# Patient Record
Sex: MALE | Race: WHITE | URBAN - NONMETROPOLITAN AREA
[De-identification: names, ages, dates, MRNs, and addresses within clinical notes are randomized per-mention and may not be internally consistent; named-entity substitution may affect disease eponyms.]

---

## 2017-07-09 ENCOUNTER — HOSPITAL ENCOUNTER (EMERGENCY)
Age: 22
Discharge: HOME | End: 2017-07-09
Payer: MEDICARE

## 2017-07-09 VITALS
TEMPERATURE: 98.5 F | HEART RATE: 97 BPM | SYSTOLIC BLOOD PRESSURE: 131 MMHG | HEIGHT: 62 IN | OXYGEN SATURATION: 96 % | DIASTOLIC BLOOD PRESSURE: 90 MMHG | RESPIRATION RATE: 18 BRPM | BODY MASS INDEX: 40.67 KG/M2 | WEIGHT: 221 LBS

## 2017-07-09 DIAGNOSIS — L30.9 DERMATITIS: Primary | ICD-10-CM

## 2017-07-09 PROCEDURE — 99282 EMERGENCY DEPT VISIT SF MDM: CPT

## 2017-07-09 PROCEDURE — 6370000000 HC RX 637 (ALT 250 FOR IP): Performed by: PHYSICIAN ASSISTANT

## 2017-07-09 RX ORDER — PREDNISONE 50 MG/1
50 TABLET ORAL DAILY
Qty: 3 TABLET | Refills: 0 | Status: SHIPPED | OUTPATIENT
Start: 2017-07-09

## 2017-07-09 RX ORDER — PREDNISONE 20 MG/1
60 TABLET ORAL ONCE
Status: COMPLETED | OUTPATIENT
Start: 2017-07-09 | End: 2017-07-09

## 2017-07-09 RX ORDER — DIPHENHYDRAMINE HCL 25 MG
25 TABLET ORAL ONCE
Status: COMPLETED | OUTPATIENT
Start: 2017-07-09 | End: 2017-07-09

## 2017-07-09 RX ADMIN — PREDNISONE 60 MG: 20 TABLET ORAL at 19:56

## 2017-07-09 RX ADMIN — DIPHENHYDRAMINE HYDROCHLORIDE 25 MG: 25 CAPSULE ORAL at 19:56

## 2017-07-09 ASSESSMENT — ENCOUNTER SYMPTOMS
DIARRHEA: 0
NAUSEA: 0
COUGH: 0
EYE DISCHARGE: 0
BACK PAIN: 0
SINUS PRESSURE: 0
EYE PAIN: 0
WHEEZING: 0
ABDOMINAL PAIN: 0
RHINORRHEA: 0
VOMITING: 0
TROUBLE SWALLOWING: 0

## 2017-07-09 ASSESSMENT — PAIN SCALES - GENERAL: PAINLEVEL_OUTOF10: 0

## 2019-04-25 ENCOUNTER — HOSPITAL ENCOUNTER (OUTPATIENT)
Age: 24
Discharge: HOME | End: 2019-04-25
Payer: MEDICARE

## 2019-04-25 LAB
ABSOLUTE EOS #: 0.17 K/UL (ref 0–0.44)
ABSOLUTE IMMATURE GRANULOCYTE: 0.12 K/UL (ref 0–0.3)
ABSOLUTE LYMPH #: 3.92 K/UL (ref 1.1–3.7)
ABSOLUTE MONO #: 0.92 K/UL (ref 0.1–1.2)
ALBUMIN SERPL-MCNC: 4 G/DL (ref 3.5–5.2)
ALBUMIN/GLOBULIN RATIO: 1 (ref 1–2.5)
ALP BLD-CCNC: 101 U/L (ref 40–129)
ALT SERPL-CCNC: 117 U/L (ref 5–41)
ANION GAP SERPL CALCULATED.3IONS-SCNC: 14 MMOL/L (ref 9–17)
AST SERPL-CCNC: 42 U/L
BASOPHILS # BLD: 2 % (ref 0–2)
BASOPHILS ABSOLUTE: 0.13 K/UL (ref 0–0.2)
BILIRUB SERPL-MCNC: 0.48 MG/DL (ref 0.3–1.2)
BUN BLDV-MCNC: 15 MG/DL (ref 6–20)
BUN/CREAT BLD: 14 (ref 9–20)
CALCIUM SERPL-MCNC: 9.5 MG/DL (ref 8.6–10.4)
CHLORIDE BLD-SCNC: 99 MMOL/L (ref 98–107)
CHOLESTEROL/HDL RATIO: 5.3
CHOLESTEROL: 239 MG/DL
CO2: 28 MMOL/L (ref 20–31)
CREAT SERPL-MCNC: 1.05 MG/DL (ref 0.7–1.2)
DIFFERENTIAL TYPE: ABNORMAL
EOSINOPHILS RELATIVE PERCENT: 2 % (ref 1–4)
GFR AFRICAN AMERICAN: >60 ML/MIN
GFR NON-AFRICAN AMERICAN: >60 ML/MIN
GFR SERPL CREATININE-BSD FRML MDRD: ABNORMAL ML/MIN/{1.73_M2}
GFR SERPL CREATININE-BSD FRML MDRD: ABNORMAL ML/MIN/{1.73_M2}
GLUCOSE BLD-MCNC: 105 MG/DL (ref 70–99)
HCT VFR BLD CALC: 51.9 % (ref 40.7–50.3)
HDLC SERPL-MCNC: 45 MG/DL
HEMOGLOBIN: 16.8 G/DL (ref 13–17)
IMMATURE GRANULOCYTES: 1 %
LDL CHOLESTEROL: 159 MG/DL (ref 0–130)
LYMPHOCYTES # BLD: 45 % (ref 24–43)
MCH RBC QN AUTO: 30.2 PG (ref 25.2–33.5)
MCHC RBC AUTO-ENTMCNC: 32.4 G/DL (ref 28.4–34.8)
MCV RBC AUTO: 93.3 FL (ref 82.6–102.9)
MONOCYTES # BLD: 10 % (ref 3–12)
NRBC AUTOMATED: 0 PER 100 WBC
PDW BLD-RTO: 15.3 % (ref 11.8–14.4)
PLATELET # BLD: 328 K/UL (ref 138–453)
PLATELET ESTIMATE: ABNORMAL
PMV BLD AUTO: 9 FL (ref 8.1–13.5)
POTASSIUM SERPL-SCNC: 3.7 MMOL/L (ref 3.7–5.3)
RBC # BLD: 5.56 M/UL (ref 4.21–5.77)
RBC # BLD: ABNORMAL 10*6/UL
SEG NEUTROPHILS: 40 % (ref 36–65)
SEGMENTED NEUTROPHILS ABSOLUTE COUNT: 3.56 K/UL (ref 1.5–8.1)
SODIUM BLD-SCNC: 141 MMOL/L (ref 135–144)
TOTAL PROTEIN: 8.2 G/DL (ref 6.4–8.3)
TRIGL SERPL-MCNC: 174 MG/DL
VLDLC SERPL CALC-MCNC: ABNORMAL MG/DL (ref 1–30)
WBC # BLD: 8.8 K/UL (ref 3.5–11.3)
WBC # BLD: ABNORMAL 10*3/UL

## 2019-04-25 PROCEDURE — 80061 LIPID PANEL: CPT

## 2019-04-25 PROCEDURE — 36415 COLL VENOUS BLD VENIPUNCTURE: CPT

## 2019-04-25 PROCEDURE — 80053 COMPREHEN METABOLIC PANEL: CPT

## 2019-04-25 PROCEDURE — 85025 COMPLETE CBC W/AUTO DIFF WBC: CPT

## 2021-11-15 ENCOUNTER — HOSPITAL ENCOUNTER (OUTPATIENT)
Age: 26
Setting detail: SPECIMEN
Discharge: HOME | End: 2021-11-15

## 2021-11-16 LAB
-: NORMAL
AMORPHOUS: NORMAL
BACTERIA: NORMAL
BILIRUBIN URINE: NEGATIVE
CASTS UA: NORMAL /LPF (ref 0–8)
COLOR: YELLOW
COMMENT UA: ABNORMAL
CRYSTALS, UA: NORMAL /HPF
EPITHELIAL CELLS UA: NORMAL /HPF (ref 0–5)
GLUCOSE URINE: NEGATIVE
KETONES, URINE: NEGATIVE
LEUKOCYTE ESTERASE, URINE: NEGATIVE
MUCUS: NORMAL
NITRITE, URINE: NEGATIVE
OTHER OBSERVATIONS UA: NORMAL
PH UA: 6.5 (ref 5–8)
PROTEIN UA: NEGATIVE
RBC UA: NORMAL /HPF (ref 0–4)
RENAL EPITHELIAL, UA: NORMAL /HPF
SPECIFIC GRAVITY UA: 1.02 (ref 1–1.03)
TRICHOMONAS: NORMAL
TURBIDITY: CLEAR
URINE HGB: ABNORMAL
UROBILINOGEN, URINE: NORMAL
WBC UA: NORMAL /HPF (ref 0–5)
YEAST: NORMAL

## 2022-03-22 ENCOUNTER — HOSPITAL ENCOUNTER (OUTPATIENT)
Age: 27
Setting detail: SPECIMEN
Discharge: HOME | End: 2022-03-22

## 2022-03-22 LAB
ABSOLUTE EOS #: 0.08 K/UL (ref 0–0.44)
ABSOLUTE IMMATURE GRANULOCYTE: 0.06 K/UL (ref 0–0.3)
ABSOLUTE LYMPH #: 3.16 K/UL (ref 1.1–3.7)
ABSOLUTE MONO #: 0.73 K/UL (ref 0.1–1.2)
BASOPHILS # BLD: 2 % (ref 0–2)
BASOPHILS ABSOLUTE: 0.15 K/UL (ref 0–0.2)
CHOLESTEROL, FASTING: 227 MG/DL
CHOLESTEROL/HDL RATIO: 5.3
EOSINOPHILS RELATIVE PERCENT: 1 % (ref 1–4)
FOLATE: >20 NG/ML
HCT VFR BLD CALC: 53.2 % (ref 40.7–50.3)
HDLC SERPL-MCNC: 43 MG/DL
HEMOGLOBIN: 17.3 G/DL (ref 13–17)
IMMATURE GRANULOCYTES: 1 %
LDL CHOLESTEROL: 159 MG/DL (ref 0–130)
LYMPHOCYTES # BLD: 47 % (ref 24–43)
MCH RBC QN AUTO: 30.8 PG (ref 25.2–33.5)
MCHC RBC AUTO-ENTMCNC: 32.5 G/DL (ref 28.4–34.8)
MCV RBC AUTO: 94.8 FL (ref 82.6–102.9)
MONOCYTES # BLD: 11 % (ref 3–12)
NRBC AUTOMATED: 0 PER 100 WBC
PDW BLD-RTO: 14.7 % (ref 11.8–14.4)
PLATELET # BLD: 409 K/UL (ref 138–453)
PMV BLD AUTO: 9.1 FL (ref 8.1–13.5)
RBC # BLD: 5.61 M/UL (ref 4.21–5.77)
RBC # BLD: ABNORMAL 10*6/UL
SEG NEUTROPHILS: 38 % (ref 36–65)
SEGMENTED NEUTROPHILS ABSOLUTE COUNT: 2.53 K/UL (ref 1.5–8.1)
TRIGLYCERIDE, FASTING: 126 MG/DL
TSH SERPL DL<=0.05 MIU/L-ACNC: 2.45 MIU/L (ref 0.3–5)
VITAMIN B-12: 677 PG/ML (ref 232–1245)
VITAMIN D 25-HYDROXY: 29.3 NG/ML
WBC # BLD: 6.7 K/UL (ref 3.5–11.3)

## 2022-03-29 LAB
ALBUMIN SERPL-MCNC: 4.2 G/DL (ref 3.5–5.2)
ALBUMIN/GLOBULIN RATIO: 1.3 (ref 1–2.5)
ALP BLD-CCNC: 95 U/L (ref 40–129)
ALT SERPL-CCNC: 37 U/L (ref 5–41)
ANION GAP SERPL CALCULATED.3IONS-SCNC: 19 MMOL/L (ref 9–17)
AST SERPL-CCNC: 25 U/L
BILIRUB SERPL-MCNC: 0.7 MG/DL (ref 0.3–1.2)
BUN BLDV-MCNC: 12 MG/DL (ref 6–20)
CALCIUM SERPL-MCNC: 9.4 MG/DL (ref 8.6–10.4)
CHLORIDE BLD-SCNC: 101 MMOL/L (ref 98–107)
CO2: 19 MMOL/L (ref 20–31)
CREAT SERPL-MCNC: 0.94 MG/DL (ref 0.7–1.2)
GFR AFRICAN AMERICAN: >60 ML/MIN
GFR NON-AFRICAN AMERICAN: >60 ML/MIN
GFR SERPL CREATININE-BSD FRML MDRD: ABNORMAL ML/MIN/{1.73_M2}
GLUCOSE BLD-MCNC: 86 MG/DL (ref 70–99)
POTASSIUM SERPL-SCNC: 4.3 MMOL/L (ref 3.7–5.3)
SODIUM BLD-SCNC: 139 MMOL/L (ref 135–144)
TOTAL PROTEIN: 7.5 G/DL (ref 6.4–8.3)

## 2023-01-01 ENCOUNTER — APPOINTMENT (OUTPATIENT)
Dept: CT IMAGING | Age: 28
DRG: 871 | End: 2023-01-01
Payer: MEDICARE

## 2023-01-01 ENCOUNTER — APPOINTMENT (OUTPATIENT)
Dept: GENERAL RADIOLOGY | Age: 28
DRG: 871 | End: 2023-01-01
Payer: MEDICARE

## 2023-01-01 ENCOUNTER — ANESTHESIA (OUTPATIENT)
Dept: ICU | Age: 28
End: 2023-01-01

## 2023-01-01 ENCOUNTER — ANESTHESIA EVENT (OUTPATIENT)
Dept: ICU | Age: 28
End: 2023-01-01

## 2023-01-01 ENCOUNTER — HOSPITAL ENCOUNTER (INPATIENT)
Age: 28
LOS: 5 days | DRG: 871 | End: 2023-02-16
Attending: EMERGENCY MEDICINE | Admitting: INTERNAL MEDICINE
Payer: MEDICARE

## 2023-01-01 ENCOUNTER — ANESTHESIA (OUTPATIENT)
Dept: OPERATING ROOM | Age: 28
DRG: 871 | End: 2023-01-01
Payer: MEDICARE

## 2023-01-01 ENCOUNTER — ANESTHESIA EVENT (OUTPATIENT)
Dept: OPERATING ROOM | Age: 28
DRG: 871 | End: 2023-01-01
Payer: MEDICARE

## 2023-01-01 VITALS
BODY MASS INDEX: 34.6 KG/M2 | HEIGHT: 67 IN | HEART RATE: 96 BPM | OXYGEN SATURATION: 99 % | TEMPERATURE: 97.5 F | DIASTOLIC BLOOD PRESSURE: 77 MMHG | RESPIRATION RATE: 14 BRPM | WEIGHT: 220.46 LBS | SYSTOLIC BLOOD PRESSURE: 119 MMHG

## 2023-01-01 DIAGNOSIS — I46.9 CARDIAC ARREST (HCC): ICD-10-CM

## 2023-01-01 DIAGNOSIS — T78.2XXA ANAPHYLACTIC SHOCK: Primary | ICD-10-CM

## 2023-01-01 LAB
ABSOLUTE EOS #: 0 K/UL (ref 0–0.4)
ABSOLUTE EOS #: 0.39 K/UL (ref 0–0.4)
ABSOLUTE IMMATURE GRANULOCYTE: 0.43 K/UL (ref 0–0.3)
ABSOLUTE IMMATURE GRANULOCYTE: 1.06 K/UL (ref 0–0.3)
ABSOLUTE IMMATURE GRANULOCYTE: 1.27 K/UL (ref 0–0.3)
ABSOLUTE IMMATURE GRANULOCYTE: 1.88 K/UL (ref 0–0.3)
ABSOLUTE IMMATURE GRANULOCYTE: 2.82 K/UL (ref 0–0.3)
ABSOLUTE IMMATURE GRANULOCYTE: 3.94 K/UL (ref 0–0.3)
ABSOLUTE LYMPH #: 0.56 K/UL (ref 1–4.8)
ABSOLUTE LYMPH #: 1.06 K/UL (ref 1–4.8)
ABSOLUTE LYMPH #: 1.5 K/UL (ref 1–4.8)
ABSOLUTE LYMPH #: 2.13 K/UL (ref 1–4.8)
ABSOLUTE LYMPH #: 2.23 K/UL (ref 1–4.8)
ABSOLUTE LYMPH #: 4.73 K/UL (ref 1–4.8)
ABSOLUTE MONO #: 0.38 K/UL (ref 0.1–0.8)
ABSOLUTE MONO #: 0.39 K/UL (ref 0.1–0.8)
ABSOLUTE MONO #: 0.56 K/UL (ref 0.1–0.8)
ABSOLUTE MONO #: 1.27 K/UL (ref 0.1–0.8)
ABSOLUTE MONO #: 1.28 K/UL (ref 0.1–0.8)
ABSOLUTE MONO #: 2.12 K/UL (ref 0.1–0.8)
ADENOVIRUS PCR: NOT DETECTED
ALBUMIN SERPL-MCNC: 2.1 G/DL (ref 3.5–5.2)
ALBUMIN SERPL-MCNC: 2.1 G/DL (ref 3.5–5.2)
ALBUMIN SERPL-MCNC: 2.2 G/DL (ref 3.5–5.2)
ALBUMIN SERPL-MCNC: 2.3 G/DL (ref 3.5–5.2)
ALBUMIN SERPL-MCNC: 2.4 G/DL (ref 3.5–5.2)
ALBUMIN SERPL-MCNC: 2.5 G/DL (ref 3.5–5.2)
ALBUMIN SERPL-MCNC: 2.6 G/DL (ref 3.5–5.2)
ALBUMIN SERPL-MCNC: 2.6 G/DL (ref 3.5–5.2)
ALBUMIN SERPL-MCNC: 2.7 G/DL (ref 3.5–5.2)
ALBUMIN SERPL-MCNC: 2.8 G/DL (ref 3.5–5.2)
ALBUMIN/GLOBULIN RATIO: 0.8 (ref 1–2.5)
ALBUMIN/GLOBULIN RATIO: 0.9 (ref 1–2.5)
ALBUMIN/GLOBULIN RATIO: 0.9 (ref 1–2.5)
ALBUMIN/GLOBULIN RATIO: 1 (ref 1–2.5)
ALBUMIN/GLOBULIN RATIO: 1.2 (ref 1–2.5)
ALBUMIN/GLOBULIN RATIO: 1.2 (ref 1–2.5)
ALBUMIN/GLOBULIN RATIO: 1.3 (ref 1–2.5)
ALLEN TEST: ABNORMAL
ALLEN TEST: NORMAL
ALLEN TEST: POSITIVE
ALP SERPL-CCNC: 187 U/L (ref 40–129)
ALP SERPL-CCNC: 203 U/L (ref 40–129)
ALP SERPL-CCNC: 218 U/L (ref 40–129)
ALP SERPL-CCNC: 224 U/L (ref 40–129)
ALP SERPL-CCNC: 243 U/L (ref 40–129)
ALP SERPL-CCNC: 260 U/L (ref 40–129)
ALP SERPL-CCNC: 261 U/L (ref 40–129)
ALP SERPL-CCNC: 287 U/L (ref 40–129)
ALP SERPL-CCNC: 293 U/L (ref 40–129)
ALP SERPL-CCNC: 327 U/L (ref 40–129)
ALP SERPL-CCNC: 348 U/L (ref 40–129)
ALP SERPL-CCNC: 428 U/L (ref 40–129)
ALP SERPL-CCNC: 431 U/L (ref 40–129)
ALP SERPL-CCNC: 441 U/L (ref 40–129)
ALT SERPL-CCNC: 1452 U/L (ref 5–41)
ALT SERPL-CCNC: 1667 U/L (ref 5–41)
ALT SERPL-CCNC: 1712 U/L (ref 5–41)
ALT SERPL-CCNC: 2364 U/L (ref 5–41)
ALT SERPL-CCNC: 2392 U/L (ref 5–41)
ALT SERPL-CCNC: 2796 U/L (ref 5–41)
ALT SERPL-CCNC: 3335 U/L (ref 5–41)
ALT SERPL-CCNC: 3965 U/L (ref 5–41)
ALT SERPL-CCNC: 4371 U/L (ref 5–41)
ALT SERPL-CCNC: 5043 U/L (ref 5–41)
ALT SERPL-CCNC: 5371 U/L (ref 5–41)
ALT SERPL-CCNC: 5585 U/L (ref 5–41)
ALT SERPL-CCNC: 6917 U/L (ref 5–41)
ALT SERPL-CCNC: >7000 U/L (ref 5–41)
AMMONIA PLAS-SCNC: 51 UMOL/L (ref 16–60)
AMYLASE SERPL-CCNC: 46 U/L (ref 28–100)
ANION GAP SERPL CALCULATED.3IONS-SCNC: 14 MMOL/L (ref 9–17)
ANION GAP SERPL CALCULATED.3IONS-SCNC: 15 MMOL/L (ref 9–17)
ANION GAP SERPL CALCULATED.3IONS-SCNC: 17 MMOL/L (ref 9–17)
ANION GAP SERPL CALCULATED.3IONS-SCNC: 18 MMOL/L (ref 9–17)
ANION GAP SERPL CALCULATED.3IONS-SCNC: 19 MMOL/L (ref 9–17)
ANION GAP SERPL CALCULATED.3IONS-SCNC: 19 MMOL/L (ref 9–17)
ANION GAP SERPL CALCULATED.3IONS-SCNC: 20 MMOL/L (ref 9–17)
ANION GAP SERPL CALCULATED.3IONS-SCNC: 21 MMOL/L (ref 9–17)
ANION GAP SERPL CALCULATED.3IONS-SCNC: 22 MMOL/L (ref 9–17)
ANION GAP SERPL CALCULATED.3IONS-SCNC: 22 MMOL/L (ref 9–17)
AST SERPL-CCNC: 1009 U/L
AST SERPL-CCNC: 1433 U/L
AST SERPL-CCNC: 164 U/L
AST SERPL-CCNC: 203 U/L
AST SERPL-CCNC: 2060 U/L
AST SERPL-CCNC: 2578 U/L
AST SERPL-CCNC: 296 U/L
AST SERPL-CCNC: 3114 U/L
AST SERPL-CCNC: 319 U/L
AST SERPL-CCNC: 443 U/L
AST SERPL-CCNC: 4896 U/L
AST SERPL-CCNC: 631 U/L
AST SERPL-CCNC: >7000 U/L
AST SERPL-CCNC: >7000 U/L
B PARAP IS1001 DNA NPH QL NAA+NON-PROBE: NOT DETECTED
B PERT DNA SPEC QL NAA+PROBE: NOT DETECTED
BASOPHILS # BLD: 0 % (ref 0–2)
BASOPHILS ABSOLUTE: 0 K/UL (ref 0–0.2)
BILIRUB DIRECT SERPL-MCNC: 2.3 MG/DL
BILIRUB DIRECT SERPL-MCNC: 2.4 MG/DL
BILIRUB DIRECT SERPL-MCNC: 2.6 MG/DL
BILIRUB DIRECT SERPL-MCNC: 2.7 MG/DL
BILIRUB DIRECT SERPL-MCNC: 2.7 MG/DL
BILIRUB DIRECT SERPL-MCNC: 2.8 MG/DL
BILIRUB DIRECT SERPL-MCNC: 2.9 MG/DL
BILIRUB DIRECT SERPL-MCNC: 3 MG/DL
BILIRUB INDIRECT SERPL-MCNC: 0.4 MG/DL (ref 0–1)
BILIRUB SERPL-MCNC: 2 MG/DL (ref 0.3–1.2)
BILIRUB SERPL-MCNC: 2.7 MG/DL (ref 0.3–1.2)
BILIRUB SERPL-MCNC: 2.9 MG/DL (ref 0.3–1.2)
BILIRUB SERPL-MCNC: 3 MG/DL (ref 0.3–1.2)
BILIRUB SERPL-MCNC: 3.1 MG/DL (ref 0.3–1.2)
BILIRUB SERPL-MCNC: 3.2 MG/DL (ref 0.3–1.2)
BILIRUB SERPL-MCNC: 3.4 MG/DL (ref 0.3–1.2)
BILIRUB SERPL-MCNC: 3.5 MG/DL (ref 0.3–1.2)
BILIRUB SERPL-MCNC: 3.6 MG/DL (ref 0.3–1.2)
BILIRUB SERPL-MCNC: 3.7 MG/DL (ref 0.3–1.2)
BILIRUBIN URINE: ABNORMAL
BILIRUBIN URINE: NEGATIVE
BNP SERPL-MCNC: 921 PG/ML
BUN SERPL-MCNC: 24 MG/DL (ref 6–20)
BUN SERPL-MCNC: 29 MG/DL (ref 6–20)
BUN SERPL-MCNC: 39 MG/DL (ref 6–20)
BUN SERPL-MCNC: 45 MG/DL (ref 6–20)
BUN SERPL-MCNC: 56 MG/DL (ref 6–20)
BUN SERPL-MCNC: 56 MG/DL (ref 6–20)
BUN SERPL-MCNC: 68 MG/DL (ref 6–20)
BUN SERPL-MCNC: 72 MG/DL (ref 6–20)
BUN SERPL-MCNC: 74 MG/DL (ref 6–20)
BUN SERPL-MCNC: 76 MG/DL (ref 6–20)
BUN SERPL-MCNC: 81 MG/DL (ref 6–20)
BUN SERPL-MCNC: 81 MG/DL (ref 6–20)
BUN SERPL-MCNC: 85 MG/DL (ref 6–20)
BUN SERPL-MCNC: 87 MG/DL (ref 6–20)
BUN SERPL-MCNC: 90 MG/DL (ref 6–20)
BUN SERPL-MCNC: 91 MG/DL (ref 6–20)
CA-I BLD-SCNC: 0.77 MMOL/L (ref 1.13–1.33)
CA-I BLD-SCNC: 0.84 MMOL/L (ref 1.13–1.33)
CA-I BLD-SCNC: 1 MMOL/L (ref 1.13–1.33)
CALCIUM SERPL-MCNC: 5.6 MG/DL (ref 8.6–10.4)
CALCIUM SERPL-MCNC: 5.6 MG/DL (ref 8.6–10.4)
CALCIUM SERPL-MCNC: 6 MG/DL (ref 8.6–10.4)
CALCIUM SERPL-MCNC: 6.1 MG/DL (ref 8.6–10.4)
CALCIUM SERPL-MCNC: 6.2 MG/DL (ref 8.6–10.4)
CALCIUM SERPL-MCNC: 6.4 MG/DL (ref 8.6–10.4)
CALCIUM SERPL-MCNC: 6.5 MG/DL (ref 8.6–10.4)
CALCIUM SERPL-MCNC: 6.9 MG/DL (ref 8.6–10.4)
CALCIUM SERPL-MCNC: 6.9 MG/DL (ref 8.6–10.4)
CALCIUM SERPL-MCNC: 7 MG/DL (ref 8.6–10.4)
CALCIUM SERPL-MCNC: 7 MG/DL (ref 8.6–10.4)
CALCIUM SERPL-MCNC: 7.3 MG/DL (ref 8.6–10.4)
CARBOXYHEMOGLOBIN: 1.8 % (ref 0–5)
CASTS UA: ABNORMAL /LPF (ref 0–8)
CHLAMYDIA PNEUMONIAE BY PCR: NOT DETECTED
CHLORIDE SERPL-SCNC: 100 MMOL/L (ref 98–107)
CHLORIDE SERPL-SCNC: 101 MMOL/L (ref 98–107)
CHLORIDE SERPL-SCNC: 101 MMOL/L (ref 98–107)
CHLORIDE SERPL-SCNC: 102 MMOL/L (ref 98–107)
CHLORIDE SERPL-SCNC: 102 MMOL/L (ref 98–107)
CHLORIDE SERPL-SCNC: 95 MMOL/L (ref 98–107)
CHLORIDE SERPL-SCNC: 96 MMOL/L (ref 98–107)
CHLORIDE SERPL-SCNC: 97 MMOL/L (ref 98–107)
CHLORIDE SERPL-SCNC: 98 MMOL/L (ref 98–107)
CHLORIDE SERPL-SCNC: 99 MMOL/L (ref 98–107)
CK SERPL-CCNC: ABNORMAL U/L (ref 39–308)
CO2 SERPL-SCNC: 16 MMOL/L (ref 20–31)
CO2 SERPL-SCNC: 17 MMOL/L (ref 20–31)
CO2 SERPL-SCNC: 18 MMOL/L (ref 20–31)
CO2 SERPL-SCNC: 19 MMOL/L (ref 20–31)
CO2 SERPL-SCNC: 23 MMOL/L (ref 20–31)
CO2 SERPL-SCNC: 23 MMOL/L (ref 20–31)
CO2 SERPL-SCNC: 26 MMOL/L (ref 20–31)
CO2 SERPL-SCNC: 27 MMOL/L (ref 20–31)
CO2 SERPL-SCNC: 28 MMOL/L (ref 20–31)
CO2 SERPL-SCNC: 28 MMOL/L (ref 20–31)
CO2 SERPL-SCNC: 29 MMOL/L (ref 20–31)
CO2 SERPL-SCNC: 29 MMOL/L (ref 20–31)
CO2 SERPL-SCNC: 30 MMOL/L (ref 20–31)
COLOR: ABNORMAL
COLOR: YELLOW
CORONAVIRUS 229E PCR: NOT DETECTED
CORONAVIRUS HKU1 PCR: NOT DETECTED
CORONAVIRUS NL63 PCR: NOT DETECTED
CORONAVIRUS OC43 PCR: NOT DETECTED
CREAT SERPL-MCNC: 1.92 MG/DL (ref 0.7–1.2)
CREAT SERPL-MCNC: 2.03 MG/DL (ref 0.7–1.2)
CREAT SERPL-MCNC: 2.64 MG/DL (ref 0.7–1.2)
CREAT SERPL-MCNC: 2.8 MG/DL (ref 0.7–1.2)
CREAT SERPL-MCNC: 3.42 MG/DL (ref 0.7–1.2)
CREAT SERPL-MCNC: 4.15 MG/DL (ref 0.7–1.2)
CREAT SERPL-MCNC: 4.36 MG/DL (ref 0.7–1.2)
CREAT SERPL-MCNC: 4.79 MG/DL (ref 0.7–1.2)
CREAT SERPL-MCNC: 4.89 MG/DL (ref 0.7–1.2)
CREAT SERPL-MCNC: 5.22 MG/DL (ref 0.7–1.2)
CREAT SERPL-MCNC: 5.26 MG/DL (ref 0.7–1.2)
CREAT SERPL-MCNC: 5.31 MG/DL (ref 0.7–1.2)
CREAT SERPL-MCNC: 5.4 MG/DL (ref 0.7–1.2)
CREAT SERPL-MCNC: 5.96 MG/DL (ref 0.7–1.2)
CREAT SERPL-MCNC: 6.01 MG/DL (ref 0.7–1.2)
CREAT SERPL-MCNC: 6.15 MG/DL (ref 0.7–1.2)
EGFR, POC: 42 ML/MIN/1.73M2
EKG ATRIAL RATE: 119 BPM
EKG ATRIAL RATE: 97 BPM
EKG P AXIS: 20 DEGREES
EKG P AXIS: 60 DEGREES
EKG P-R INTERVAL: 156 MS
EKG P-R INTERVAL: 158 MS
EKG Q-T INTERVAL: 320 MS
EKG Q-T INTERVAL: 368 MS
EKG QRS DURATION: 78 MS
EKG QRS DURATION: 82 MS
EKG QTC CALCULATION (BAZETT): 450 MS
EKG QTC CALCULATION (BAZETT): 467 MS
EKG R AXIS: 14 DEGREES
EKG R AXIS: 67 DEGREES
EKG T AXIS: 11 DEGREES
EKG T AXIS: 39 DEGREES
EKG VENTRICULAR RATE: 119 BPM
EKG VENTRICULAR RATE: 97 BPM
EOSINOPHILS RELATIVE PERCENT: 0 % (ref 1–4)
EOSINOPHILS RELATIVE PERCENT: 1 % (ref 1–4)
EPITHELIAL CELLS UA: ABNORMAL /HPF (ref 0–5)
EPITHELIAL CELLS UA: NORMAL /HPF (ref 0–5)
FIO2: 100
FIO2: 30
FIO2: 40
FIO2: ABNORMAL
GFR SERPL CREATININE-BSD FRML MDRD: 12 ML/MIN/1.73M2
GFR SERPL CREATININE-BSD FRML MDRD: 14 ML/MIN/1.73M2
GFR SERPL CREATININE-BSD FRML MDRD: 16 ML/MIN/1.73M2
GFR SERPL CREATININE-BSD FRML MDRD: 16 ML/MIN/1.73M2
GFR SERPL CREATININE-BSD FRML MDRD: 18 ML/MIN/1.73M2
GFR SERPL CREATININE-BSD FRML MDRD: 19 ML/MIN/1.73M2
GFR SERPL CREATININE-BSD FRML MDRD: 24 ML/MIN/1.73M2
GFR SERPL CREATININE-BSD FRML MDRD: 30 ML/MIN/1.73M2
GFR SERPL CREATININE-BSD FRML MDRD: 33 ML/MIN/1.73M2
GFR SERPL CREATININE-BSD FRML MDRD: 45 ML/MIN/1.73M2
GFR SERPL CREATININE-BSD FRML MDRD: 48 ML/MIN/1.73M2
GGT: 244 U/L (ref 8–61)
GLUCOSE BLD-MCNC: 102 MG/DL (ref 75–110)
GLUCOSE BLD-MCNC: 106 MG/DL (ref 75–110)
GLUCOSE BLD-MCNC: 110 MG/DL (ref 74–100)
GLUCOSE BLD-MCNC: 116 MG/DL (ref 74–100)
GLUCOSE BLD-MCNC: 117 MG/DL (ref 74–100)
GLUCOSE BLD-MCNC: 120 MG/DL (ref 74–100)
GLUCOSE BLD-MCNC: 121 MG/DL (ref 74–100)
GLUCOSE BLD-MCNC: 122 MG/DL (ref 74–100)
GLUCOSE BLD-MCNC: 122 MG/DL (ref 74–100)
GLUCOSE BLD-MCNC: 124 MG/DL (ref 74–100)
GLUCOSE BLD-MCNC: 125 MG/DL (ref 75–110)
GLUCOSE BLD-MCNC: 126 MG/DL (ref 74–100)
GLUCOSE BLD-MCNC: 129 MG/DL (ref 74–100)
GLUCOSE BLD-MCNC: 130 MG/DL (ref 75–110)
GLUCOSE BLD-MCNC: 132 MG/DL (ref 74–100)
GLUCOSE BLD-MCNC: 132 MG/DL (ref 75–110)
GLUCOSE BLD-MCNC: 141 MG/DL (ref 74–100)
GLUCOSE BLD-MCNC: 147 MG/DL (ref 75–110)
GLUCOSE BLD-MCNC: 159 MG/DL (ref 74–100)
GLUCOSE BLD-MCNC: 161 MG/DL (ref 74–100)
GLUCOSE BLD-MCNC: 162 MG/DL (ref 74–100)
GLUCOSE BLD-MCNC: 163 MG/DL (ref 74–100)
GLUCOSE BLD-MCNC: 165 MG/DL (ref 74–100)
GLUCOSE BLD-MCNC: 165 MG/DL (ref 75–110)
GLUCOSE BLD-MCNC: 189 MG/DL (ref 74–100)
GLUCOSE BLD-MCNC: 82 MG/DL (ref 75–110)
GLUCOSE BLD-MCNC: 98 MG/DL (ref 75–110)
GLUCOSE SERPL-MCNC: 106 MG/DL (ref 70–99)
GLUCOSE SERPL-MCNC: 107 MG/DL (ref 70–99)
GLUCOSE SERPL-MCNC: 125 MG/DL (ref 70–99)
GLUCOSE SERPL-MCNC: 132 MG/DL (ref 70–99)
GLUCOSE SERPL-MCNC: 132 MG/DL (ref 70–99)
GLUCOSE SERPL-MCNC: 140 MG/DL (ref 70–99)
GLUCOSE SERPL-MCNC: 149 MG/DL (ref 70–99)
GLUCOSE SERPL-MCNC: 150 MG/DL (ref 70–99)
GLUCOSE SERPL-MCNC: 155 MG/DL (ref 70–99)
GLUCOSE SERPL-MCNC: 179 MG/DL (ref 70–99)
GLUCOSE SERPL-MCNC: 181 MG/DL (ref 70–99)
GLUCOSE SERPL-MCNC: 190 MG/DL (ref 70–99)
GLUCOSE SERPL-MCNC: 196 MG/DL (ref 70–99)
GLUCOSE SERPL-MCNC: 202 MG/DL (ref 70–99)
GLUCOSE SERPL-MCNC: 202 MG/DL (ref 70–99)
GLUCOSE SERPL-MCNC: 208 MG/DL (ref 70–99)
GLUCOSE UR STRIP.AUTO-MCNC: ABNORMAL MG/DL
HBV CORE AB SER QL: NONREACTIVE
HBV SURFACE AB SERPL IA-ACNC: <3.5 MIU/ML
HBV SURFACE AG SER QL: NONREACTIVE
HCO3 VENOUS: 17.6 MMOL/L (ref 24–30)
HCT VFR BLD AUTO: 27.6 % (ref 40.7–50.3)
HCT VFR BLD AUTO: 33.9 % (ref 40.7–50.3)
HCT VFR BLD AUTO: 35.9 % (ref 40.7–50.3)
HCT VFR BLD AUTO: 35.9 % (ref 40.7–50.3)
HCT VFR BLD AUTO: 36 % (ref 40.7–50.3)
HCT VFR BLD AUTO: 39.6 % (ref 40.7–50.3)
HCT VFR BLD AUTO: 40 % (ref 40.7–50.3)
HCT VFR BLD AUTO: 40.9 % (ref 40.7–50.3)
HCT VFR BLD AUTO: 41.6 % (ref 40.7–50.3)
HCT VFR BLD AUTO: 42.4 % (ref 40.7–50.3)
HCV AB SER QL: NONREACTIVE
HGB BLD-MCNC: 11.9 G/DL (ref 13–17)
HGB BLD-MCNC: 12.4 G/DL (ref 13–17)
HGB BLD-MCNC: 12.5 G/DL (ref 13–17)
HGB BLD-MCNC: 12.5 G/DL (ref 13–17)
HGB BLD-MCNC: 13.9 G/DL (ref 13–17)
HGB BLD-MCNC: 14.2 G/DL (ref 13–17)
HGB BLD-MCNC: 14.3 G/DL (ref 13–17)
HGB BLD-MCNC: 9.7 G/DL (ref 13–17)
HUMAN METAPNEUMOVIRUS PCR: NOT DETECTED
IMMATURE GRANULOCYTES: 1 %
IMMATURE GRANULOCYTES: 10 %
IMMATURE GRANULOCYTES: 10 %
IMMATURE GRANULOCYTES: 3 %
IMMATURE GRANULOCYTES: 4 %
IMMATURE GRANULOCYTES: 5 %
INFLUENZA A BY PCR: NOT DETECTED
INFLUENZA B BY PCR: NOT DETECTED
INR PPP: 1.5
INR PPP: 1.6
INR PPP: 1.9
INR PPP: 2
INR PPP: 2
INR PPP: 2.1
INR PPP: 2.2
KETONES UR STRIP.AUTO-MCNC: NEGATIVE MG/DL
L PNEUMO1 AG UR QL IA.RAPID: NEGATIVE
LACTIC ACID, SEPSIS WHOLE BLOOD: 4 MMOL/L (ref 0.5–1.9)
LACTIC ACID, SEPSIS WHOLE BLOOD: 4.4 MMOL/L (ref 0.5–1.9)
LACTIC ACID, WHOLE BLOOD: 2.6 MMOL/L (ref 0.7–2.1)
LACTIC ACID, WHOLE BLOOD: 2.6 MMOL/L (ref 0.7–2.1)
LACTIC ACID, WHOLE BLOOD: 2.8 MMOL/L (ref 0.7–2.1)
LACTIC ACID, WHOLE BLOOD: 3.2 MMOL/L (ref 0.7–2.1)
LACTIC ACID, WHOLE BLOOD: 3.5 MMOL/L (ref 0.7–2.1)
LACTIC ACID, WHOLE BLOOD: 3.7 MMOL/L (ref 0.7–2.1)
LACTIC ACID, WHOLE BLOOD: 4.5 MMOL/L (ref 0.7–2.1)
LACTIC ACID, WHOLE BLOOD: 5.5 MMOL/L (ref 0.7–2.1)
LACTIC ACID, WHOLE BLOOD: 9 MMOL/L (ref 0.7–2.1)
LDLC SERPL-MCNC: 5790 U/L (ref 135–225)
LEUKOCYTE ESTERASE UR QL STRIP.AUTO: ABNORMAL
LEUKOCYTE ESTERASE UR QL STRIP.AUTO: ABNORMAL
LEUKOCYTE ESTERASE UR QL STRIP.AUTO: NEGATIVE
LEUKOCYTE ESTERASE UR QL STRIP.AUTO: NEGATIVE
LIPASE SERPL-CCNC: 77 U/L (ref 13–60)
LV EF: 60 %
LVEF MODALITY: NORMAL
LYMPHOCYTES # BLD: 12 % (ref 24–44)
LYMPHOCYTES # BLD: 2 % (ref 24–44)
LYMPHOCYTES # BLD: 3 % (ref 24–44)
LYMPHOCYTES # BLD: 4 % (ref 24–44)
LYMPHOCYTES # BLD: 5 % (ref 24–44)
LYMPHOCYTES # BLD: 7 % (ref 24–44)
M PNEUMO IGM SER QL IA: 0.22
MAGNESIUM SERPL-MCNC: 1.7 MG/DL (ref 1.6–2.6)
MAGNESIUM SERPL-MCNC: 1.8 MG/DL (ref 1.6–2.6)
MAGNESIUM SERPL-MCNC: 1.8 MG/DL (ref 1.6–2.6)
MAGNESIUM SERPL-MCNC: 2 MG/DL (ref 1.6–2.6)
MAGNESIUM SERPL-MCNC: 2.1 MG/DL (ref 1.6–2.6)
MAGNESIUM SERPL-MCNC: 2.2 MG/DL (ref 1.6–2.6)
MAGNESIUM SERPL-MCNC: 2.3 MG/DL (ref 1.6–2.6)
MAGNESIUM SERPL-MCNC: 2.3 MG/DL (ref 1.6–2.6)
MCH RBC QN AUTO: 30.4 PG (ref 25.2–33.5)
MCH RBC QN AUTO: 30.5 PG (ref 25.2–33.5)
MCH RBC QN AUTO: 30.6 PG (ref 25.2–33.5)
MCH RBC QN AUTO: 30.6 PG (ref 25.2–33.5)
MCH RBC QN AUTO: 30.7 PG (ref 25.2–33.5)
MCH RBC QN AUTO: 30.7 PG (ref 25.2–33.5)
MCH RBC QN AUTO: 30.8 PG (ref 25.2–33.5)
MCH RBC QN AUTO: 30.9 PG (ref 25.2–33.5)
MCH RBC QN AUTO: 31 PG (ref 25.2–33.5)
MCH RBC QN AUTO: 31 PG (ref 25.2–33.5)
MCHC RBC AUTO-ENTMCNC: 32.8 G/DL (ref 28.4–34.8)
MCHC RBC AUTO-ENTMCNC: 34.4 G/DL (ref 28.4–34.8)
MCHC RBC AUTO-ENTMCNC: 34.5 G/DL (ref 28.4–34.8)
MCHC RBC AUTO-ENTMCNC: 34.7 G/DL (ref 28.4–34.8)
MCHC RBC AUTO-ENTMCNC: 34.7 G/DL (ref 28.4–34.8)
MCHC RBC AUTO-ENTMCNC: 34.8 G/DL (ref 28.4–34.8)
MCHC RBC AUTO-ENTMCNC: 34.8 G/DL (ref 28.4–34.8)
MCHC RBC AUTO-ENTMCNC: 35.1 G/DL (ref 28.4–34.8)
MCV RBC AUTO: 87.6 FL (ref 82.6–102.9)
MCV RBC AUTO: 87.9 FL (ref 82.6–102.9)
MCV RBC AUTO: 87.9 FL (ref 82.6–102.9)
MCV RBC AUTO: 88 FL (ref 82.6–102.9)
MCV RBC AUTO: 88.4 FL (ref 82.6–102.9)
MCV RBC AUTO: 88.5 FL (ref 82.6–102.9)
MCV RBC AUTO: 89.7 FL (ref 82.6–102.9)
MCV RBC AUTO: 94.6 FL (ref 82.6–102.9)
MICROORGANISM SPEC CULT: ABNORMAL
MICROORGANISM SPEC CULT: NO GROWTH
MICROORGANISM SPEC CULT: NO GROWTH
MICROORGANISM/AGENT SPEC: ABNORMAL
MODE: ABNORMAL
MODE: NORMAL
MONOCYTES # BLD: 1 % (ref 1–7)
MONOCYTES # BLD: 1 % (ref 1–7)
MONOCYTES # BLD: 2 % (ref 1–7)
MONOCYTES # BLD: 3 % (ref 1–7)
MONOCYTES # BLD: 4 % (ref 1–7)
MONOCYTES # BLD: 6 % (ref 1–7)
MORPHOLOGY: ABNORMAL
MRSA, DNA, NASAL: NEGATIVE
MYCOPLASMA PNEUMONIAE PCR: NOT DETECTED
NEGATIVE BASE EXCESS, ART: 11 (ref 0–2)
NEGATIVE BASE EXCESS, ART: 2 (ref 0–2)
NEGATIVE BASE EXCESS, ART: 4 (ref 0–2)
NEGATIVE BASE EXCESS, ART: 8 (ref 0–2)
NEGATIVE BASE EXCESS, VEN: 12.5 MMOL/L (ref 0–2)
NITRITE UR QL STRIP.AUTO: NEGATIVE
NRBC AUTOMATED: 0 PER 100 WBC
NRBC AUTOMATED: 0.1 PER 100 WBC
NRBC AUTOMATED: 0.4 PER 100 WBC
NRBC AUTOMATED: 0.8 PER 100 WBC
NRBC AUTOMATED: 0.9 PER 100 WBC
NRBC AUTOMATED: 0.9 PER 100 WBC
NUCLEATED RED BLOOD CELLS: 1 PER 100 WBC
NUCLEATED RED BLOOD CELLS: 1 PER 100 WBC
NUCLEATED RED BLOOD CELLS: 2 PER 100 WBC
O2 DEVICE/FLOW/%: ABNORMAL
O2 DEVICE/FLOW/%: NORMAL
O2 SAT, VEN: 59.9 % (ref 60–85)
PARAINFLUENZA 1 PCR: NOT DETECTED
PARAINFLUENZA 2 PCR: NOT DETECTED
PARAINFLUENZA 3 PCR: NOT DETECTED
PARAINFLUENZA 4 PCR: NOT DETECTED
PARTIAL THROMBOPLASTIN TIME: 24.9 SEC (ref 20.5–30.5)
PARTIAL THROMBOPLASTIN TIME: 25.4 SEC (ref 20.5–30.5)
PARTIAL THROMBOPLASTIN TIME: 25.5 SEC (ref 20.5–30.5)
PARTIAL THROMBOPLASTIN TIME: 25.5 SEC (ref 20.5–30.5)
PARTIAL THROMBOPLASTIN TIME: 25.8 SEC (ref 20.5–30.5)
PARTIAL THROMBOPLASTIN TIME: 25.8 SEC (ref 20.5–30.5)
PARTIAL THROMBOPLASTIN TIME: 26.7 SEC (ref 20.5–30.5)
PARTIAL THROMBOPLASTIN TIME: 27.1 SEC (ref 20.5–30.5)
PARTIAL THROMBOPLASTIN TIME: 27.3 SEC (ref 20.5–30.5)
PARTIAL THROMBOPLASTIN TIME: 28.7 SEC (ref 20.5–30.5)
PARTIAL THROMBOPLASTIN TIME: 28.7 SEC (ref 20.5–30.5)
PATIENT TEMP: 36.8
PATIENT TEMP: 36.9
PATIENT TEMP: 37
PATIENT TEMP: 37.1
PATIENT TEMP: 37.4
PATIENT TEMP: 37.8
PATIENT TEMP: 38.3
PATIENT TEMP: 39.9
PCO2, VEN: 56.7 MM HG (ref 39–55)
PDW BLD-RTO: 14.6 % (ref 11.8–14.4)
PDW BLD-RTO: 14.7 % (ref 11.8–14.4)
PDW BLD-RTO: 14.9 % (ref 11.8–14.4)
PDW BLD-RTO: 14.9 % (ref 11.8–14.4)
PDW BLD-RTO: 15 % (ref 11.8–14.4)
PDW BLD-RTO: 15.1 % (ref 11.8–14.4)
PH VENOUS: 7.12 (ref 7.32–7.42)
PHOSPHATE SERPL-MCNC: 4.7 MG/DL (ref 2.5–4.5)
PHOSPHATE SERPL-MCNC: 5.7 MG/DL (ref 2.5–4.5)
PHOSPHATE SERPL-MCNC: 6 MG/DL (ref 2.5–4.5)
PHOSPHATE SERPL-MCNC: 6.2 MG/DL (ref 2.5–4.5)
PHOSPHATE SERPL-MCNC: 6.5 MG/DL (ref 2.5–4.5)
PHOSPHATE SERPL-MCNC: 6.7 MG/DL (ref 2.5–4.5)
PHOSPHATE SERPL-MCNC: 7.3 MG/DL (ref 2.5–4.5)
PHOSPHATE SERPL-MCNC: 7.5 MG/DL (ref 2.5–4.5)
PHOSPHATE SERPL-MCNC: 7.6 MG/DL (ref 2.5–4.5)
PHOSPHATE SERPL-MCNC: 8.1 MG/DL (ref 2.5–4.5)
PHOSPHATE SERPL-MCNC: 8.1 MG/DL (ref 2.5–4.5)
PLATELET # BLD AUTO: 120 K/UL (ref 138–453)
PLATELET # BLD AUTO: 127 K/UL (ref 138–453)
PLATELET # BLD AUTO: 131 K/UL (ref 138–453)
PLATELET # BLD AUTO: 143 K/UL (ref 138–453)
PLATELET # BLD AUTO: 149 K/UL (ref 138–453)
PLATELET # BLD AUTO: 168 K/UL (ref 138–453)
PLATELET # BLD AUTO: 187 K/UL (ref 138–453)
PLATELET # BLD AUTO: 341 K/UL (ref 138–453)
PLATELET # BLD AUTO: 376 K/UL (ref 138–453)
PLATELET # BLD AUTO: ABNORMAL K/UL (ref 138–453)
PMV BLD AUTO: 10 FL (ref 8.1–13.5)
PMV BLD AUTO: 10.2 FL (ref 8.1–13.5)
PMV BLD AUTO: 10.4 FL (ref 8.1–13.5)
PMV BLD AUTO: 11.2 FL (ref 8.1–13.5)
PMV BLD AUTO: 11.5 FL (ref 8.1–13.5)
PMV BLD AUTO: 11.6 FL (ref 8.1–13.5)
PMV BLD AUTO: 11.6 FL (ref 8.1–13.5)
PMV BLD AUTO: 12.2 FL (ref 8.1–13.5)
PMV BLD AUTO: 12.4 FL (ref 8.1–13.5)
PO2, VEN: 43.8 MM HG (ref 30–50)
POC CREATININE: 2.13 MG/DL (ref 0.51–1.19)
POC HCO3: 16.9 MMOL/L (ref 21–28)
POC HCO3: 17.1 MMOL/L (ref 21–28)
POC HCO3: 19.1 MMOL/L (ref 21–28)
POC HCO3: 21.5 MMOL/L (ref 21–28)
POC HCO3: 25 MMOL/L (ref 21–28)
POC HCO3: 27.2 MMOL/L (ref 21–28)
POC HCO3: 27.4 MMOL/L (ref 21–28)
POC HCO3: 27.6 MMOL/L (ref 21–28)
POC HCO3: 28.1 MMOL/L (ref 21–28)
POC HCO3: 29.5 MMOL/L (ref 21–28)
POC HCO3: 31 MMOL/L (ref 21–28)
POC HCO3: 31.2 MMOL/L (ref 21–28)
POC HCO3: 32 MMOL/L (ref 21–28)
POC HCO3: 32.3 MMOL/L (ref 21–28)
POC HCO3: 32.3 MMOL/L (ref 21–28)
POC HCO3: 32.4 MMOL/L (ref 21–28)
POC HCO3: 32.7 MMOL/L (ref 21–28)
POC HCO3: 32.7 MMOL/L (ref 21–28)
POC HCO3: 33 MMOL/L (ref 21–28)
POC LACTIC ACID: 1.1 MMOL/L (ref 0.56–1.39)
POC LACTIC ACID: 1.85 MMOL/L (ref 0.56–1.39)
POC LACTIC ACID: 2.09 MMOL/L (ref 0.56–1.39)
POC LACTIC ACID: 2.31 MMOL/L (ref 0.56–1.39)
POC LACTIC ACID: 2.75 MMOL/L (ref 0.56–1.39)
POC LACTIC ACID: 4.36 MMOL/L (ref 0.56–1.39)
POC LACTIC ACID: 7.43 MMOL/L (ref 0.56–1.39)
POC O2 SATURATION: 100 % (ref 94–98)
POC O2 SATURATION: 97 % (ref 94–98)
POC O2 SATURATION: 98 % (ref 94–98)
POC PCO2 TEMP: 33 MM HG
POC PCO2 TEMP: 42 MM HG
POC PCO2 TEMP: 42 MM HG
POC PCO2 TEMP: 52 MM HG
POC PCO2: 29 MM HG (ref 35–48)
POC PCO2: 32 MM HG (ref 35–48)
POC PCO2: 33.8 MM HG (ref 35–48)
POC PCO2: 40 MM HG (ref 35–48)
POC PCO2: 40.9 MM HG (ref 35–48)
POC PCO2: 41.1 MM HG (ref 35–48)
POC PCO2: 41.4 MM HG (ref 35–48)
POC PCO2: 43.2 MM HG (ref 35–48)
POC PCO2: 44 MM HG (ref 35–48)
POC PCO2: 44.3 MM HG (ref 35–48)
POC PCO2: 44.9 MM HG (ref 35–48)
POC PCO2: 45.3 MM HG (ref 35–48)
POC PCO2: 46 MM HG (ref 35–48)
POC PCO2: 46 MM HG (ref 35–48)
POC PCO2: 47 MM HG (ref 35–48)
POC PCO2: 47.2 MM HG (ref 35–48)
POC PCO2: 48 MM HG (ref 35–48)
POC PCO2: 49.7 MM HG (ref 35–48)
POC PCO2: 50.5 MM HG (ref 35–48)
POC PCO2: 50.8 MM HG (ref 35–48)
POC PCO2: 51.9 MM HG (ref 35–48)
POC PH TEMP: 7.38
POC PH TEMP: 7.41
POC PH TEMP: 7.42
POC PH TEMP: 7.43
POC PH: 7.17 (ref 7.35–7.45)
POC PH: 7.31 (ref 7.35–7.45)
POC PH: 7.38 (ref 7.35–7.45)
POC PH: 7.38 (ref 7.35–7.45)
POC PH: 7.39 (ref 7.35–7.45)
POC PH: 7.42 (ref 7.35–7.45)
POC PH: 7.43 (ref 7.35–7.45)
POC PH: 7.44 (ref 7.35–7.45)
POC PH: 7.45 (ref 7.35–7.45)
POC PH: 7.45 (ref 7.35–7.45)
POC PH: 7.46 (ref 7.35–7.45)
POC PH: 7.46 (ref 7.35–7.45)
POC PH: 7.47 (ref 7.35–7.45)
POC PH: 7.48 (ref 7.35–7.45)
POC PO2 TEMP: 102 MM HG
POC PO2 TEMP: 420 MM HG
POC PO2 TEMP: 487 MM HG
POC PO2 TEMP: 516 MM HG
POC PO2: 121.5 MM HG (ref 83–108)
POC PO2: 267.1 MM HG (ref 83–108)
POC PO2: 342.1 MM HG (ref 83–108)
POC PO2: 380.2 MM HG (ref 83–108)
POC PO2: 412.1 MM HG (ref 83–108)
POC PO2: 437.3 MM HG (ref 83–108)
POC PO2: 447.9 MM HG (ref 83–108)
POC PO2: 464.8 MM HG (ref 83–108)
POC PO2: 466.7 MM HG (ref 83–108)
POC PO2: 472.9 MM HG (ref 83–108)
POC PO2: 478.1 MM HG (ref 83–108)
POC PO2: 484.5 MM HG (ref 83–108)
POC PO2: 508.4 MM HG (ref 83–108)
POC PO2: 510.9 MM HG (ref 83–108)
POC PO2: 513.4 MM HG (ref 83–108)
POC PO2: 519.2 MM HG (ref 83–108)
POC PO2: 522.7 MM HG (ref 83–108)
POC PO2: 541.6 MM HG (ref 83–108)
POC PO2: 84.1 MM HG (ref 83–108)
POC PO2: 87.1 MM HG (ref 83–108)
POC PO2: 89.4 MM HG (ref 83–108)
POSITIVE BASE EXCESS, ART: 0 (ref 0–3)
POSITIVE BASE EXCESS, ART: 2 (ref 0–3)
POSITIVE BASE EXCESS, ART: 3 (ref 0–3)
POSITIVE BASE EXCESS, ART: 3 (ref 0–3)
POSITIVE BASE EXCESS, ART: 4 (ref 0–3)
POSITIVE BASE EXCESS, ART: 4 (ref 0–3)
POSITIVE BASE EXCESS, ART: 5 (ref 0–3)
POSITIVE BASE EXCESS, ART: 6 (ref 0–3)
POSITIVE BASE EXCESS, ART: 7 (ref 0–3)
POSITIVE BASE EXCESS, ART: 8 (ref 0–3)
POSITIVE BASE EXCESS, ART: 8 (ref 0–3)
POTASSIUM SERPL-SCNC: 3.5 MMOL/L (ref 3.7–5.3)
POTASSIUM SERPL-SCNC: 3.6 MMOL/L (ref 3.7–5.3)
POTASSIUM SERPL-SCNC: 3.7 MMOL/L (ref 3.7–5.3)
POTASSIUM SERPL-SCNC: 3.9 MMOL/L (ref 3.7–5.3)
POTASSIUM SERPL-SCNC: 4 MMOL/L (ref 3.7–5.3)
POTASSIUM SERPL-SCNC: 4.2 MMOL/L (ref 3.7–5.3)
POTASSIUM SERPL-SCNC: 4.5 MMOL/L (ref 3.7–5.3)
PROT SERPL-MCNC: 4.5 G/DL (ref 6.4–8.3)
PROT SERPL-MCNC: 4.6 G/DL (ref 6.4–8.3)
PROT SERPL-MCNC: 4.7 G/DL (ref 6.4–8.3)
PROT SERPL-MCNC: 4.8 G/DL (ref 6.4–8.3)
PROT SERPL-MCNC: 4.9 G/DL (ref 6.4–8.3)
PROT SERPL-MCNC: 5.5 G/DL (ref 6.4–8.3)
PROT SERPL-MCNC: 5.9 G/DL (ref 6.4–8.3)
PROT UR STRIP.AUTO-MCNC: 5.5 MG/DL (ref 5–8)
PROT UR STRIP.AUTO-MCNC: 6.5 MG/DL (ref 5–8)
PROT UR STRIP.AUTO-MCNC: 6.5 MG/DL (ref 5–8)
PROT UR STRIP.AUTO-MCNC: 7 MG/DL (ref 5–8)
PROT UR STRIP.AUTO-MCNC: ABNORMAL MG/DL
PROTHROMBIN TIME: 15.3 SEC (ref 9.1–12.3)
PROTHROMBIN TIME: 15.7 SEC (ref 9.1–12.3)
PROTHROMBIN TIME: 15.8 SEC (ref 9.1–12.3)
PROTHROMBIN TIME: 16 SEC (ref 9.1–12.3)
PROTHROMBIN TIME: 16.3 SEC (ref 9.1–12.3)
PROTHROMBIN TIME: 16.5 SEC (ref 9.1–12.3)
PROTHROMBIN TIME: 19.5 SEC (ref 9.1–12.3)
PROTHROMBIN TIME: 19.6 SEC (ref 9.1–12.3)
PROTHROMBIN TIME: 19.7 SEC (ref 9.1–12.3)
PROTHROMBIN TIME: 19.8 SEC (ref 9.1–12.3)
PROTHROMBIN TIME: 20.3 SEC (ref 9.1–12.3)
PROTHROMBIN TIME: 21.2 SEC (ref 9.1–12.3)
PROTHROMBIN TIME: 22.5 SEC (ref 9.1–12.3)
RBC # BLD: 3.14 M/UL (ref 4.21–5.77)
RBC # BLD: 3.87 M/UL (ref 4.21–5.77)
RBC # BLD: 4.08 M/UL (ref 4.21–5.77)
RBC # BLD: 4.08 M/UL (ref 4.21–5.77)
RBC # BLD: 4.09 M/UL (ref 4.21–5.77)
RBC # BLD: 4.48 M/UL (ref 4.21–5.77)
RBC # BLD: 4.48 M/UL (ref 4.21–5.77)
RBC # BLD: 4.55 M/UL (ref 4.21–5.77)
RBC # BLD: 4.62 M/UL (ref 4.21–5.77)
RBC # BLD: 4.64 M/UL (ref 4.21–5.77)
RBC CLUMPS #/AREA URNS AUTO: ABNORMAL /HPF (ref 0–2)
RBC CLUMPS #/AREA URNS AUTO: ABNORMAL /HPF (ref 0–4)
RBC CLUMPS #/AREA URNS AUTO: ABNORMAL /HPF (ref 0–4)
RBC CLUMPS #/AREA URNS AUTO: NORMAL /HPF (ref 0–4)
REASON FOR REJECTION: NORMAL
REASON FOR REJECTION: NORMAL
RESP SYNCYTIAL VIRUS PCR: NOT DETECTED
RHINO/ENTEROVIRUS PCR: NOT DETECTED
SAMPLE SITE: ABNORMAL
SAMPLE SITE: NORMAL
SARS-COV-2 RDRP RESP QL NAA+PROBE: NOT DETECTED
SARS-COV-2 RNA NPH QL NAA+NON-PROBE: NOT DETECTED
SEG NEUTROPHILS: 76 % (ref 36–66)
SEG NEUTROPHILS: 85 % (ref 36–66)
SEG NEUTROPHILS: 86 % (ref 36–66)
SEG NEUTROPHILS: 88 % (ref 36–66)
SEG NEUTROPHILS: 90 % (ref 36–66)
SEG NEUTROPHILS: 91 % (ref 36–66)
SEGMENTED NEUTROPHILS ABSOLUTE COUNT: 24.26 K/UL (ref 1.8–7.7)
SEGMENTED NEUTROPHILS ABSOLUTE COUNT: 27.03 K/UL (ref 1.8–7.7)
SEGMENTED NEUTROPHILS ABSOLUTE COUNT: 29.95 K/UL (ref 1.8–7.7)
SEGMENTED NEUTROPHILS ABSOLUTE COUNT: 31.16 K/UL (ref 1.8–7.7)
SEGMENTED NEUTROPHILS ABSOLUTE COUNT: 33.74 K/UL (ref 1.8–7.7)
SEGMENTED NEUTROPHILS ABSOLUTE COUNT: 38.66 K/UL (ref 1.8–7.7)
SODIUM SERPL-SCNC: 133 MMOL/L (ref 135–144)
SODIUM SERPL-SCNC: 137 MMOL/L (ref 135–144)
SODIUM SERPL-SCNC: 138 MMOL/L (ref 135–144)
SODIUM SERPL-SCNC: 138 MMOL/L (ref 135–144)
SODIUM SERPL-SCNC: 140 MMOL/L (ref 135–144)
SODIUM SERPL-SCNC: 141 MMOL/L (ref 135–144)
SODIUM SERPL-SCNC: 142 MMOL/L (ref 135–144)
SODIUM SERPL-SCNC: 143 MMOL/L (ref 135–144)
SODIUM SERPL-SCNC: 144 MMOL/L (ref 135–144)
SODIUM SERPL-SCNC: 144 MMOL/L (ref 135–144)
SODIUM SERPL-SCNC: 146 MMOL/L (ref 135–144)
SODIUM SERPL-SCNC: 147 MMOL/L (ref 135–144)
SPECIFIC GRAVITY UA: 1.01 (ref 1–1.03)
SPECIFIC GRAVITY UA: 1.02 (ref 1–1.03)
SPECIMEN DESCRIPTION: ABNORMAL
SPECIMEN DESCRIPTION: NORMAL
TROPONIN I SERPL DL<=0.01 NG/ML-MCNC: 115 NG/L (ref 0–22)
TROPONIN I SERPL DL<=0.01 NG/ML-MCNC: 137 NG/L (ref 0–22)
TROPONIN I SERPL DL<=0.01 NG/ML-MCNC: 142 NG/L (ref 0–22)
TROPONIN I SERPL DL<=0.01 NG/ML-MCNC: 164 NG/L (ref 0–22)
TROPONIN I SERPL DL<=0.01 NG/ML-MCNC: 230 NG/L (ref 0–22)
TROPONIN I SERPL DL<=0.01 NG/ML-MCNC: 89 NG/L (ref 0–22)
TSH SERPL-ACNC: 2.59 UIU/ML (ref 0.3–5)
TURBIDITY: ABNORMAL
TURBIDITY: CLEAR
URINE HGB: ABNORMAL
UROBILINOGEN, URINE: NORMAL
WBC # BLD AUTO: 18.7 K/UL (ref 3.5–11.3)
WBC # BLD AUTO: 23.4 K/UL (ref 3.5–11.3)
WBC # BLD AUTO: 25.2 K/UL (ref 3.5–11.3)
WBC # BLD AUTO: 28.2 K/UL (ref 3.5–11.3)
WBC # BLD AUTO: 31.1 K/UL (ref 3.5–11.3)
WBC # BLD AUTO: 31.8 K/UL (ref 3.5–11.3)
WBC # BLD AUTO: 35.4 K/UL (ref 3.5–11.3)
WBC # BLD AUTO: 37.5 K/UL (ref 3.5–11.3)
WBC # BLD AUTO: 39.4 K/UL (ref 3.5–11.3)
WBC # BLD AUTO: 42.5 K/UL (ref 3.5–11.3)
WBC UA: ABNORMAL /HPF (ref 0–5)
WBC UA: NORMAL /HPF (ref 0–5)
ZZ NTE CLEAN UP: ORDERED TEST: NORMAL
ZZ NTE CLEAN UP: ORDERED TEST: NORMAL
ZZ NTE WITH NAME CLEAN UP: SPECIMEN SOURCE: NORMAL
ZZ NTE WITH NAME CLEAN UP: SPECIMEN SOURCE: NORMAL

## 2023-01-01 PROCEDURE — 86317 IMMUNOASSAY INFECTIOUS AGENT: CPT

## 2023-01-01 PROCEDURE — 6360000002 HC RX W HCPCS: Performed by: STUDENT IN AN ORGANIZED HEALTH CARE EDUCATION/TRAINING PROGRAM

## 2023-01-01 PROCEDURE — 83605 ASSAY OF LACTIC ACID: CPT

## 2023-01-01 PROCEDURE — 2500000003 HC RX 250 WO HCPCS: Performed by: STUDENT IN AN ORGANIZED HEALTH CARE EDUCATION/TRAINING PROGRAM

## 2023-01-01 PROCEDURE — 2700000000 HC OXYGEN THERAPY PER DAY

## 2023-01-01 PROCEDURE — 87040 BLOOD CULTURE FOR BACTERIA: CPT

## 2023-01-01 PROCEDURE — 2000000000 HC ICU R&B

## 2023-01-01 PROCEDURE — 36556 INSERT NON-TUNNEL CV CATH: CPT | Performed by: INTERNAL MEDICINE

## 2023-01-01 PROCEDURE — 83735 ASSAY OF MAGNESIUM: CPT

## 2023-01-01 PROCEDURE — 80048 BASIC METABOLIC PNL TOTAL CA: CPT

## 2023-01-01 PROCEDURE — C9113 INJ PANTOPRAZOLE SODIUM, VIA: HCPCS

## 2023-01-01 PROCEDURE — 5A1D70Z PERFORMANCE OF URINARY FILTRATION, INTERMITTENT, LESS THAN 6 HOURS PER DAY: ICD-10-PCS | Performed by: INTERNAL MEDICINE

## 2023-01-01 PROCEDURE — 93010 ELECTROCARDIOGRAM REPORT: CPT | Performed by: INTERNAL MEDICINE

## 2023-01-01 PROCEDURE — 6360000002 HC RX W HCPCS

## 2023-01-01 PROCEDURE — 2580000003 HC RX 258: Performed by: INTERNAL MEDICINE

## 2023-01-01 PROCEDURE — 95720 EEG PHY/QHP EA INCR W/VEEG: CPT | Performed by: PSYCHIATRY & NEUROLOGY

## 2023-01-01 PROCEDURE — 85610 PROTHROMBIN TIME: CPT

## 2023-01-01 PROCEDURE — 84100 ASSAY OF PHOSPHORUS: CPT

## 2023-01-01 PROCEDURE — 2500000003 HC RX 250 WO HCPCS: Performed by: INTERNAL MEDICINE

## 2023-01-01 PROCEDURE — 94002 VENT MGMT INPAT INIT DAY: CPT

## 2023-01-01 PROCEDURE — 94003 VENT MGMT INPAT SUBQ DAY: CPT

## 2023-01-01 PROCEDURE — 71045 X-RAY EXAM CHEST 1 VIEW: CPT

## 2023-01-01 PROCEDURE — 6360000002 HC RX W HCPCS: Performed by: EMERGENCY MEDICINE

## 2023-01-01 PROCEDURE — 82803 BLOOD GASES ANY COMBINATION: CPT

## 2023-01-01 PROCEDURE — 6370000000 HC RX 637 (ALT 250 FOR IP)

## 2023-01-01 PROCEDURE — 83880 ASSAY OF NATRIURETIC PEPTIDE: CPT

## 2023-01-01 PROCEDURE — 94761 N-INVAS EAR/PLS OXIMETRY MLT: CPT

## 2023-01-01 PROCEDURE — 3700000001 HC ADD 15 MINUTES (ANESTHESIA)

## 2023-01-01 PROCEDURE — 82977 ASSAY OF GGT: CPT

## 2023-01-01 PROCEDURE — 99221 1ST HOSP IP/OBS SF/LOW 40: CPT | Performed by: INTERNAL MEDICINE

## 2023-01-01 PROCEDURE — 80053 COMPREHEN METABOLIC PANEL: CPT

## 2023-01-01 PROCEDURE — 2580000003 HC RX 258

## 2023-01-01 PROCEDURE — 82150 ASSAY OF AMYLASE: CPT

## 2023-01-01 PROCEDURE — 99291 CRITICAL CARE FIRST HOUR: CPT | Performed by: INTERNAL MEDICINE

## 2023-01-01 PROCEDURE — 36620 INSERTION CATHETER ARTERY: CPT

## 2023-01-01 PROCEDURE — 36415 COLL VENOUS BLD VENIPUNCTURE: CPT

## 2023-01-01 PROCEDURE — 86316 IMMUNOASSAY TUMOR OTHER: CPT

## 2023-01-01 PROCEDURE — 82550 ASSAY OF CK (CPK): CPT

## 2023-01-01 PROCEDURE — 82805 BLOOD GASES W/O2 SATURATION: CPT

## 2023-01-01 PROCEDURE — 93306 TTE W/DOPPLER COMPLETE: CPT

## 2023-01-01 PROCEDURE — 3700000000 HC ANESTHESIA ATTENDED CARE

## 2023-01-01 PROCEDURE — 94640 AIRWAY INHALATION TREATMENT: CPT

## 2023-01-01 PROCEDURE — 84443 ASSAY THYROID STIM HORMONE: CPT

## 2023-01-01 PROCEDURE — 37799 UNLISTED PX VASCULAR SURGERY: CPT

## 2023-01-01 PROCEDURE — 2580000003 HC RX 258: Performed by: STUDENT IN AN ORGANIZED HEALTH CARE EDUCATION/TRAINING PROGRAM

## 2023-01-01 PROCEDURE — 81001 URINALYSIS AUTO W/SCOPE: CPT

## 2023-01-01 PROCEDURE — 2709999900 HC NON-CHARGEABLE SUPPLY

## 2023-01-01 PROCEDURE — 82248 BILIRUBIN DIRECT: CPT

## 2023-01-01 PROCEDURE — 6360000002 HC RX W HCPCS: Performed by: INTERNAL MEDICINE

## 2023-01-01 PROCEDURE — 99233 SBSQ HOSP IP/OBS HIGH 50: CPT | Performed by: INTERNAL MEDICINE

## 2023-01-01 PROCEDURE — 74018 RADEX ABDOMEN 1 VIEW: CPT

## 2023-01-01 PROCEDURE — 87086 URINE CULTURE/COLONY COUNT: CPT

## 2023-01-01 PROCEDURE — 5A1945Z RESPIRATORY VENTILATION, 24-96 CONSECUTIVE HOURS: ICD-10-PCS | Performed by: INTERNAL MEDICINE

## 2023-01-01 PROCEDURE — 36600 WITHDRAWAL OF ARTERIAL BLOOD: CPT

## 2023-01-01 PROCEDURE — 99232 SBSQ HOSP IP/OBS MODERATE 35: CPT | Performed by: INTERNAL MEDICINE

## 2023-01-01 PROCEDURE — 99232 SBSQ HOSP IP/OBS MODERATE 35: CPT | Performed by: PSYCHIATRY & NEUROLOGY

## 2023-01-01 PROCEDURE — 93320 DOPPLER ECHO COMPLETE: CPT

## 2023-01-01 PROCEDURE — 84484 ASSAY OF TROPONIN QUANT: CPT

## 2023-01-01 PROCEDURE — 87205 SMEAR GRAM STAIN: CPT

## 2023-01-01 PROCEDURE — 95714 VEEG EA 12-26 HR UNMNTR: CPT

## 2023-01-01 PROCEDURE — 85730 THROMBOPLASTIN TIME PARTIAL: CPT

## 2023-01-01 PROCEDURE — 82947 ASSAY GLUCOSE BLOOD QUANT: CPT

## 2023-01-01 PROCEDURE — 89220 SPUTUM SPECIMEN COLLECTION: CPT

## 2023-01-01 PROCEDURE — 86704 HEP B CORE ANTIBODY TOTAL: CPT

## 2023-01-01 PROCEDURE — 93312 ECHO TRANSESOPHAGEAL: CPT

## 2023-01-01 PROCEDURE — 70450 CT HEAD/BRAIN W/O DYE: CPT

## 2023-01-01 PROCEDURE — 82565 ASSAY OF CREATININE: CPT

## 2023-01-01 PROCEDURE — 85025 COMPLETE CBC W/AUTO DIFF WBC: CPT

## 2023-01-01 PROCEDURE — 96374 THER/PROPH/DIAG INJ IV PUSH: CPT

## 2023-01-01 PROCEDURE — 93005 ELECTROCARDIOGRAM TRACING: CPT | Performed by: STUDENT IN AN ORGANIZED HEALTH CARE EDUCATION/TRAINING PROGRAM

## 2023-01-01 PROCEDURE — 0BH17EZ INSERTION OF ENDOTRACHEAL AIRWAY INTO TRACHEA, VIA NATURAL OR ARTIFICIAL OPENING: ICD-10-PCS | Performed by: INTERNAL MEDICINE

## 2023-01-01 PROCEDURE — 99222 1ST HOSP IP/OBS MODERATE 55: CPT | Performed by: INTERNAL MEDICINE

## 2023-01-01 PROCEDURE — 3600000014 HC SURGERY LEVEL 4 ADDTL 15MIN

## 2023-01-01 PROCEDURE — 3600000004 HC SURGERY LEVEL 4 BASE

## 2023-01-01 PROCEDURE — 6370000000 HC RX 637 (ALT 250 FOR IP): Performed by: STUDENT IN AN ORGANIZED HEALTH CARE EDUCATION/TRAINING PROGRAM

## 2023-01-01 PROCEDURE — 36556 INSERT NON-TUNNEL CV CATH: CPT

## 2023-01-01 PROCEDURE — 85027 COMPLETE CBC AUTOMATED: CPT

## 2023-01-01 PROCEDURE — 2500000003 HC RX 250 WO HCPCS

## 2023-01-01 PROCEDURE — 99223 1ST HOSP IP/OBS HIGH 75: CPT | Performed by: INTERNAL MEDICINE

## 2023-01-01 PROCEDURE — 6370000000 HC RX 637 (ALT 250 FOR IP): Performed by: INTERNAL MEDICINE

## 2023-01-01 PROCEDURE — 51702 INSERT TEMP BLADDER CATH: CPT

## 2023-01-01 PROCEDURE — 90935 HEMODIALYSIS ONE EVALUATION: CPT

## 2023-01-01 PROCEDURE — 95711 VEEG 2-12 HR UNMONITORED: CPT

## 2023-01-01 PROCEDURE — P9047 ALBUMIN (HUMAN), 25%, 50ML: HCPCS | Performed by: STUDENT IN AN ORGANIZED HEALTH CARE EDUCATION/TRAINING PROGRAM

## 2023-01-01 PROCEDURE — 93325 DOPPLER ECHO COLOR FLOW MAPG: CPT

## 2023-01-01 PROCEDURE — 86803 HEPATITIS C AB TEST: CPT

## 2023-01-01 PROCEDURE — 31624 DX BRONCHOSCOPE/LAVAGE: CPT | Performed by: INTERNAL MEDICINE

## 2023-01-01 PROCEDURE — 02HV33Z INSERTION OF INFUSION DEVICE INTO SUPERIOR VENA CAVA, PERCUTANEOUS APPROACH: ICD-10-PCS

## 2023-01-01 PROCEDURE — 83615 LACTATE (LD) (LDH) ENZYME: CPT

## 2023-01-01 PROCEDURE — 87635 SARS-COV-2 COVID-19 AMP PRB: CPT

## 2023-01-01 PROCEDURE — 87070 CULTURE OTHR SPECIMN AEROBIC: CPT

## 2023-01-01 PROCEDURE — 6360000004 HC RX CONTRAST MEDICATION: Performed by: STUDENT IN AN ORGANIZED HEALTH CARE EDUCATION/TRAINING PROGRAM

## 2023-01-01 PROCEDURE — 93005 ELECTROCARDIOGRAM TRACING: CPT | Performed by: PEDIATRICS

## 2023-01-01 PROCEDURE — 99231 SBSQ HOSP IP/OBS SF/LOW 25: CPT | Performed by: INTERNAL MEDICINE

## 2023-01-01 PROCEDURE — 74176 CT ABD & PELVIS W/O CONTRAST: CPT

## 2023-01-01 PROCEDURE — 87449 NOS EACH ORGANISM AG IA: CPT

## 2023-01-01 PROCEDURE — 87641 MR-STAPH DNA AMP PROBE: CPT

## 2023-01-01 PROCEDURE — 80076 HEPATIC FUNCTION PANEL: CPT

## 2023-01-01 PROCEDURE — 82330 ASSAY OF CALCIUM: CPT

## 2023-01-01 PROCEDURE — 99291 CRITICAL CARE FIRST HOUR: CPT

## 2023-01-01 PROCEDURE — 87340 HEPATITIS B SURFACE AG IA: CPT

## 2023-01-01 PROCEDURE — 95700 EEG CONT REC W/VID EEG TECH: CPT

## 2023-01-01 PROCEDURE — 71260 CT THORAX DX C+: CPT | Performed by: STUDENT IN AN ORGANIZED HEALTH CARE EDUCATION/TRAINING PROGRAM

## 2023-01-01 PROCEDURE — 6360000002 HC RX W HCPCS: Performed by: PEDIATRICS

## 2023-01-01 PROCEDURE — 83690 ASSAY OF LIPASE: CPT

## 2023-01-01 PROCEDURE — 82140 ASSAY OF AMMONIA: CPT

## 2023-01-01 PROCEDURE — 0202U NFCT DS 22 TRGT SARS-COV-2: CPT

## 2023-01-01 PROCEDURE — 86738 MYCOPLASMA ANTIBODY: CPT

## 2023-01-01 RX ORDER — CALCIUM GLUCONATE 20 MG/ML
2000 INJECTION, SOLUTION INTRAVENOUS ONCE
Status: COMPLETED | OUTPATIENT
Start: 2023-01-01 | End: 2023-01-01

## 2023-01-01 RX ORDER — LEVOFLOXACIN 5 MG/ML
750 INJECTION, SOLUTION INTRAVENOUS EVERY 24 HOURS
Status: DISCONTINUED | OUTPATIENT
Start: 2023-01-01 | End: 2023-01-01

## 2023-01-01 RX ORDER — INSULIN LISPRO 100 [IU]/ML
0-8 INJECTION, SOLUTION INTRAVENOUS; SUBCUTANEOUS EVERY 4 HOURS
Status: DISCONTINUED | OUTPATIENT
Start: 2023-01-01 | End: 2023-01-01

## 2023-01-01 RX ORDER — MINERAL OIL AND WHITE PETROLATUM 150; 830 MG/G; MG/G
OINTMENT OPHTHALMIC PRN
Status: DISCONTINUED | OUTPATIENT
Start: 2023-01-01 | End: 2023-01-01 | Stop reason: HOSPADM

## 2023-01-01 RX ORDER — DEXTROSE MONOHYDRATE 100 MG/ML
INJECTION, SOLUTION INTRAVENOUS CONTINUOUS PRN
Status: DISCONTINUED | OUTPATIENT
Start: 2023-01-01 | End: 2023-01-01 | Stop reason: HOSPADM

## 2023-01-01 RX ORDER — MAGNESIUM HYDROXIDE 1200 MG/15ML
LIQUID ORAL CONTINUOUS PRN
Status: COMPLETED | OUTPATIENT
Start: 2023-01-01 | End: 2023-01-01

## 2023-01-01 RX ORDER — INSULIN LISPRO 100 [IU]/ML
0-8 INJECTION, SOLUTION INTRAVENOUS; SUBCUTANEOUS EVERY 4 HOURS
Status: DISCONTINUED | OUTPATIENT
Start: 2023-01-01 | End: 2023-01-01 | Stop reason: HOSPADM

## 2023-01-01 RX ORDER — ACETAMINOPHEN 325 MG/1
650 TABLET ORAL EVERY 6 HOURS PRN
Status: DISCONTINUED | OUTPATIENT
Start: 2023-01-01 | End: 2023-01-01

## 2023-01-01 RX ORDER — MORPHINE SULFATE 4 MG/ML
4 INJECTION, SOLUTION INTRAMUSCULAR; INTRAVENOUS
Status: DISCONTINUED | OUTPATIENT
Start: 2023-01-01 | End: 2023-01-01

## 2023-01-01 RX ORDER — ONDANSETRON 4 MG/1
4 TABLET, ORALLY DISINTEGRATING ORAL EVERY 8 HOURS PRN
Status: DISCONTINUED | OUTPATIENT
Start: 2023-01-01 | End: 2023-01-01 | Stop reason: HOSPADM

## 2023-01-01 RX ORDER — MAGNESIUM SULFATE IN WATER 40 MG/ML
2000 INJECTION, SOLUTION INTRAVENOUS PRN
Status: DISCONTINUED | OUTPATIENT
Start: 2023-01-01 | End: 2023-01-01 | Stop reason: HOSPADM

## 2023-01-01 RX ORDER — PROPOFOL 10 MG/ML
INJECTION, EMULSION INTRAVENOUS
Status: COMPLETED
Start: 2023-01-01 | End: 2023-01-01

## 2023-01-01 RX ORDER — LABETALOL HYDROCHLORIDE 5 MG/ML
10 INJECTION, SOLUTION INTRAVENOUS ONCE
Status: COMPLETED | OUTPATIENT
Start: 2023-01-01 | End: 2023-01-01

## 2023-01-01 RX ORDER — METRONIDAZOLE 500 MG/100ML
500 INJECTION, SOLUTION INTRAVENOUS EVERY 12 HOURS
Status: DISCONTINUED | OUTPATIENT
Start: 2023-01-01 | End: 2023-01-01

## 2023-01-01 RX ORDER — HEPARIN SODIUM 5000 [USP'U]/ML
30000 INJECTION, SOLUTION INTRAVENOUS; SUBCUTANEOUS ONCE
Status: COMPLETED | OUTPATIENT
Start: 2023-01-01 | End: 2023-01-01

## 2023-01-01 RX ORDER — HEPARIN SODIUM 1000 [USP'U]/ML
1600 INJECTION, SOLUTION INTRAVENOUS; SUBCUTANEOUS PRN
Status: DISCONTINUED | OUTPATIENT
Start: 2023-01-01 | End: 2023-01-01

## 2023-01-01 RX ORDER — METHYLPREDNISOLONE SODIUM SUCCINATE 40 MG/ML
40 INJECTION, POWDER, LYOPHILIZED, FOR SOLUTION INTRAMUSCULAR; INTRAVENOUS EVERY 8 HOURS
Status: DISCONTINUED | OUTPATIENT
Start: 2023-01-01 | End: 2023-01-01

## 2023-01-01 RX ORDER — LORAZEPAM 2 MG/ML
1 CONCENTRATE ORAL
Status: DISCONTINUED | OUTPATIENT
Start: 2023-01-01 | End: 2023-01-01

## 2023-01-01 RX ORDER — ACETAMINOPHEN 650 MG/1
650 SUPPOSITORY RECTAL EVERY 6 HOURS PRN
Status: DISCONTINUED | OUTPATIENT
Start: 2023-01-01 | End: 2023-01-01

## 2023-01-01 RX ORDER — HEPARIN SODIUM 1000 [USP'U]/ML
1500 INJECTION, SOLUTION INTRAVENOUS; SUBCUTANEOUS PRN
Status: DISCONTINUED | OUTPATIENT
Start: 2023-01-01 | End: 2023-01-01

## 2023-01-01 RX ORDER — PROPOFOL 10 MG/ML
5-50 INJECTION, EMULSION INTRAVENOUS CONTINUOUS
Status: DISCONTINUED | OUTPATIENT
Start: 2023-01-01 | End: 2023-01-01 | Stop reason: HOSPADM

## 2023-01-01 RX ORDER — DIPHENHYDRAMINE HYDROCHLORIDE 50 MG/ML
25 INJECTION INTRAMUSCULAR; INTRAVENOUS EVERY 8 HOURS
Status: DISCONTINUED | OUTPATIENT
Start: 2023-01-01 | End: 2023-01-01 | Stop reason: HOSPADM

## 2023-01-01 RX ORDER — FENTANYL CITRATE 50 UG/ML
50 INJECTION, SOLUTION INTRAMUSCULAR; INTRAVENOUS
Status: DISCONTINUED | OUTPATIENT
Start: 2023-01-01 | End: 2023-01-01 | Stop reason: HOSPADM

## 2023-01-01 RX ORDER — ALBUMIN (HUMAN) 12.5 G/50ML
25 SOLUTION INTRAVENOUS ONCE
Status: COMPLETED | OUTPATIENT
Start: 2023-01-01 | End: 2023-01-01

## 2023-01-01 RX ORDER — HEPARIN SODIUM 1000 [USP'U]/ML
1900 INJECTION, SOLUTION INTRAVENOUS; SUBCUTANEOUS PRN
Status: DISCONTINUED | OUTPATIENT
Start: 2023-01-01 | End: 2023-01-01 | Stop reason: HOSPADM

## 2023-01-01 RX ORDER — IPRATROPIUM BROMIDE AND ALBUTEROL SULFATE 2.5; .5 MG/3ML; MG/3ML
1 SOLUTION RESPIRATORY (INHALATION) EVERY 4 HOURS
Status: DISCONTINUED | OUTPATIENT
Start: 2023-01-01 | End: 2023-01-01 | Stop reason: HOSPADM

## 2023-01-01 RX ORDER — ONDANSETRON 2 MG/ML
4 INJECTION INTRAMUSCULAR; INTRAVENOUS EVERY 6 HOURS PRN
Status: DISCONTINUED | OUTPATIENT
Start: 2023-01-01 | End: 2023-01-01 | Stop reason: HOSPADM

## 2023-01-01 RX ORDER — MORPHINE SULFATE 4 MG/ML
4 INJECTION, SOLUTION INTRAMUSCULAR; INTRAVENOUS
Status: DISCONTINUED | OUTPATIENT
Start: 2023-01-01 | End: 2023-01-01 | Stop reason: HOSPADM

## 2023-01-01 RX ORDER — SODIUM CHLORIDE, SODIUM LACTATE, POTASSIUM CHLORIDE, AND CALCIUM CHLORIDE .6; .31; .03; .02 G/100ML; G/100ML; G/100ML; G/100ML
30 INJECTION, SOLUTION INTRAVENOUS ONCE
Status: COMPLETED | OUTPATIENT
Start: 2023-01-01 | End: 2023-01-01

## 2023-01-01 RX ORDER — MORPHINE SULFATE 2 MG/ML
2 INJECTION, SOLUTION INTRAMUSCULAR; INTRAVENOUS
Status: DISCONTINUED | OUTPATIENT
Start: 2023-01-01 | End: 2023-01-01

## 2023-01-01 RX ORDER — METHYLPREDNISOLONE SODIUM SUCCINATE 40 MG/ML
40 INJECTION, POWDER, LYOPHILIZED, FOR SOLUTION INTRAMUSCULAR; INTRAVENOUS DAILY
Status: DISCONTINUED | OUTPATIENT
Start: 2023-01-01 | End: 2023-01-01

## 2023-01-01 RX ORDER — MOXIFLOXACIN HYDROCHLORIDE 400 MG/250ML
400 INJECTION, SOLUTION INTRAVENOUS EVERY 24 HOURS
Status: DISCONTINUED | OUTPATIENT
Start: 2023-01-01 | End: 2023-01-01 | Stop reason: HOSPADM

## 2023-01-01 RX ORDER — POTASSIUM CHLORIDE 29.8 MG/ML
20 INJECTION INTRAVENOUS PRN
Status: DISCONTINUED | OUTPATIENT
Start: 2023-01-01 | End: 2023-01-01 | Stop reason: HOSPADM

## 2023-01-01 RX ORDER — LORAZEPAM 2 MG/ML
1 INJECTION INTRAMUSCULAR
Status: DISCONTINUED | OUTPATIENT
Start: 2023-01-01 | End: 2023-01-01 | Stop reason: HOSPADM

## 2023-01-01 RX ORDER — SODIUM CHLORIDE FOR INHALATION 3 %
4 VIAL, NEBULIZER (ML) INHALATION EVERY 8 HOURS
Status: DISCONTINUED | OUTPATIENT
Start: 2023-01-01 | End: 2023-01-01 | Stop reason: HOSPADM

## 2023-01-01 RX ORDER — SODIUM CHLORIDE 9 MG/ML
INJECTION, SOLUTION INTRAVENOUS PRN
Status: DISCONTINUED | OUTPATIENT
Start: 2023-01-01 | End: 2023-01-01 | Stop reason: HOSPADM

## 2023-01-01 RX ORDER — POTASSIUM CHLORIDE 7.45 MG/ML
10 INJECTION INTRAVENOUS PRN
Status: DISCONTINUED | OUTPATIENT
Start: 2023-01-01 | End: 2023-01-01 | Stop reason: HOSPADM

## 2023-01-01 RX ORDER — NOREPINEPHRINE BIT/0.9 % NACL 16MG/250ML
2-100 INFUSION BOTTLE (ML) INTRAVENOUS CONTINUOUS
Status: DISCONTINUED | OUTPATIENT
Start: 2023-01-01 | End: 2023-01-01 | Stop reason: HOSPADM

## 2023-01-01 RX ORDER — SODIUM CHLORIDE 0.9 % (FLUSH) 0.9 %
5-40 SYRINGE (ML) INJECTION EVERY 12 HOURS SCHEDULED
Status: DISCONTINUED | OUTPATIENT
Start: 2023-01-01 | End: 2023-01-01 | Stop reason: HOSPADM

## 2023-01-01 RX ORDER — 0.9 % SODIUM CHLORIDE 0.9 %
1000 INTRAVENOUS SOLUTION INTRAVENOUS ONCE
Status: COMPLETED | OUTPATIENT
Start: 2023-01-01 | End: 2023-01-01

## 2023-01-01 RX ORDER — HEPARIN SODIUM 1000 [USP'U]/ML
1600 INJECTION, SOLUTION INTRAVENOUS; SUBCUTANEOUS PRN
Status: DISCONTINUED | OUTPATIENT
Start: 2023-01-01 | End: 2023-01-01 | Stop reason: HOSPADM

## 2023-01-01 RX ORDER — INSULIN LISPRO 100 [IU]/ML
0-4 INJECTION, SOLUTION INTRAVENOUS; SUBCUTANEOUS NIGHTLY
Status: DISCONTINUED | OUTPATIENT
Start: 2023-01-01 | End: 2023-01-01

## 2023-01-01 RX ORDER — 0.9 % SODIUM CHLORIDE 0.9 %
250 INTRAVENOUS SOLUTION INTRAVENOUS ONCE
Status: DISCONTINUED | OUTPATIENT
Start: 2023-01-01 | End: 2023-01-01 | Stop reason: HOSPADM

## 2023-01-01 RX ORDER — MORPHINE SULFATE 2 MG/ML
2 INJECTION, SOLUTION INTRAMUSCULAR; INTRAVENOUS
Status: DISCONTINUED | OUTPATIENT
Start: 2023-01-01 | End: 2023-01-01 | Stop reason: HOSPADM

## 2023-01-01 RX ORDER — ENOXAPARIN SODIUM 100 MG/ML
40 INJECTION SUBCUTANEOUS DAILY
Status: DISCONTINUED | OUTPATIENT
Start: 2023-01-01 | End: 2023-01-01 | Stop reason: HOSPADM

## 2023-01-01 RX ORDER — SODIUM CHLORIDE 0.9 % (FLUSH) 0.9 %
5-40 SYRINGE (ML) INJECTION PRN
Status: DISCONTINUED | OUTPATIENT
Start: 2023-01-01 | End: 2023-01-01 | Stop reason: HOSPADM

## 2023-01-01 RX ORDER — GLYCOPYRROLATE 0.2 MG/ML
0.2 INJECTION INTRAMUSCULAR; INTRAVENOUS EVERY 4 HOURS PRN
Status: DISCONTINUED | OUTPATIENT
Start: 2023-01-01 | End: 2023-01-01 | Stop reason: HOSPADM

## 2023-01-01 RX ORDER — FUROSEMIDE 10 MG/ML
80 INJECTION INTRAMUSCULAR; INTRAVENOUS ONCE
Status: COMPLETED | OUTPATIENT
Start: 2023-01-01 | End: 2023-01-01

## 2023-01-01 RX ORDER — INSULIN LISPRO 100 [IU]/ML
0-16 INJECTION, SOLUTION INTRAVENOUS; SUBCUTANEOUS
Status: DISCONTINUED | OUTPATIENT
Start: 2023-01-01 | End: 2023-01-01

## 2023-01-01 RX ORDER — POLYETHYLENE GLYCOL 3350 17 G/17G
17 POWDER, FOR SOLUTION ORAL DAILY PRN
Status: DISCONTINUED | OUTPATIENT
Start: 2023-01-01 | End: 2023-01-01 | Stop reason: HOSPADM

## 2023-01-01 RX ADMIN — DIPHENHYDRAMINE HYDROCHLORIDE 25 MG: 50 INJECTION, SOLUTION INTRAMUSCULAR; INTRAVENOUS at 15:00

## 2023-01-01 RX ADMIN — ENOXAPARIN SODIUM 40 MG: 100 INJECTION SUBCUTANEOUS at 08:20

## 2023-01-01 RX ADMIN — PROPOFOL 15 MCG/KG/MIN: 10 INJECTION, EMULSION INTRAVENOUS at 20:27

## 2023-01-01 RX ADMIN — DIPHENHYDRAMINE HYDROCHLORIDE 25 MG: 50 INJECTION, SOLUTION INTRAMUSCULAR; INTRAVENOUS at 20:56

## 2023-01-01 RX ADMIN — IPRATROPIUM BROMIDE AND ALBUTEROL SULFATE 1 AMPULE: 2.5; .5 SOLUTION RESPIRATORY (INHALATION) at 11:33

## 2023-01-01 RX ADMIN — Medication 2 MCG/MIN: at 18:24

## 2023-01-01 RX ADMIN — SODIUM BICARBONATE: 84 INJECTION, SOLUTION INTRAVENOUS at 16:01

## 2023-01-01 RX ADMIN — SODIUM CHLORIDE, PRESERVATIVE FREE 40 MG: 5 INJECTION INTRAVENOUS at 10:46

## 2023-01-01 RX ADMIN — Medication 250 ML: at 18:14

## 2023-01-01 RX ADMIN — PROPOFOL 35 MCG/KG/MIN: 10 INJECTION, EMULSION INTRAVENOUS at 03:23

## 2023-01-01 RX ADMIN — MOXIFLOXACIN HYDROCHLORIDE 400 MG: 400 INJECTION, SOLUTION INTRAVENOUS at 06:10

## 2023-01-01 RX ADMIN — SODIUM BICARBONATE: 84 INJECTION, SOLUTION INTRAVENOUS at 23:27

## 2023-01-01 RX ADMIN — SODIUM CHLORIDE, PRESERVATIVE FREE 40 MG: 5 INJECTION INTRAVENOUS at 09:59

## 2023-01-01 RX ADMIN — IPRATROPIUM BROMIDE AND ALBUTEROL SULFATE 1 AMPULE: 2.5; .5 SOLUTION RESPIRATORY (INHALATION) at 23:58

## 2023-01-01 RX ADMIN — DIPHENHYDRAMINE HYDROCHLORIDE 25 MG: 50 INJECTION, SOLUTION INTRAMUSCULAR; INTRAVENOUS at 22:38

## 2023-01-01 RX ADMIN — AZTREONAM 1000 MG: 1 INJECTION, POWDER, LYOPHILIZED, FOR SOLUTION INTRAMUSCULAR; INTRAVENOUS at 22:31

## 2023-01-01 RX ADMIN — CALCIUM GLUCONATE 2000 MG: 20 INJECTION, SOLUTION INTRAVENOUS at 21:00

## 2023-01-01 RX ADMIN — DIPHENHYDRAMINE HYDROCHLORIDE 25 MG: 50 INJECTION, SOLUTION INTRAMUSCULAR; INTRAVENOUS at 22:12

## 2023-01-01 RX ADMIN — METHYLPREDNISOLONE SODIUM SUCCINATE 40 MG: 40 INJECTION, POWDER, FOR SOLUTION INTRAMUSCULAR; INTRAVENOUS at 07:58

## 2023-01-01 RX ADMIN — SODIUM CHLORIDE, PRESERVATIVE FREE 10 ML: 5 INJECTION INTRAVENOUS at 20:28

## 2023-01-01 RX ADMIN — SODIUM CHLORIDE 30 MG/ML INHALATION SOLUTION 4 ML: 30 SOLUTION INHALANT at 00:13

## 2023-01-01 RX ADMIN — SODIUM CHLORIDE: 9 INJECTION, SOLUTION INTRAVENOUS at 08:56

## 2023-01-01 RX ADMIN — HEPARIN SODIUM 1600 UNITS: 1000 INJECTION INTRAVENOUS; SUBCUTANEOUS at 15:00

## 2023-01-01 RX ADMIN — SODIUM CHLORIDE, PRESERVATIVE FREE 10 ML: 5 INJECTION INTRAVENOUS at 20:55

## 2023-01-01 RX ADMIN — IPRATROPIUM BROMIDE AND ALBUTEROL SULFATE 1 AMPULE: 2.5; .5 SOLUTION RESPIRATORY (INHALATION) at 23:52

## 2023-01-01 RX ADMIN — DIPHENHYDRAMINE HYDROCHLORIDE 25 MG: 50 INJECTION, SOLUTION INTRAMUSCULAR; INTRAVENOUS at 15:32

## 2023-01-01 RX ADMIN — SODIUM CHLORIDE, PRESERVATIVE FREE 10 ML: 5 INJECTION INTRAVENOUS at 08:45

## 2023-01-01 RX ADMIN — IPRATROPIUM BROMIDE AND ALBUTEROL SULFATE 1 AMPULE: 2.5; .5 SOLUTION RESPIRATORY (INHALATION) at 20:17

## 2023-01-01 RX ADMIN — SODIUM BICARBONATE: 84 INJECTION, SOLUTION INTRAVENOUS at 07:31

## 2023-01-01 RX ADMIN — MOXIFLOXACIN HYDROCHLORIDE 400 MG: 400 INJECTION, SOLUTION INTRAVENOUS at 06:25

## 2023-01-01 RX ADMIN — HEPARIN SODIUM 1900 UNITS: 1000 INJECTION INTRAVENOUS; SUBCUTANEOUS at 15:00

## 2023-01-01 RX ADMIN — SODIUM CHLORIDE, PRESERVATIVE FREE 10 ML: 5 INJECTION INTRAVENOUS at 08:14

## 2023-01-01 RX ADMIN — DIPHENHYDRAMINE HYDROCHLORIDE 25 MG: 50 INJECTION, SOLUTION INTRAMUSCULAR; INTRAVENOUS at 06:24

## 2023-01-01 RX ADMIN — SODIUM BICARBONATE: 84 INJECTION, SOLUTION INTRAVENOUS at 00:49

## 2023-01-01 RX ADMIN — FENTANYL CITRATE 50 MCG: 50 INJECTION, SOLUTION INTRAMUSCULAR; INTRAVENOUS at 06:26

## 2023-01-01 RX ADMIN — SODIUM CHLORIDE 1000 ML: 9 INJECTION, SOLUTION INTRAVENOUS at 20:29

## 2023-01-01 RX ADMIN — AZTREONAM 1000 MG: 1 INJECTION, POWDER, LYOPHILIZED, FOR SOLUTION INTRAMUSCULAR; INTRAVENOUS at 08:46

## 2023-01-01 RX ADMIN — SODIUM CHLORIDE, PRESERVATIVE FREE 40 MG: 5 INJECTION INTRAVENOUS at 22:51

## 2023-01-01 RX ADMIN — SODIUM BICARBONATE: 84 INJECTION, SOLUTION INTRAVENOUS at 06:25

## 2023-01-01 RX ADMIN — AZTREONAM 1000 MG: 1 INJECTION, POWDER, LYOPHILIZED, FOR SOLUTION INTRAMUSCULAR; INTRAVENOUS at 20:28

## 2023-01-01 RX ADMIN — DIPHENHYDRAMINE HYDROCHLORIDE 25 MG: 50 INJECTION, SOLUTION INTRAMUSCULAR; INTRAVENOUS at 22:47

## 2023-01-01 RX ADMIN — SODIUM CHLORIDE: 9 INJECTION, SOLUTION INTRAVENOUS at 05:36

## 2023-01-01 RX ADMIN — IPRATROPIUM BROMIDE AND ALBUTEROL SULFATE 1 AMPULE: 2.5; .5 SOLUTION RESPIRATORY (INHALATION) at 08:49

## 2023-01-01 RX ADMIN — ENOXAPARIN SODIUM 40 MG: 100 INJECTION SUBCUTANEOUS at 08:14

## 2023-01-01 RX ADMIN — IPRATROPIUM BROMIDE AND ALBUTEROL SULFATE 1 AMPULE: 2.5; .5 SOLUTION RESPIRATORY (INHALATION) at 03:30

## 2023-01-01 RX ADMIN — Medication 4 MG: at 13:17

## 2023-01-01 RX ADMIN — SODIUM CHLORIDE, PRESERVATIVE FREE 40 MG: 5 INJECTION INTRAVENOUS at 08:46

## 2023-01-01 RX ADMIN — SODIUM CHLORIDE 30 MG/ML INHALATION SOLUTION 4 ML: 30 SOLUTION INHALANT at 23:27

## 2023-01-01 RX ADMIN — IPRATROPIUM BROMIDE AND ALBUTEROL SULFATE 1 AMPULE: 2.5; .5 SOLUTION RESPIRATORY (INHALATION) at 19:37

## 2023-01-01 RX ADMIN — IPRATROPIUM BROMIDE AND ALBUTEROL SULFATE 1 AMPULE: 2.5; .5 SOLUTION RESPIRATORY (INHALATION) at 07:56

## 2023-01-01 RX ADMIN — SODIUM CHLORIDE, PRESERVATIVE FREE 40 MG: 5 INJECTION INTRAVENOUS at 22:47

## 2023-01-01 RX ADMIN — METRONIDAZOLE 500 MG: 500 INJECTION, SOLUTION INTRAVENOUS at 07:33

## 2023-01-01 RX ADMIN — METHYLPREDNISOLONE SODIUM SUCCINATE 40 MG: 40 INJECTION, POWDER, FOR SOLUTION INTRAMUSCULAR; INTRAVENOUS at 22:59

## 2023-01-01 RX ADMIN — SODIUM CHLORIDE, PRESERVATIVE FREE 40 MG: 5 INJECTION INTRAVENOUS at 22:13

## 2023-01-01 RX ADMIN — ACETAMINOPHEN 650 MG: 650 SUPPOSITORY RECTAL at 23:15

## 2023-01-01 RX ADMIN — MOXIFLOXACIN HYDROCHLORIDE 400 MG: 400 INJECTION, SOLUTION INTRAVENOUS at 06:14

## 2023-01-01 RX ADMIN — ACETAMINOPHEN 650 MG: 650 SUPPOSITORY RECTAL at 08:04

## 2023-01-01 RX ADMIN — MINERAL OIL, PETROLATUM: 425; 573 OINTMENT OPHTHALMIC at 13:20

## 2023-01-01 RX ADMIN — HEPARIN SODIUM 1600 UNITS: 1000 INJECTION INTRAVENOUS; SUBCUTANEOUS at 18:37

## 2023-01-01 RX ADMIN — MOXIFLOXACIN HYDROCHLORIDE 400 MG: 400 INJECTION, SOLUTION INTRAVENOUS at 06:24

## 2023-01-01 RX ADMIN — MINERAL OIL, PETROLATUM: 425; 573 OINTMENT OPHTHALMIC at 08:46

## 2023-01-01 RX ADMIN — SODIUM BICARBONATE: 84 INJECTION, SOLUTION INTRAVENOUS at 15:39

## 2023-01-01 RX ADMIN — SODIUM CHLORIDE 30 MG/ML INHALATION SOLUTION 4 ML: 30 SOLUTION INHALANT at 08:49

## 2023-01-01 RX ADMIN — DIPHENHYDRAMINE HYDROCHLORIDE 25 MG: 50 INJECTION, SOLUTION INTRAMUSCULAR; INTRAVENOUS at 22:59

## 2023-01-01 RX ADMIN — DIPHENHYDRAMINE HYDROCHLORIDE 25 MG: 50 INJECTION, SOLUTION INTRAMUSCULAR; INTRAVENOUS at 06:15

## 2023-01-01 RX ADMIN — ENOXAPARIN SODIUM 40 MG: 100 INJECTION SUBCUTANEOUS at 08:45

## 2023-01-01 RX ADMIN — SODIUM CHLORIDE: 9 INJECTION, SOLUTION INTRAVENOUS at 04:38

## 2023-01-01 RX ADMIN — SODIUM CHLORIDE, PRESERVATIVE FREE 10 ML: 5 INJECTION INTRAVENOUS at 19:58

## 2023-01-01 RX ADMIN — IPRATROPIUM BROMIDE AND ALBUTEROL SULFATE 1 AMPULE: 2.5; .5 SOLUTION RESPIRATORY (INHALATION) at 15:41

## 2023-01-01 RX ADMIN — SODIUM BICARBONATE: 84 INJECTION, SOLUTION INTRAVENOUS at 08:53

## 2023-01-01 RX ADMIN — MINERAL OIL, PETROLATUM: 425; 573 OINTMENT OPHTHALMIC at 19:41

## 2023-01-01 RX ADMIN — HEPARIN SODIUM 30000 UNITS: 5000 INJECTION, SOLUTION INTRAVENOUS; SUBCUTANEOUS at 13:16

## 2023-01-01 RX ADMIN — ENOXAPARIN SODIUM 40 MG: 100 INJECTION SUBCUTANEOUS at 10:38

## 2023-01-01 RX ADMIN — SODIUM CHLORIDE: 9 INJECTION, SOLUTION INTRAVENOUS at 19:57

## 2023-01-01 RX ADMIN — HEPARIN SODIUM 1900 UNITS: 1000 INJECTION INTRAVENOUS; SUBCUTANEOUS at 18:26

## 2023-01-01 RX ADMIN — SODIUM CHLORIDE 30 MG/ML INHALATION SOLUTION 4 ML: 30 SOLUTION INHALANT at 07:56

## 2023-01-01 RX ADMIN — DIPHENHYDRAMINE HYDROCHLORIDE 25 MG: 50 INJECTION, SOLUTION INTRAMUSCULAR; INTRAVENOUS at 15:19

## 2023-01-01 RX ADMIN — MINERAL OIL, PETROLATUM: 425; 573 OINTMENT OPHTHALMIC at 15:01

## 2023-01-01 RX ADMIN — IPRATROPIUM BROMIDE AND ALBUTEROL SULFATE 1 AMPULE: 2.5; .5 SOLUTION RESPIRATORY (INHALATION) at 03:27

## 2023-01-01 RX ADMIN — SODIUM BICARBONATE: 84 INJECTION, SOLUTION INTRAVENOUS at 10:38

## 2023-01-01 RX ADMIN — LEVOFLOXACIN 750 MG: 5 INJECTION, SOLUTION INTRAVENOUS at 05:37

## 2023-01-01 RX ADMIN — SODIUM CHLORIDE, POTASSIUM CHLORIDE, SODIUM LACTATE AND CALCIUM CHLORIDE 2817 ML: 600; 310; 30; 20 INJECTION, SOLUTION INTRAVENOUS at 01:42

## 2023-01-01 RX ADMIN — SODIUM CHLORIDE, PRESERVATIVE FREE 10 ML: 5 INJECTION INTRAVENOUS at 19:41

## 2023-01-01 RX ADMIN — SODIUM CHLORIDE, PRESERVATIVE FREE 10 ML: 5 INJECTION INTRAVENOUS at 07:59

## 2023-01-01 RX ADMIN — SODIUM CHLORIDE, PRESERVATIVE FREE 40 MG: 5 INJECTION INTRAVENOUS at 22:37

## 2023-01-01 RX ADMIN — IPRATROPIUM BROMIDE AND ALBUTEROL SULFATE 1 AMPULE: 2.5; .5 SOLUTION RESPIRATORY (INHALATION) at 11:27

## 2023-01-01 RX ADMIN — LABETALOL HYDROCHLORIDE 10 MG: 5 INJECTION, SOLUTION INTRAVENOUS at 06:03

## 2023-01-01 RX ADMIN — IOPAMIDOL 75 ML: 755 INJECTION, SOLUTION INTRAVENOUS at 21:32

## 2023-01-01 RX ADMIN — CALCIUM GLUCONATE 2000 MG: 20 INJECTION, SOLUTION INTRAVENOUS at 13:12

## 2023-01-01 RX ADMIN — MORPHINE SULFATE 4 MG: 2 INJECTION, SOLUTION INTRAMUSCULAR; INTRAVENOUS at 13:16

## 2023-01-01 RX ADMIN — AZTREONAM 1000 MG: 1 INJECTION, POWDER, LYOPHILIZED, FOR SOLUTION INTRAMUSCULAR; INTRAVENOUS at 07:54

## 2023-01-01 RX ADMIN — SODIUM BICARBONATE: 84 INJECTION, SOLUTION INTRAVENOUS at 22:47

## 2023-01-01 RX ADMIN — DIPHENHYDRAMINE HYDROCHLORIDE 25 MG: 50 INJECTION, SOLUTION INTRAMUSCULAR; INTRAVENOUS at 05:53

## 2023-01-01 RX ADMIN — DIPHENHYDRAMINE HYDROCHLORIDE 25 MG: 50 INJECTION, SOLUTION INTRAMUSCULAR; INTRAVENOUS at 14:54

## 2023-01-01 RX ADMIN — IPRATROPIUM BROMIDE AND ALBUTEROL SULFATE 1 AMPULE: 2.5; .5 SOLUTION RESPIRATORY (INHALATION) at 23:29

## 2023-01-01 RX ADMIN — FUROSEMIDE 80 MG: 10 INJECTION, SOLUTION INTRAMUSCULAR; INTRAVENOUS at 21:54

## 2023-01-01 RX ADMIN — SODIUM CHLORIDE 30 MG/ML INHALATION SOLUTION 4 ML: 30 SOLUTION INHALANT at 15:51

## 2023-01-01 RX ADMIN — ALBUMIN (HUMAN) 25 G: 0.25 INJECTION, SOLUTION INTRAVENOUS at 20:52

## 2023-01-01 RX ADMIN — SODIUM BICARBONATE: 84 INJECTION, SOLUTION INTRAVENOUS at 18:29

## 2023-01-01 RX ADMIN — MINERAL OIL, PETROLATUM: 425; 573 OINTMENT OPHTHALMIC at 22:19

## 2023-01-01 RX ADMIN — SODIUM BICARBONATE: 84 INJECTION, SOLUTION INTRAVENOUS at 02:13

## 2023-01-01 RX ADMIN — IPRATROPIUM BROMIDE AND ALBUTEROL SULFATE 1 AMPULE: 2.5; .5 SOLUTION RESPIRATORY (INHALATION) at 08:01

## 2023-01-01 RX ADMIN — AZTREONAM 1000 MG: 1 INJECTION, POWDER, LYOPHILIZED, FOR SOLUTION INTRAMUSCULAR; INTRAVENOUS at 20:02

## 2023-01-01 ASSESSMENT — PULMONARY FUNCTION TESTS
PIF_VALUE: 20
PIF_VALUE: 19
PIF_VALUE: 24
PIF_VALUE: 21
PIF_VALUE: 19
PIF_VALUE: 16
PIF_VALUE: 20
PIF_VALUE: 19
PIF_VALUE: 21
PIF_VALUE: 25
PIF_VALUE: 20
PIF_VALUE: 16
PIF_VALUE: 11
PIF_VALUE: 28
PIF_VALUE: 13
PIF_VALUE: 19
PIF_VALUE: 16
PIF_VALUE: 21
PIF_VALUE: 27
PIF_VALUE: 15
PIF_VALUE: 20
PIF_VALUE: 19
PIF_VALUE: 16
PIF_VALUE: 17
PIF_VALUE: 16
PIF_VALUE: 12
PIF_VALUE: 20
PIF_VALUE: 16
PIF_VALUE: 21
PIF_VALUE: 20
PIF_VALUE: 19
PIF_VALUE: 20
PIF_VALUE: 20
PIF_VALUE: 19
PIF_VALUE: 21
PIF_VALUE: 20
PIF_VALUE: 16
PIF_VALUE: 18
PIF_VALUE: 16
PIF_VALUE: 20
PIF_VALUE: 21
PIF_VALUE: 15
PIF_VALUE: 20
PIF_VALUE: 16
PIF_VALUE: 19
PIF_VALUE: 18
PIF_VALUE: 23
PIF_VALUE: 19
PIF_VALUE: 20
PIF_VALUE: 24
PIF_VALUE: 14
PIF_VALUE: 19
PIF_VALUE: 19
PIF_VALUE: 16
PIF_VALUE: 18
PIF_VALUE: 19
PIF_VALUE: 16
PIF_VALUE: 20
PIF_VALUE: 14
PIF_VALUE: 14
PIF_VALUE: 20
PIF_VALUE: 21
PIF_VALUE: 16
PIF_VALUE: 20
PIF_VALUE: 20
PIF_VALUE: 15
PIF_VALUE: 16
PIF_VALUE: 13
PIF_VALUE: 19
PIF_VALUE: 17
PIF_VALUE: 20
PIF_VALUE: 24
PIF_VALUE: 19
PIF_VALUE: 19
PIF_VALUE: 12
PIF_VALUE: 24
PIF_VALUE: 12
PIF_VALUE: 17
PIF_VALUE: 20
PIF_VALUE: 13
PIF_VALUE: 15
PIF_VALUE: 19
PIF_VALUE: 19
PIF_VALUE: 26
PIF_VALUE: 20
PIF_VALUE: 16
PIF_VALUE: 18
PIF_VALUE: 16
PIF_VALUE: 21
PIF_VALUE: 18
PIF_VALUE: 16
PIF_VALUE: 20
PIF_VALUE: 13
PIF_VALUE: 24
PIF_VALUE: 21
PIF_VALUE: 19
PIF_VALUE: 20
PIF_VALUE: 16
PIF_VALUE: 20
PIF_VALUE: 15
PIF_VALUE: 21
PIF_VALUE: 17
PIF_VALUE: 20
PIF_VALUE: 13
PIF_VALUE: 19
PIF_VALUE: 17
PIF_VALUE: 19

## 2023-01-01 ASSESSMENT — PAIN - FUNCTIONAL ASSESSMENT: PAIN_FUNCTIONAL_ASSESSMENT: ADULT NONVERBAL PAIN SCALE (NPVS)

## 2023-02-09 ENCOUNTER — HOSPITAL ENCOUNTER (EMERGENCY)
Age: 28
Discharge: HOME | End: 2023-02-09
Attending: EMERGENCY MEDICINE
Payer: MEDICARE

## 2023-02-09 VITALS
OXYGEN SATURATION: 96 % | TEMPERATURE: 99 F | HEART RATE: 118 BPM | SYSTOLIC BLOOD PRESSURE: 121 MMHG | RESPIRATION RATE: 18 BRPM | DIASTOLIC BLOOD PRESSURE: 64 MMHG

## 2023-02-09 DIAGNOSIS — B34.9 VIRAL ILLNESS: Primary | ICD-10-CM

## 2023-02-09 PROCEDURE — 99282 EMERGENCY DEPT VISIT SF MDM: CPT

## 2023-02-09 ASSESSMENT — ENCOUNTER SYMPTOMS
SHORTNESS OF BREATH: 0
COUGH: 1
SORE THROAT: 0
ABDOMINAL DISTENTION: 0
BACK PAIN: 0

## 2023-02-09 NOTE — DISCHARGE INSTRUCTIONS
Recommend humidifier in the room to help keep the nose moist.  Continue with saline rinses or you can also apply some Vaseline to help keep the nose moist.  This will help with the bleeding episodes as well. You can certainly try Claritin or Sudafed to help with the congestion. Follow-up next week with his primary care doctor if symptoms do not improve. For the blisters in his mouth, recommend oral mouthwash rinses 2-3 times every day to cleanse the area well. Tylenol or ibuprofen as needed for pain and fever.

## 2023-02-09 NOTE — ED PROVIDER NOTES
[facility] ED  EMERGENCY DEPARTMENT ENCOUNTER      Pt Name: Michelle Falcon  MRN: [id number]  [place] 1995  Date of evaluation: 2/9/2023  Provider: Francheska Zaldivar, [address] [place] [address]       Chief Complaint   Patient presents with    Hiccups    Cough     Blood in sputum           HISTORY OF PRESENT ILLNESS   (Location/Symptom, Timing/Onset, Context/Setting, Quality, Duration, Modifying Factors, Severity)  Note limiting factors. Michelle Falcon is a 32 y.o. male who presents to the emergency department     REVIEW OF SYSTEMS    (2-9 systems for level 4, 10 or more for level 5)     Review of Systems    Except as noted above the remainder of the review of systems was reviewed and negative. PAST MEDICAL HISTORY     Past Medical History:   Diagnosis Date    Allergic rhinitis     Anxiety     MR (mental retardation)     Sleep difficulties          SURGICAL HISTORY     No past surgical history on file. CURRENT MEDICATIONS       Previous Medications    CLONIDINE (CATAPRES) 0.2 MG TABLET    Take 1 tablet by mouth 2 times daily    DOCUSATE SODIUM (COLACE) 100 MG CAPSULE    Take 1 capsule by mouth daily as needed for Constipation. ERYTHROMYCIN WITH ETHANOL (EMGEL) 2 % GEL    Apply topically daily.     FLUTICASONE (FLONASE) 50 MCG/ACT NASAL SPRAY    1 spray by Nasal route daily    LORATADINE (CLARITIN) 10 MG TABLET    Take 1 tablet by mouth daily    POLYETHYLENE GLYCOL 3350 (MIRALAX PO)    Take by mouth as needed    PREDNISONE (DELTASONE) 50 MG TABLET    Take 1 tablet by mouth daily       ALLERGIES     Bactrim [sulfamethoxazole-trimethoprim]    FAMILY HISTORY       Family History   Problem Relation Age of Onset    Coronary Art Dis Father     High Blood Pressure Father     High Cholesterol Father     Cancer Paternal Grandfather         colon          SOCIAL HISTORY       Social History     Socioeconomic History    Marital status: Single   Tobacco Use    Smoking status: Never   Substance and Sexual Activity    Alcohol use: No    Drug use: No    Sexual activity: Never       SCREENINGS        [place] Coma Scale  Eye Opening: Spontaneous               PHYSICAL EXAM    (up to 7 for level 4, 8 or more for level 5)     ED Triage Vitals [02/09/23 0332]   BP Temp Temp Source Heart Rate Resp SpO2 Height Weight   121/64 99 °F (37.2 °C) Oral (!) 118 18 96 % -- --       Physical Exam    DIAGNOSTIC RESULTS     EKG: All EKG's are interpreted by the Emergency Department Physician who either signs or Co-signs this chart in the absence of a cardiologist.    ***    RADIOLOGY:   Non-plain film images such as CT, Ultrasound and MRI are read by the radiologist. Plain radiographic images are visualized and preliminarily interpreted by the emergency physician with the below findings:    ***    Interpretation per the Radiologist below, if available at the time of this note:    No orders to display         ED BEDSIDE ULTRASOUND:   Performed by ED Physician - none    LABS:  Labs Reviewed - No data to display    All other labs were within normal range or not returned as of this dictation. EMERGENCY DEPARTMENT COURSE and DIFFERENTIAL DIAGNOSIS/MDM:   Vitals:    Vitals:    02/09/23 0332   BP: 121/64   Pulse: (!) 118   Resp: 18   Temp: 99 °F (37.2 °C)   TempSrc: Oral   SpO2: 96%       ***    MDM        REASSESSMENT          CRITICAL CARE TIME   Total Critical Care time was *** minutes, excluding separately reportable procedures. There was a high probability of clinically significant/life threatening deterioration in the patient's condition which required my urgent intervention. ***    CONSULTS:  None    PROCEDURES:  Unless otherwise noted below, none     Procedures    No LOS Charge filed ***    FINAL IMPRESSION    No diagnosis found. DISPOSITION/PLAN   DISPOSITION        PATIENT REFERRED TO:  No follow-up provider specified.     DISCHARGE MEDICATIONS:  New Prescriptions    No medications on file     Controlled Substances Monitoring:     No flowsheet data found.     (Please note that portions of this note were completed with a voice recognition program.  Efforts were made to edit the dictations but occasionally words are mis-transcribed.)    Arias Carver DO (electronically signed)  Attending Emergency Physician

## 2023-02-09 NOTE — ED PROVIDER NOTES
[facility] ED  EMERGENCY DEPARTMENT ENCOUNTER      Pt Name: Xenia Essex  MRN: [id number]  [place] 1995  Date of evaluation: 2/9/2023  Provider: Lashon Schultz [facility]       Chief Complaint   Patient presents with    Hiccups    Cough     Blood in sputum           HISTORY OF PRESENT ILLNESS   (Location/Symptom, Timing/Onset, Context/Setting, Quality, Duration, Modifying Factors, Severity)  Note limiting factors. Xenia Essex is a 32 y.o. male who presents to the emergency department with parents who are the primary historians as the patient has Down syndrome. Parent states that 3 days ago they took the patient to urgent care because of cough and cold and congestion and fever and they tested him negative for influenza as well as COVID-19. They recommended supportive care at the time. The following day he started having some diarrhea. Today the parent states that he has started coughing up some darker colored blood. They state that couple days ago he did have some blood in his snot which was green-colored. They tell me that they have been trying to wipe his nose which has been triggering some bleeding as well. Today they also noticed a couple small blisters in the patient's throat so they wanted him to be checked out. They have been giving him Tylenol with his last dose being around 10 PM last night. He has otherwise been eating and drinking normally. They deny any previous history of bronchitis or pneumonia. No other known sick exposures. REVIEW OF SYSTEMS    (2-9 systems for level 4, 10 or more for level 5)     Review of Systems   Constitutional:  Positive for fever. Negative for fatigue. HENT:  Negative for congestion and sore throat. Eyes:  Negative for visual disturbance. Respiratory:  Positive for cough. Negative for shortness of breath. Cardiovascular:  Negative for chest pain and palpitations. Gastrointestinal:  Negative for abdominal distention. Genitourinary:  Negative for dysuria. Musculoskeletal:  Negative for back pain. Skin:  Negative for rash. Psychiatric/Behavioral:  Negative for suicidal ideas. Except as noted above the remainder of the review of systems was reviewed and negative. PAST MEDICAL HISTORY     Past Medical History:   Diagnosis Date    Allergic rhinitis     Anxiety     MR (mental retardation)     Sleep difficulties          SURGICAL HISTORY     No past surgical history on file. CURRENT MEDICATIONS       Previous Medications    CLONIDINE (CATAPRES) 0.2 MG TABLET    Take 1 tablet by mouth 2 times daily    DOCUSATE SODIUM (COLACE) 100 MG CAPSULE    Take 1 capsule by mouth daily as needed for Constipation. ERYTHROMYCIN WITH ETHANOL (EMGEL) 2 % GEL    Apply topically daily.     FLUTICASONE (FLONASE) 50 MCG/ACT NASAL SPRAY    1 spray by Nasal route daily    LORATADINE (CLARITIN) 10 MG TABLET    Take 1 tablet by mouth daily    POLYETHYLENE GLYCOL 3350 (MIRALAX PO)    Take by mouth as needed    PREDNISONE (DELTASONE) 50 MG TABLET    Take 1 tablet by mouth daily       ALLERGIES     Bactrim [sulfamethoxazole-trimethoprim]    FAMILY HISTORY       Family History   Problem Relation Age of Onset    Coronary Art Dis Father     High Blood Pressure Father     High Cholesterol Father     Cancer Paternal Grandfather         colon          SOCIAL HISTORY       Social History     Socioeconomic History    Marital status: Single   Tobacco Use    Smoking status: Never   Substance and Sexual Activity    Alcohol use: No    Drug use: No    Sexual activity: Never       SCREENINGS        [place] Coma Scale  Eye Opening: Spontaneous  Best Verbal Response: Oriented  Best Motor Response: Obeys commands  Rosie Coma Scale Score: 15               PHYSICAL EXAM    (up to 7 for level 4, 8 or more for level 5)     ED Triage Vitals [02/09/23 0332]   BP Temp Temp Source Heart Rate Resp SpO2 Height Weight   121/64 99 °F (37.2 °C) Oral (!) 118 18 96 % -- --       Physical Exam  Constitutional:       General: He is not in acute distress. Appearance: Normal appearance. He is not toxic-appearing. Comments: Patient is mostly nonverbal on exam due to underlying Down syndrome. He appears to be no distress on my exam.   HENT:      Head: Normocephalic and atraumatic. Mouth/Throat:      Mouth: Mucous membranes are moist.      Comments: Patient does have a couple small erythematous round blisters at the roof of his mouth. Otherwise her throat is clear with no exudates noticed in the tonsillar region. Oral mucosa is moist.  External nose exam shows some dark red spots indicative of raw skin versus older nosebleed. Eyes:      Extraocular Movements: Extraocular movements intact. Pupils: Pupils are equal, round, and reactive to light. Cardiovascular:      Rate and Rhythm: Normal rate and regular rhythm. Pulses: Normal pulses. Heart sounds: Normal heart sounds. Pulmonary:      Effort: Pulmonary effort is normal.      Breath sounds: Normal breath sounds. Abdominal:      General: Abdomen is flat. Bowel sounds are normal.      Palpations: Abdomen is soft. Musculoskeletal:         General: Normal range of motion. Skin:     General: Skin is warm and dry. Capillary Refill: Capillary refill takes less than 2 seconds. Neurological:      General: No focal deficit present. Mental Status: He is alert.    Psychiatric:         Mood and Affect: Mood normal.       DIAGNOSTIC RESULTS     EKG: All EKG's are interpreted by the Emergency Department Physician who either signs or Co-signs this chart in the absence of a cardiologist.        RADIOLOGY:   Non-plain film images such as CT, Ultrasound and MRI are read by the radiologist. Plain radiographic images are visualized and preliminarily interpreted by the emergency physician with the below findings:        Interpretation per the Radiologist below, if available at the time of this note:    No orders to display         ED BEDSIDE ULTRASOUND:   Performed by ED Physician - none    LABS:  Labs Reviewed - No data to display    All other labs were within normal range or not returned as of this dictation. EMERGENCY DEPARTMENT COURSE and DIFFERENTIAL DIAGNOSIS/MDM:   Vitals:    Vitals:    02/09/23 0332   BP: 121/64   Pulse: (!) 118   Resp: 18   Temp: 99 °F (37.2 °C)   TempSrc: Oral   SpO2: 96%         REASSESSMENT        Patient or parents that he likely has some kind of viral illness. The blood in his cough is likely coming from his nasal drainage. Recommend that they use a humidifier in his room and also continue using saline rinses to keep his nose moist.  They can also apply Vaseline. If symptoms do not improve by next week recommend following up with his family doctor. Return if symptoms get worse. They could also try Claritin or Sudafed to help with the congestion. They verbalized understanding and have no other questions or concerns. FINAL IMPRESSION      1. Viral illness          DISPOSITION/PLAN   DISPOSITION Decision To Discharge 02/09/2023 04:05:35 AM      PATIENT REFERRED TO:  DANNIE Rodriguez NP  [address]  [phone]    In 3 days      DISCHARGE MEDICATIONS:  New Prescriptions    No medications on file     Controlled Substances Monitoring:     No flowsheet data found.     (Please note that portions of this note were completed with a voice recognition program.  Efforts were made to edit the dictations but occasionally words are mis-transcribed.)    Bret Burleson DO (electronically signed)  Attending Emergency Physician           Bret Burleson DO  02/09/23 4676

## 2023-02-09 NOTE — ED TRIAGE NOTES
Pt ambulated to 10 with co cough with blood in sputum, hiccups.    Pt has noticeable nasal congestion.  Pt respirations are even and unlabored, pt is alert, bed is in the lowest position, call light is within reach.    Will continue to monitor.

## 2023-02-11 ENCOUNTER — APPOINTMENT (OUTPATIENT)
Dept: GENERAL RADIOLOGY | Age: 28
End: 2023-02-11
Payer: MEDICARE

## 2023-02-11 ENCOUNTER — HOSPITAL ENCOUNTER (EMERGENCY)
Age: 28
Discharge: HOME | End: 2023-02-11
Attending: EMERGENCY MEDICINE
Payer: MEDICARE

## 2023-02-11 ENCOUNTER — HOSPITAL ENCOUNTER (EMERGENCY)
Age: 28
Discharge: ANOTHER HOSPITAL | End: 2023-02-11
Attending: EMERGENCY MEDICINE
Payer: MEDICARE

## 2023-02-11 VITALS
TEMPERATURE: 101.5 F | DIASTOLIC BLOOD PRESSURE: 68 MMHG | OXYGEN SATURATION: 96 % | RESPIRATION RATE: 26 BRPM | SYSTOLIC BLOOD PRESSURE: 126 MMHG | HEART RATE: 103 BPM

## 2023-02-11 VITALS
DIASTOLIC BLOOD PRESSURE: 69 MMHG | OXYGEN SATURATION: 95 % | WEIGHT: 207 LBS | BODY MASS INDEX: 37.86 KG/M2 | RESPIRATION RATE: 18 BRPM | HEART RATE: 115 BPM | TEMPERATURE: 99.6 F | SYSTOLIC BLOOD PRESSURE: 110 MMHG

## 2023-02-11 DIAGNOSIS — J96.00 ACUTE RESPIRATORY FAILURE, UNSPECIFIED WHETHER WITH HYPOXIA OR HYPERCAPNIA (HCC): ICD-10-CM

## 2023-02-11 DIAGNOSIS — J05.0 CROUPY COUGH: ICD-10-CM

## 2023-02-11 DIAGNOSIS — I46.9 CARDIAC ARREST (HCC): Primary | ICD-10-CM

## 2023-02-11 DIAGNOSIS — J32.9 SINUSITIS, UNSPECIFIED CHRONICITY, UNSPECIFIED LOCATION: Primary | ICD-10-CM

## 2023-02-11 PROBLEM — R09.2 RESPIRATORY ARREST (HCC): Status: ACTIVE | Noted: 2023-01-01

## 2023-02-11 PROBLEM — T50.905A: Status: ACTIVE | Noted: 2023-01-01

## 2023-02-11 PROBLEM — J98.9 CARDIAC ARREST DUE TO RESPIRATORY DISORDER (HCC): Status: ACTIVE | Noted: 2023-01-01

## 2023-02-11 PROBLEM — I46.8 CARDIAC ARREST DUE TO RESPIRATORY DISORDER (HCC): Status: ACTIVE | Noted: 2023-02-11

## 2023-02-11 PROBLEM — T78.2XXA: Status: ACTIVE | Noted: 2023-01-01

## 2023-02-11 LAB
ABSOLUTE EOS #: 0 K/UL (ref 0–0.44)
ABSOLUTE IMMATURE GRANULOCYTE: 1.92 K/UL (ref 0–0.3)
ABSOLUTE LYMPH #: 2.16 K/UL (ref 1.1–3.7)
ABSOLUTE MONO #: 2.16 K/UL (ref 0.1–1.2)
ALBUMIN SERPL-MCNC: 3 G/DL (ref 3.5–5.2)
ALBUMIN/GLOBULIN RATIO: 0.8 (ref 1–2.5)
ALP SERPL-CCNC: 130 U/L (ref 40–129)
ALT SERPL-CCNC: 29 U/L (ref 5–41)
ANION GAP SERPL CALCULATED.3IONS-SCNC: 31 MMOL/L (ref 9–17)
AST SERPL-CCNC: 29 U/L
BACTERIA: ABNORMAL
BASOPHILS # BLD: 0 % (ref 0–2)
BASOPHILS ABSOLUTE: 0 K/UL (ref 0–0.2)
BILIRUB SERPL-MCNC: 0.6 MG/DL (ref 0.3–1.2)
BILIRUBIN URINE: ABNORMAL
BUN SERPL-MCNC: 13 MG/DL (ref 6–20)
BUN/CREAT BLD: 9 (ref 9–20)
CALCIUM SERPL-MCNC: 10 MG/DL (ref 8.6–10.4)
CASTS UA: ABNORMAL /LPF
CASTS UA: ABNORMAL /LPF
CHLORIDE SERPL-SCNC: 92 MMOL/L (ref 98–107)
CHP ED QC CHECK: NORMAL
CO2 SERPL-SCNC: 17 MMOL/L (ref 20–31)
COLOR: YELLOW
CREAT SERPL-MCNC: 1.42 MG/DL (ref 0.7–1.2)
EOSINOPHILS RELATIVE PERCENT: 0 % (ref 1–4)
EPITHELIAL CELLS UA: ABNORMAL /HPF (ref 0–5)
GFR SERPL CREATININE-BSD FRML MDRD: >60 ML/MIN/1.73M2
GLUCOSE BLD-MCNC: 158 MG/DL
GLUCOSE BLD-MCNC: 158 MG/DL (ref 74–100)
GLUCOSE SERPL-MCNC: 164 MG/DL (ref 70–99)
GLUCOSE UR STRIP.AUTO-MCNC: NEGATIVE MG/DL
HCT VFR BLD AUTO: 43.2 % (ref 40.7–50.3)
HGB BLD-MCNC: 13.4 G/DL (ref 13–17)
IMMATURE GRANULOCYTES: 8 %
KETONES UR STRIP.AUTO-MCNC: ABNORMAL MG/DL
LACTATE PLASV-SCNC: 23.9 MMOL/L (ref 0.5–2.2)
LEUKOCYTE ESTERASE UR QL STRIP.AUTO: NEGATIVE
LYMPHOCYTES # BLD: 9 % (ref 24–43)
MCH RBC QN AUTO: 32 PG (ref 25.2–33.5)
MCHC RBC AUTO-ENTMCNC: 31 G/DL (ref 28.4–34.8)
MCV RBC AUTO: 103.1 FL (ref 82.6–102.9)
MONOCYTES # BLD: 9 % (ref 3–12)
MORPHOLOGY: NORMAL
NITRITE UR QL STRIP.AUTO: NEGATIVE
NRBC AUTOMATED: 0.3 PER 100 WBC
NUCLEATED RED BLOOD CELLS: 2 PER 100 WBC (ref 0–5)
PDW BLD-RTO: 14.5 % (ref 11.8–14.4)
PLATELET # BLD AUTO: 392 K/UL (ref 138–453)
PMV BLD AUTO: 10 FL (ref 8.1–13.5)
POTASSIUM SERPL-SCNC: 4.9 MMOL/L (ref 3.7–5.3)
PROT SERPL-MCNC: 6.7 G/DL (ref 6.4–8.3)
PROT UR STRIP.AUTO-MCNC: 6 MG/DL (ref 5–9)
PROT UR STRIP.AUTO-MCNC: ABNORMAL MG/DL
RBC # BLD: 4.19 M/UL (ref 4.21–5.77)
RBC CLUMPS #/AREA URNS AUTO: ABNORMAL /HPF (ref 0–2)
SEG NEUTROPHILS: 74 % (ref 36–65)
SEGMENTED NEUTROPHILS ABSOLUTE COUNT: 17.76 K/UL (ref 1.5–8.1)
SODIUM SERPL-SCNC: 140 MMOL/L (ref 135–144)
SPECIFIC GRAVITY UA: >1.03 (ref 1.01–1.02)
TROPONIN I SERPL DL<=0.01 NG/ML-MCNC: 16 NG/L (ref 0–22)
TURBIDITY: CLEAR
URINE HGB: ABNORMAL
UROBILINOGEN, URINE: ABNORMAL
WBC # BLD AUTO: 24 K/UL (ref 3.5–11.3)
WBC UA: ABNORMAL /HPF (ref 0–5)

## 2023-02-11 PROCEDURE — 31500 INSERT EMERGENCY AIRWAY: CPT

## 2023-02-11 PROCEDURE — 80053 COMPREHEN METABOLIC PANEL: CPT

## 2023-02-11 PROCEDURE — 36415 COLL VENOUS BLD VENIPUNCTURE: CPT

## 2023-02-11 PROCEDURE — 92950 HEART/LUNG RESUSCITATION CPR: CPT

## 2023-02-11 PROCEDURE — 2580000003 HC RX 258: Performed by: EMERGENCY MEDICINE

## 2023-02-11 PROCEDURE — 81001 URINALYSIS AUTO W/SCOPE: CPT

## 2023-02-11 PROCEDURE — 99283 EMERGENCY DEPT VISIT LOW MDM: CPT

## 2023-02-11 PROCEDURE — 6370000000 HC RX 637 (ALT 250 FOR IP): Performed by: EMERGENCY MEDICINE

## 2023-02-11 PROCEDURE — 82947 ASSAY GLUCOSE BLOOD QUANT: CPT

## 2023-02-11 PROCEDURE — 85025 COMPLETE CBC W/AUTO DIFF WBC: CPT

## 2023-02-11 PROCEDURE — 71045 X-RAY EXAM CHEST 1 VIEW: CPT

## 2023-02-11 PROCEDURE — 6360000002 HC RX W HCPCS: Performed by: EMERGENCY MEDICINE

## 2023-02-11 PROCEDURE — 84484 ASSAY OF TROPONIN QUANT: CPT

## 2023-02-11 PROCEDURE — 96375 TX/PRO/DX INJ NEW DRUG ADDON: CPT

## 2023-02-11 PROCEDURE — 99285 EMERGENCY DEPT VISIT HI MDM: CPT

## 2023-02-11 PROCEDURE — 96365 THER/PROPH/DIAG IV INF INIT: CPT

## 2023-02-11 PROCEDURE — 96374 THER/PROPH/DIAG INJ IV PUSH: CPT

## 2023-02-11 PROCEDURE — 83605 ASSAY OF LACTIC ACID: CPT

## 2023-02-11 PROCEDURE — 87040 BLOOD CULTURE FOR BACTERIA: CPT

## 2023-02-11 RX ORDER — AMOXICILLIN 250 MG/5ML
500 POWDER, FOR SUSPENSION ORAL 3 TIMES DAILY
Qty: 300 ML | Refills: 0 | Status: ON HOLD
Start: 2023-02-11 | End: 2023-02-12 | Stop reason: SINTOL

## 2023-02-11 RX ORDER — ACETAMINOPHEN 650 MG/1
650 SUPPOSITORY RECTAL ONCE
Status: COMPLETED | OUTPATIENT
Start: 2023-02-11 | End: 2023-02-11

## 2023-02-11 RX ORDER — EPINEPHRINE 0.1 MG/ML
SYRINGE (ML) INJECTION DAILY PRN
Status: COMPLETED | OUTPATIENT
Start: 2023-02-11 | End: 2023-02-11

## 2023-02-11 RX ORDER — 0.9 % SODIUM CHLORIDE 0.9 %
INTRAVENOUS SOLUTION INTRAVENOUS CONTINUOUS PRN
Status: COMPLETED | OUTPATIENT
Start: 2023-02-11 | End: 2023-02-11

## 2023-02-11 RX ORDER — ATORVASTATIN CALCIUM 20 MG/1
20 TABLET, FILM COATED ORAL DAILY
COMMUNITY

## 2023-02-11 RX ORDER — DEXAMETHASONE SODIUM PHOSPHATE 10 MG/ML
6 INJECTION INTRAMUSCULAR; INTRAVENOUS ONCE
Status: COMPLETED | OUTPATIENT
Start: 2023-02-11 | End: 2023-02-11

## 2023-02-11 RX ORDER — AMIODARONE HYDROCHLORIDE 50 MG/ML
INJECTION, SOLUTION INTRAVENOUS DAILY PRN
Status: COMPLETED | OUTPATIENT
Start: 2023-02-11 | End: 2023-02-11

## 2023-02-11 RX ORDER — MIDAZOLAM HYDROCHLORIDE 2 MG/2ML
2 INJECTION, SOLUTION INTRAMUSCULAR; INTRAVENOUS ONCE
Status: COMPLETED | OUTPATIENT
Start: 2023-02-11 | End: 2023-02-11

## 2023-02-11 RX ORDER — FENTANYL CITRATE 50 UG/ML
25 INJECTION, SOLUTION INTRAMUSCULAR; INTRAVENOUS ONCE
Status: COMPLETED | OUTPATIENT
Start: 2023-02-11 | End: 2023-02-11

## 2023-02-11 RX ADMIN — SODIUM CHLORIDE 1000 ML: 9 INJECTION, SOLUTION INTRAVENOUS at 18:06

## 2023-02-11 RX ADMIN — AMIODARONE HYDROCHLORIDE 150 MG: 50 INJECTION, SOLUTION INTRAVENOUS at 18:10

## 2023-02-11 RX ADMIN — FENTANYL CITRATE 25 MCG: 50 INJECTION, SOLUTION INTRAMUSCULAR; INTRAVENOUS at 19:31

## 2023-02-11 RX ADMIN — EPINEPHRINE 1 MG: 0.1 INJECTION INTRACARDIAC; INTRAVENOUS at 18:15

## 2023-02-11 RX ADMIN — SODIUM CHLORIDE 1000 ML: 9 INJECTION, SOLUTION INTRAVENOUS at 18:05

## 2023-02-11 RX ADMIN — DEXAMETHASONE SODIUM PHOSPHATE 6 MG: 10 INJECTION INTRAMUSCULAR; INTRAVENOUS at 13:15

## 2023-02-11 RX ADMIN — ACETAMINOPHEN 650 MG: 650 SUPPOSITORY RECTAL at 18:41

## 2023-02-11 RX ADMIN — MIDAZOLAM HYDROCHLORIDE 2 MG: 1 INJECTION, SOLUTION INTRAMUSCULAR; INTRAVENOUS at 19:30

## 2023-02-11 RX ADMIN — EPINEPHRINE 0.01 MCG/KG/MIN: 1 INJECTION PARENTERAL at 18:50

## 2023-02-11 RX ADMIN — EPINEPHRINE 1 MG: 0.1 INJECTION INTRACARDIAC; INTRAVENOUS at 18:02

## 2023-02-11 ASSESSMENT — PULMONARY FUNCTION TESTS: PIF_VALUE: 24

## 2023-02-11 NOTE — ED PROVIDER NOTES
[facility] ED  EMERGENCY DEPARTMENT ENCOUNTER      Pt Name: Sofía El  MRN: [id number]  [place] 1995  Date of evaluation: 2/11/2023  Provider: Laureen Landau, MD    [address]       Chief Complaint   Patient presents with    Cardiac Arrest         HISTORY OF PRESENT ILLNESS   (Location/Symptom, Timing/Onset, Context/Setting, Quality, Duration, Modifying Factors, Severity)  Note limiting factors. Sofía El is a 32 y.o. male who presents to the emergency department      25-year-old male with a history of Down syndrome brought to the emergency department by EMS. Bagged ventilations and compressions performed on arrival.  She is obtained initially from EMS. They state they were called to the residence for difficulty breathing and altered mental status. The patient did appear cyanotic to have a pulse. During transport valve ventilations were initiated. Patient did lose pulse and compressions were started. On arrival the patient was pale mottled unresponsive. Patient was pulseless with no spontaneous respirations. Chest compressions were continued. Patient had been discharged shortly before from the emergency department with sinusitis and a mild croupy cough. Family members had not yet arrived. Patient received Decadron in the emergency department and discharged home with a prescription for amoxicillin. Family members did arrive. Spoke with family members after the patient was intubated. States that the patient did seem ill when he was at home. He was acting very fussy. He did need assistance using the bathroom does happen on occasion. He did seem to be very fussy and irritable and family members did give him the amoxicillin medication. Shortly following administration of amoxicillin the patient began turning blue and having difficulty breathing per family members who are present. 9 1 was then notified. Nursing Notes were reviewed.     REVIEW OF SYSTEMS    (2-9 systems for level 4, 10 or more for level 5)     Review of Systems   Reason unable to perform ROS: Patient unresponsive. Except as noted above the remainder of the review of systems was reviewed and negative. PAST MEDICAL HISTORY     Past Medical History:   Diagnosis Date    Allergic rhinitis     Anxiety     MR (mental retardation)     Sleep difficulties          SURGICAL HISTORY     No past surgical history on file. CURRENT MEDICATIONS       Discharge Medication List as of 2/11/2023  8:00 PM        CONTINUE these medications which have NOT CHANGED    Details   atorvastatin (LIPITOR) 20 MG tablet Take 20 mg by mouth dailyHistorical Med      amoxicillin (AMOXIL) 250 MG/5ML suspension Take 10 mLs by mouth 3 times daily for 10 days, Disp-300 mL, R-0Normal      Polyethylene Glycol 3350 (MIRALAX PO) Take by mouth as needed      cloNIDine (CATAPRES) 0.2 MG tablet Take 1 tablet by mouth 2 times daily, Disp-180 tablet, R-3      loratadine (CLARITIN) 10 MG tablet Take 1 tablet by mouth daily, Disp-90 tablet, R-3      fluticasone (FLONASE) 50 MCG/ACT nasal spray 1 spray by Nasal route daily, Disp-1 Bottle, R-3      erythromycin with ethanol (EMGEL) 2 % gel Apply topically daily. , Disp-1 Tube, R-3, Normal      docusate sodium (COLACE) 100 MG capsule Take 1 capsule by mouth daily as needed for Constipation. , Disp-60 capsule, R-1             ALLERGIES     Amoxicillin, Bactrim [sulfamethoxazole-trimethoprim], and Clindamycin/lincomycin    FAMILY HISTORY       Family History   Problem Relation Age of Onset    Coronary Art Dis Father     High Blood Pressure Father     High Cholesterol Father     Cancer Paternal Grandfather         colon          SOCIAL HISTORY       Social History     Socioeconomic History    Marital status: Single   Tobacco Use    Smoking status: Never   Substance and Sexual Activity    Alcohol use: No    Drug use: No    Sexual activity: Never       SCREENINGS                        PHYSICAL EXAM    (up to 7 for level 4, 8 or more for level 5)     ED Triage Vitals   BP Temp Temp Source Heart Rate Resp SpO2 Height Weight   02/11/23 1837 02/11/23 1837 02/11/23 1837 02/11/23 1830 02/11/23 1830 02/11/23 1830 -- --   (!) 56/23 (!) 101.4 °F (38.6 °C) Tympanic (!) 112 26 94 %         Physical Exam  Constitutional:       Comments: On arrival CPR was in progress. Patient was pale and mottled appearing. HENT:      Head: Normocephalic and atraumatic. Nose: Congestion present. Neck:      Comments: n  Cardiovascular:      Comments: No pulse. CPR in progress. Pulmonary:      Comments: Bilateral breath sounds noted with bagging. Good air exchange with bagging. Abdominal:      General: There is no distension. Skin:     Comments: Cool and mottled   Neurological:      Comments: Unresponsive         DIAGNOSTIC RESULTS     EKG: All EKG's are interpreted by the Emergency Department Physician who either signs or Co-signs this chart in the absence of a cardiologist.        RADIOLOGY:   Non-plain film images such as CT, Ultrasound and MRI are read by the radiologist. Plain radiographic images are visualized and preliminarily interpreted by the emergency physician with the below findings:        Interpretation per the Radiologist below, if available at the time of this note:    XR CHEST PORTABLE   Final Result   Endotracheal tube below just above the mika. Nasogastric tube in the   stomach. Perihilar congestion and mild interstitial edema.                ED BEDSIDE ULTRASOUND:   Performed by ED Physician - none    LABS:  Labs Reviewed   COMPREHENSIVE METABOLIC PANEL W/ REFLEX TO MG FOR LOW K - Abnormal; Notable for the following components:       Result Value    Glucose 164 (*)     Creatinine 1.42 (*)     Chloride 92 (*)     CO2 17 (*)     Anion Gap 31 (*)     Alkaline Phosphatase 130 (*)     Albumin 3.0 (*)     Albumin/Globulin Ratio 0.8 (*)     All other components within normal limits   CBC WITH AUTO DIFFERENTIAL - Abnormal; Notable for the following components:    WBC 24.0 (*)     RBC 4.19 (*)     .1 (*)     RDW 14.5 (*)     NRBC Automated 0.3 (*)     Seg Neutrophils 74 (*)     Lymphocytes 9 (*)     Eosinophils % 0 (*)     Immature Granulocytes 8 (*)     Segs Absolute 17.76 (*)     Absolute Mono # 2.16 (*)     Absolute Immature Granulocyte 1.92 (*)     All other components within normal limits   URINALYSIS WITH MICROSCOPIC - Abnormal; Notable for the following components:    Bilirubin Urine MODERATE (*)     Ketones, Urine 4+ (*)     Specific Gravity, UA >1.030 (*)     Urine Hgb 1+ (*)     Protein, UA 2+ (*)     Urobilinogen, Urine ELEVATED (*)     Bacteria, UA 3+ (*)     All other components within normal limits   LACTIC ACID - Abnormal; Notable for the following components:    Lactic Acid 23.9 (*)     All other components within normal limits   GLUCOSE, WHOLE BLOOD - Abnormal; Notable for the following components:    POC Glucose 158 (*)     All other components within normal limits   POCT GLUCOSE - Normal   CULTURE, BLOOD 1   TROPONIN       All other labs were within normal range or not returned as of this dictation. EMERGENCY DEPARTMENT COURSE and DIFFERENTIAL DIAGNOSIS/MDM:   Vitals:    Vitals:    02/11/23 1915 02/11/23 1921 02/11/23 1927 02/11/23 1930   BP: (!) 122/54 (!) 121/54 (!) 124/56 126/68   Pulse: (!) 110 (!) 107 (!) 105 (!) 103   Resp: 26 26 23 26   Temp:       TempSrc:       SpO2: 97% 97% 96% 96%           MDM  Number of Diagnoses or Management Options  Acute respiratory failure, unspecified whether with hypoxia or hypercapnia (HCC)  Cardiac arrest ([facility] [address]  Diagnosis management comments: 75-year-old male acute respiratory and cardiac arrest.  Epinephrine was given x 2. Patient did have spontaneous return of circulation. Initial heart rate was 210 narrow complex. Patient very thready pulse was hypotensive. Amiodarone 150 mg was given. Patient's heart rate decreased to 100 bpm sinus rhythm.   Blood pressure was measured and patient was hypotensive systolic blood pressure in the 50s. Patient was still requiring bag ventilations. Patient was intubated by PA-C 7.5 with a 7.5 tube at 25 cm. Intial color change and bilateral breath sounds. O2 saturation began to drop to low 80's. Patient bagging without resistance. Abdomen becoming mildly distended. Tube removed and patient intubated by me with 7.5 tube. Bagging easily, SaO2 increased to 90's. Patient remained hypotensive. 2 litersof IF fluids being given. Epi gtt initiated. Continued IV fluids. BP improving. Os sats remain in the high 90's. Color improved. Patient still unresponsive. Family updated on condition. Patient accepted for transfer to [address] [place] [address] ED Dr Tremaine Shahid. MIPS       REASSESSMENT          CRITICAL CARE TIME   Total Critical Care time was 40 minutes, excluding separately reportable procedures. There was a high probability of clinically significant/life threatening deterioration in the patient's condition which required my urgent intervention. CONSULTS:  None    PROCEDURES:  Unless otherwise noted below, none     Procedures        FINAL IMPRESSION      1. Cardiac arrest ([address]    2. Acute respiratory failure, unspecified whether with hypoxia or hypercapnia [facility])          DISPOSITION/PLAN   DISPOSITION Decision To Transfer 02/11/2023 06:34:38 PM      PATIENT REFERRED TO:  No follow-up provider specified. DISCHARGE MEDICATIONS:  Discharge Medication List as of 2/11/2023  8:00 PM        Controlled Substances Monitoring:     No flowsheet data found.     (Please note that portions of this note were completed with a voice recognition program.  Efforts were made to edit the dictations but occasionally words are mis-transcribed.)    David Dash MD (electronically signed)  Attending Emergency Physician             David Dash MD  02/11/23 2001

## 2023-02-11 NOTE — ED TRIAGE NOTES
BIB EMS, CPR in progress on arrival. Seen earlier today for cold-like symptoms, d/c'd on Amoxicillin. Hx Down's syndrome.
no

## 2023-02-11 NOTE — DISCHARGE INSTRUCTIONS
Amoxicillin as directed until complete. Tylenol as needed for pain. Cool mist treatments at home, may try vics if it seems to help. Use antibiotic ointment twice daily to nose and dry skin. Follow up with primary care provider for recheck.   Seek medical attention immediately for any worsening symptoms or any other acute concerns

## 2023-02-11 NOTE — ED PROVIDER NOTES
[facility]  EMERGENCY DEPARTMENT ENCOUNTER      Pt Name: Az Mills  MRN: [id number]  [place] 1995  Date of evaluation: 2/11/2023  Provider: Tasha Pete MD    CHIEF COMPLAINT       Chief Complaint   Patient presents with    Cough     Cough, fever, congestion for 7 days. Per mom states was at urgent care and diagnosed with viral infection. HISTORY OF PRESENT ILLNESS   (Location/Symptom, Timing/Onset, Context/Setting, Quality, Duration, Modifying Factors, Severity)  Note limiting factors. Az Mills is a 32 y.o. male who presents to the emergency department      29-year-old male brought to the emergency department by family member for evaluation of cough and nasal congestion. Symptoms have been going on for 1 week. Patient was seen in urgent care earlier this week and diagnosed with a viral syndrome. They have been using Mucinex at home. Patient does not seem to be getting any better. Taking fluids well but not been eating as much as usual.          Nursing Notes were reviewed. REVIEW OF SYSTEMS    (2-9 systems for level 4, 10 or more for level 5)     Review of Systems   Reason unable to perform ROS: mrdd. Except as noted above the remainder of the review of systems was reviewed and negative. PAST MEDICAL HISTORY     Past Medical History:   Diagnosis Date    Allergic rhinitis     Anxiety     MR (mental retardation)     Sleep difficulties          SURGICAL HISTORY     No past surgical history on file.       CURRENT MEDICATIONS       Discharge Medication List as of 2/11/2023  1:59 PM        CONTINUE these medications which have NOT CHANGED    Details   atorvastatin (LIPITOR) 20 MG tablet Take 20 mg by mouth dailyHistorical Med      Polyethylene Glycol 3350 (MIRALAX PO) Take by mouth as needed      cloNIDine (CATAPRES) 0.2 MG tablet Take 1 tablet by mouth 2 times daily, Disp-180 tablet, R-3      loratadine (CLARITIN) 10 MG tablet Take 1 tablet by mouth daily, Disp-90 tablet, R-3      fluticasone (FLONASE) 50 MCG/ACT nasal spray 1 spray by Nasal route daily, Disp-1 Bottle, R-3      erythromycin with ethanol (EMGEL) 2 % gel Apply topically daily. , Disp-1 Tube, R-3, Normal      docusate sodium (COLACE) 100 MG capsule Take 1 capsule by mouth daily as needed for Constipation. , Disp-60 capsule, R-1             ALLERGIES     Amoxicillin, Bactrim [sulfamethoxazole-trimethoprim], and Clindamycin/lincomycin    FAMILY HISTORY       Family History   Problem Relation Age of Onset    Coronary Art Dis Father     High Blood Pressure Father     High Cholesterol Father     Cancer Paternal Grandfather         colon          SOCIAL HISTORY       Social History     Socioeconomic History    Marital status: Single   Tobacco Use    Smoking status: Never   Substance and Sexual Activity    Alcohol use: No    Drug use: No    Sexual activity: Never       SCREENINGS        Gerry Coma Scale  Eye Opening: Spontaneous (pt MRDD)               PHYSICAL EXAM    (up to 7 for level 4, 8 or more for level 5)     ED Triage Vitals   BP Temp Temp Source Heart Rate Resp SpO2 Height Weight   02/11/23 1224 02/11/23 1218 02/11/23 1218 02/11/23 1224 02/11/23 1224 02/11/23 1224 -- 02/11/23 1224   110/69 99.6 °F (37.6 °C) Tympanic 99 18 95 %  207 lb (93.9 kg)       Physical Exam  Vitals and nursing note reviewed. Constitutional:       Comments: 23-year-old patient with Down syndrome. Mental functioning per family. Afebrile and nontoxic-appearing. Nasal congestion, croup cough without stridor or respiratory distress   HENT:      Ears:      Comments: Bilateral tympanic membranes are partially obscured with cerumen. What was visible of the tympanic membranes was clear without effusion or erythema     Nose: Congestion and rhinorrhea present. Comments: Yellow drainage     Mouth/Throat:      Comments: No drooling. Pain with swallowing  Cardiovascular:      Comments: Heart rate 115 bpm.  Sinus tachycardia.   Pulmonary: Comments: No stridor. No wheezing. No rales. No rhonchi. No increased work of breathing. Respirations are nonlabored. SaO2 is 95% on room air  Abdominal:      Palpations: Abdomen is soft. Tenderness: There is no abdominal tenderness. Skin:     General: Skin is warm and dry. Neurological:      General: No focal deficit present. Mental Status: He is oriented to person, place, and time. DIAGNOSTIC RESULTS     EKG: All EKG's are interpreted by the Emergency Department Physician who either signs or Co-signs this chart in the absence of a cardiologist.        RADIOLOGY:   Non-plain film images such as CT, Ultrasound and MRI are read by the radiologist. Plain radiographic images are visualized and preliminarily interpreted by the emergency physician with the below findings:        Interpretation per the Radiologist below, if available at the time of this note:    XR CHEST PORTABLE   Final Result   Central vascularity is top normal.  Otherwise unremarkable chest.               ED BEDSIDE ULTRASOUND:   Performed by ED Physician - none    LABS:  Labs Reviewed - No data to display    All other labs were within normal range or not returned as of this dictation. EMERGENCY DEPARTMENT COURSE and DIFFERENTIAL DIAGNOSIS/MDM:   Vitals:    Vitals:    02/11/23 1218 02/11/23 1224 02/11/23 1408   BP:  110/69    Pulse:  99 (!) 115   Resp:  18 18   Temp: 99.6 °F (37.6 °C)     TempSrc: Tympanic     SpO2:  95% 95%   Weight:  207 lb (93.9 kg)            MDM  Number of Diagnoses or Management Options  Croupy cough  Sinusitis, unspecified chronicity, unspecified location  Diagnosis management comments: 47-year-old male MRDD history of Down syndrome. Significant nasal congestion. Patient has had a cough. No distress, no stridor, chest x-ray without significant abnormalities per radiology read. Patient did have significant amount of nasal congestion and drainage. Decadron was given for croupy sounding cough. Patient again no drooling no stridor. Patient not been started on antibiotics was given a prescription for amoxicillin. Family members did feel liquid be been more suited for him. Also discussed using cool mist at home for his symptoms as well. Stable for discharge home. Patient was discharged with family. ED return and follow-up discussed prior to discharge. MIPS     REASSESSMENT          CRITICAL CARE TIME   Total Critical Care time was  minutes, excluding separately reportable procedures. There was a high probability of clinically significant/life threatening deterioration in the patient's condition which required my urgent intervention. CONSULTS:  None    PROCEDURES:  Unless otherwise noted below, none     Procedures        FINAL IMPRESSION      1. Sinusitis, unspecified chronicity, unspecified location    2. Croupy cough          DISPOSITION/PLAN   DISPOSITION Decision To Discharge 02/11/2023 01:51:55 PM      PATIENT REFERRED TO:  DANNIE Kahn NP  [address]  [phone]      2-3 days for recheck    DISCHARGE MEDICATIONS:  Discharge Medication List as of 2/11/2023  1:59 PM        START taking these medications    Details   amoxicillin (AMOXIL) 250 MG/5ML suspension Take 10 mLs by mouth 3 times daily for 10 days, Disp-300 mL, R-0Normal           Controlled Substances Monitoring:     No flowsheet data found.     (Please note that portions of this note were completed with a voice recognition program.  Efforts were made to edit the dictations but occasionally words are mis-transcribed.)    Marilee Buitrago MD (electronically signed)  Attending Emergency Physician            Marilee Buitrago MD  02/11/23 [id number]

## 2023-02-12 PROBLEM — D72.825 BANDEMIA: Status: ACTIVE | Noted: 2023-02-12

## 2023-02-12 PROBLEM — J18.9 MULTIFOCAL PNEUMONIA: Status: ACTIVE | Noted: 2023-01-01

## 2023-02-12 PROBLEM — J96.02 ACUTE RESPIRATORY FAILURE WITH HYPOXIA AND HYPERCAPNIA (HCC): Status: ACTIVE | Noted: 2023-01-01

## 2023-02-12 PROBLEM — G93.40 ENCEPHALOPATHY ACUTE: Status: ACTIVE | Noted: 2023-01-01

## 2023-02-12 PROBLEM — J96.01 ACUTE RESPIRATORY FAILURE WITH HYPOXIA AND HYPERCAPNIA (HCC): Status: ACTIVE | Noted: 2023-01-01

## 2023-02-12 PROBLEM — E87.20 LACTIC ACIDOSIS: Status: ACTIVE | Noted: 2023-01-01

## 2023-02-12 PROBLEM — J69.0 ASPIRATION PNEUMONIA OF BOTH LUNGS (HCC): Status: ACTIVE | Noted: 2023-01-01

## 2023-02-12 PROBLEM — N17.9 AKI (ACUTE KIDNEY INJURY) (HCC): Status: ACTIVE | Noted: 2023-01-01

## 2023-02-12 LAB
MICROORGANISM SPEC CULT: NORMAL
SERVICE CMNT-IMP: NORMAL
SPECIMEN DESCRIPTION: NORMAL

## 2023-02-12 NOTE — ED NOTES
32year old with down syndrome    Respiratory arrest from anaphylactic reaction from antibiotics     intubated    Epi ggt       Angelique Regalado RN  02/11/23 2023

## 2023-02-12 NOTE — PLAN OF CARE
Problem: Discharge Planning  Goal: Discharge to home or other facility with appropriate resources  Outcome: Progressing  Flowsheets (Taken 2/11/2023 2150)  Discharge to home or other facility with appropriate resources:   Identify barriers to discharge with patient and caregiver   Arrange for needed discharge resources and transportation as appropriate   Identify discharge learning needs (meds, wound care, etc)     Problem: Pain  Goal: Verbalizes/displays adequate comfort level or baseline comfort level  Outcome: Progressing     Problem: Safety - Medical Restraint  Goal: Remains free of injury from restraints (Restraint for Interference with Medical Device)  Description: INTERVENTIONS:  1. Determine that other, less restrictive measures have been tried or would not be effective before applying the restraint  2. Evaluate the patient's condition at the time of restraint application  3. Inform patient/family regarding the reason for restraint  4.  Q2H: Monitor safety, psychosocial status, comfort, nutrition and hydration  Outcome: Progressing  Flowsheets  Taken 2/12/2023 0200  Remains free of injury from restraints (restraint for interference with medical device):   Determine that other, less restrictive measures have been tried or would not be effective before applying the restraint   Evaluate the patient's condition at the time of restraint application   Inform patient/family regarding the reason for restraint   Every 2 hours: Monitor safety, psychosocial status, comfort, nutrition and hydration  Taken 2/12/2023 0000  Remains free of injury from restraints (restraint for interference with medical device):   Determine that other, less restrictive measures have been tried or would not be effective before applying the restraint   Evaluate the patient's condition at the time of restraint application   Inform patient/family regarding the reason for restraint   Every 2 hours: Monitor safety, psychosocial status, comfort, nutrition and hydration  Taken 2/11/2023 2251  Remains free of injury from restraints (restraint for interference with medical device):   Determine that other, less restrictive measures have been tried or would not be effective before applying the restraint   Evaluate the patient's condition at the time of restraint application   Inform patient/family regarding the reason for restraint   Every 2 hours: Monitor safety, psychosocial status, comfort, nutrition and hydration     Problem: Confusion  Goal: Confusion, delirium, dementia, or psychosis is improved or at baseline  Description: INTERVENTIONS:  1. Assess for possible contributors to thought disturbance, including medications, impaired vision or hearing, underlying metabolic abnormalities, dehydration, psychiatric diagnoses, and notify attending LIP  2. [place] high risk fall precautions, as indicated  3. Provide frequent short contacts to provide reality reorientation, refocusing and direction  4. Decrease environmental stimuli, including noise as appropriate  5. Monitor and intervene to maintain adequate nutrition, hydration, elimination, sleep and activity  6. If unable to ensure safety without constant attention obtain sitter and review sitter guidelines with assigned personnel  7.  Initiate Psychosocial CNS and Spiritual Care consult, as indicated  Outcome: Progressing

## 2023-02-12 NOTE — PROCEDURES
PROCEDURE NOTE - CENTRAL VENOUS LINE PLACEMENT    PATIENT NAME: Elena Cisneros RECORD NO. [id number]  DATE: 2/12/2023  ATTENDING PHYSICIAN: Mandeep Palacios MD    PREOPERATIVE DIAGNOSIS:  Need for IV access  POSTOPERATIVE DIAGNOSIS:  Same  PROCEDURE PERFORMED:  Right Internal Jugular Vein Central Line Insertion  PERFORMING PHYSICIAN: Juli Mitchell MD  ANESTHESIA:  Local utilizing 1% lidocaine  ESTIMATED BLOOD LOSS:  Less than 25 ml  COMPLICATIONS:  None immediately appreciated. DISCUSSION:  Priti Araujo is a 32y.o.-year-old male who requires central IV access. The history and physical examination were reviewed and confirmed. Informed consent could not be taken because patient was intubated and family was not reachable. PROCEDURE:  A timeout was initiated by the bedside nurse and was confirmed by those present. The patient was placed in a supine position. The skin overlying the Right Internal Jugular Vein was prepped with chlorhexadine and draped in sterile fashion. The skin was infiltrated with local anesthetic. The vessel and surrounding anatomy was visualized using ultrasound. Through the anesthetized region, the introducer needle was inserted into the internal jugular vein returning dark red non pulsatile blood. A guidewire was placed through the center of the needle with no resistance. Ultrasound confirmed presence of wire in the vein. A small incision made in the skin with a #11 scalpel blade. The dilator was inserted into the skin and vein over guidewire using Seldinger technique. The dilator was then removed and the 7 F 20 cm catheter was placed in the vein over the guidewire using Seldinger technique. The guidewire was then removed and all ports aspirated and flushed appropriately. The catheter then secured using silk suture and a temporary sterile dressing was applied. No immediate complication was evident.   All sponge, instrument and needle counts were correct at the completion of the procedure. Postprocedural chest x-ray showed good position of the catheter with no evidence of hemothorax or pneumothorax. The patient tolerated the procedure well with no immediate complication evident.        Zayra Valenzuela MD  Internal Medicine Resident, PGY-2  [address]  6/64/7504,5:38 AM

## 2023-02-12 NOTE — PROGRESS NOTES
Comprehensive Nutrition Assessment    Type and Reason for Visit:  Initial    Nutrition Recommendations/Plan:   Monitor for start of nutrition. If TF, suggest Vital High Protein (Peptide based high protein ) at goal rate of 50ml/hr to provide 1200 kcal/d, 104gm protein     Malnutrition Assessment:  Malnutrition Status:  Insufficient data (02/12/23 1402)    Context:  Acute Illness     Findings of the 6 clinical characteristics of malnutrition:  Energy Intake:  Unable to assess  Weight Loss:  Unable to assess     Body Fat Loss:  Unable to assess     Muscle Mass Loss:  Unable to assess    Fluid Accumulation:  Unable to assess     Strength:  Not Performed    Nutrition Assessment:    Admitted post cardiac arrest secondary to resp failure. Currently intubated. Noted plan for LTME, absent cough/gag. Nutrition Related Findings:    labs/meds reviewed Wound Type: None       Current Nutrition Intake & Therapies:    Average Meal Intake: NPO     Diet NPO    Anthropometric Measures:  Height: 5' 7.01\" (170.2 cm)  Ideal Body Weight (IBW): 148 lbs (67 kg)       Current Body Weight: 200 lb 13.4 oz (91.1 kg), 135.7 % IBW. Weight Source: Bed Scale  Current BMI (kg/m2): 31.4                          BMI Categories: Obese Class 1 (BMI 30.0-34. 9)    Estimated Daily Nutrient Needs:  Energy Requirements Based On: Kcal/kg  Weight Used for Energy Requirements: Current  Energy (kcal/day): 5984-0576 kcal/d  Weight Used for Protein Requirements: Ideal  Protein (g/day): 100-120gm pro/d  Method Used for Fluid Requirements: 1 ml/kcal  Fluid (ml/day): 1300ml/d    Nutrition Diagnosis:   Inadequate oral intake related to impaired respiratory function as evidenced by intubation, NPO or clear liquid status due to medical condition    Nutrition Interventions:   Food and/or Nutrient Delivery: Continue NPO  Nutrition Education/Counseling: No recommendation at this time  Coordination of Nutrition Care: Continue to monitor while inpatient Goals:  Previous Goal Met:  (goal set)  Goals: Meet at least 75% of estimated needs, prior to discharge       Nutrition Monitoring and Evaluation:   Behavioral-Environmental Outcomes: None Identified  Food/Nutrient Intake Outcomes: None Identified  Physical Signs/Symptoms Outcomes: Biochemical Data, Nutrition Focused Physical Findings, Weight, Skin, Fluid Status or Edema    Discharge Planning:     Too soon to determine     Abimbola Gutierrez RD, LD  Contact: [phone]

## 2023-02-12 NOTE — CARE COORDINATION
Transitional planning:  Went to pt's room, no family present. Pt intubated, on soft wrist restraints. Settings: FiO2 30%, PEEP 5, RR 21.

## 2023-02-12 NOTE — PROCEDURES
Insert Arterial Line  Date/Time:  02/11/23, 11:53 PM  Performed by: Yasmine Shrestha RCP    Patient identity confirmed: arm band and provided demographic data   Time out: Immediately prior to procedure a \"time out\" was called to verify the correct patient, procedure, equipment, support staff. Preparation: Patient was prepped and draped in the usual sterile fashion.     Location:left radial    Yogesh's test normal: yes  Needle gauge: 20     Number of attempts: 1  Post-procedure: transparent dressing applied and line secured    Patient tolerance: well

## 2023-02-12 NOTE — PROGRESS NOTES
Patient admitted on Mechanical Ventilator Protocol. Patients height measured at 67in for an IBW 66.1    Patient placed on the ventilator on settings as charted on flowsheeet. Ventilator Bronchodilator assessment    Breath sounds: clear   Inspiratory Pressure: 24    Plateau Pressure: 17    Patient assessed at level 1          [x]    Bronchodilator Assessment    BRONCHODILATOR ASSESSMENT SCORE  Score 0 (Home) 1 2 3 4   Breath Sounds   []  Chronic Ventilator: Patient at baseline [x]  Mild Wheezes/ Clear []  Intermittent wheezes with good air entry []  Bilateral/unilateral wheezing with diminished air entry []  Insp/Exp wheeze and/or poor aeration   Ventilator Pressures   []  Chronic Ventilator [x]  Insp. Pressure less than 25 cm H20 []  Insp. Pressure less than 25 cm H20 []  Insp. Pressure exceeds 25 cm H20 []  Insp.  Pressure exceeds 30 cm H20   Plateau Pressure []  NA   []  Plateau Pressure less than 4  []  Plateau Pressure less than or equal to 5 []  Plateau Pressure greater than or equal to 6 [x]  Plateau Pressure greater than or equal to 8       MICK Khoury  6:55 AM

## 2023-02-12 NOTE — PROGRESS NOTES
INTENSIVE CARE UNIT  Resident Physician Progress Note    Patient - Jennifer Freire  Date of Admission -  2/11/2023  8:18 PM  Date of Evaluation -  2/12/2023  Room and Bed Number -  3024/3024-01   Hospital Day - 1      SUBJECTIVE:     OVERNIGHT EVENTS: CVC placed, UOP increased, pupils sluggish, no gag or corneal, consult to cards and [address] [place] [address],[address]:   Jennifer Freire is a 32 y.o. with PMH of   -Down syndrome     Patient was brought in to the emergency department of [facility] by EMS. Per EMS report, they were called to the residence for difficulty breathing and altered mental status. Patient was found cyanotic and bag ventilation was started. He lost pulse and compression were started on his way to the ER. On arrival to the emergency department he was found pale, unresponsive and was in respiratory arrest.  CPR was done for 10 minutes and ROSC was achieved. Patient went into unstable narrow complex tachycardia with blood pressure in 50s.  150 mg amiodarone was given and patient was subsequently intubated. He also received epi x3 and was started on epi drip. Of note, he was recently discharged from the emergency department with sinusitis treated with Decadron and sent on amoxicillin. Per family conversation soon after taking his amoxicillin he turns blue and was noted to have difficulty breathing. 911 was then notified. Upon admission, pt was on ventilator AC/VC RR 26//PEEP 5 FiO2 100% sedated with propofol 15 mcg and on epi drip. Admission VBG showed 7.1 1/57/44/17. Blood pressure 115/67, MAP 80, heart rate 85. Labs showed: Anion gap metabolic acidosis bicarbonate 17, anion gap 31. BUN 13, creatinine 1.42. Troponin 16->> 137. proBNP 921. WBC 35.4, hemoglobin 13.9. Platelet 587. CT PE is done in the emergency department. EKG showed normal sinus rhythm. No acute ST-T wave changes. Pt is transferred to the MICU [facility] for further management.       REVIEW OF SYSTEMS:  Review of SystemsIntubated, sedated       OBJECTIVE:     VITAL SIGNS:  /82   Pulse (!) 101   Temp (!) 102 °F (38.9 °C) (Bladder) Comment: cooling blanket continued, ice packs applied  Resp 24   Ht 5' 7\" (1.702 m)   Wt 200 lb 13.4 oz (91.1 kg)   SpO2 95%   BMI 31.46 kg/m²   Tmax over 24 hours:  Temp (24hrs), Av °F (38.9 °C), Min:99.6 °F (37.6 °C), Max:104.4 °F (40.2 °C)      Patient Vitals for the past 8 hrs:   BP Temp Temp src Pulse Resp SpO2   23 0730 113/82 -- -- (!) 101 24 95 %   23 0715 -- -- -- (!) 106 21 96 %   23 0700 (!) 133/97 (!) 102 °F (38.9 °C) Bladder (!) 109 21 96 %   23 0630 (!) 130/94 -- -- (!) 113 21 97 %   23 0600 (!) 137/95 (!) 102.4 °F (39.1 °C) Bladder (!) 107 21 96 %   23 0530 (!) 137/91 -- -- (!) 106 21 95 %   23 0500 (!) 138/93 -- -- (!) 106 19 96 %   23 0430 (!) 138/93 -- -- (!) 108 21 96 %   23 0400 (!) 138/93 (!) 103.8 °F (39.9 °C) Bladder (!) 107 19 99 %   23 0354 -- -- -- (!) 106 21 99 %   23 0330 (!) 134/91 -- -- (!) 111 21 95 %   23 0300 123/85 -- -- (!) 108 21 --   23 0230 127/87 -- -- (!) 107 19 95 %   23 0200 122/85 (!) 104.4 °F (40.2 °C) Bladder (!) 110 20 95 %   23 0130 120/78 -- -- (!) 109 21 96 %   23 0100 112/79 -- -- (!) 110 21 96 %   23 0030 110/76 (!) 103.8 °F (39.9 °C) -- (!) 108 20 95 %         Intake/Output Summary (Last 24 hours) at 2023 0809  Last data filed at 2023 0800  Gross per 24 hour   Intake 2652.89 ml   Output 805 ml   Net 1847.89 ml     Date 23 0000 - 23 2359   Shift 7980-0262 2751-9889 9751-8357 24 Hour Total   INTAKE   I.V.(mL/kg) 1057. 5(11.6)   1057. 5(11.6)   IV Piggyback(mL/kg) 1595. 4(17.5)   1595. 4(17.5)   Shift Total(mL/kg) 2652. 9(29.1)   2652. 9(29.1)   OUTPUT   Urine(mL/kg/hr) 500(0.7) 150  650   Shift Total(mL/kg) 500(5.5) 150(1.6)  650(7.1)   Weight (kg) 91.1 91.1 91.1 91.1     Wt Readings from Last 3 Encounters:   02/11/23 200 lb 13.4 oz (91.1 kg)   02/11/23 207 lb (93.9 kg)   07/09/17 221 lb (100.2 kg)     Body mass index is 31.46 kg/m². PHYSICAL EXAM:  Physical Exam  Constitutional:       Comments: Intubated    Cardiovascular:      Rate and Rhythm: Regular rhythm. Tachycardia present. Pulses: Normal pulses. Pulmonary:      Comments: On vent, bl breath sounds present  Abdominal:      General: There is no distension. Tenderness: There is no abdominal tenderness. Skin:     Comments: Cool extremities    Neurological:      Comments: Pupils sluggish, no gag or corneal           MEDICATIONS:  Scheduled Meds:   levofloxacin  750 mg IntraVENous Q24H    methylPREDNISolone  40 mg IntraVENous Daily    metroNIDAZOLE  500 mg IntraVENous Q12H    diphenhydrAMINE  25 mg IntraVENous q8h    sodium chloride flush  5-40 mL IntraVENous 2 times per day    enoxaparin  40 mg SubCUTAneous Daily     Continuous Infusions:   norepinephrine Stopped (02/12/23 0139)    propofol Stopped (02/12/23 0704)    sodium bicarbonate infusion 100 mL/hr at 02/12/23 0756    sodium chloride Stopped (02/12/23 0733)     PRN Meds:   fentanNYL, 50 mcg, Q1H PRN  sodium chloride flush, 5-40 mL, PRN  sodium chloride, , PRN  ondansetron, 4 mg, Q8H PRN   Or  ondansetron, 4 mg, Q6H PRN  polyethylene glycol, 17 g, Daily PRN  acetaminophen, 650 mg, Q6H PRN   Or  acetaminophen, 650 mg, Q6H PRN  potassium chloride, 20 mEq, PRN   Or  potassium chloride, 10 mEq, PRN  magnesium sulfate, 2,000 mg, PRN  sodium phosphate IVPB, 10 mmol, PRN   Or  sodium phosphate IVPB, 15 mmol, PRN   Or  sodium phosphate IVPB, 20 mmol, PRN        SUPPORT DEVICES: [x] Ventilator     VENT SETTINGS (Comprehensive) (if applicable):   PRVC mode, FiO2 30%, PEEP 8, Respiratory Rate 20, Tidal Volume 530  Vent Information  Ventilator ID: [id number]  Additional Respiratory Assessments  Heart Rate: (!) 101  Resp: 24  SpO2: 95 %  End Tidal CO2: 28 (%)  Humidification Source: [facility]    ABGs: Arterial Blood Gas result:  7.427, 29.0, 84.1, 19.1   Lab Results   Component Value Date/Time    FIO2 30.0 02/12/2023 04:47 AM         DATA:  Complete Blood Count:   Recent Labs     02/11/23  1602 02/11/23 2025 02/12/23  0041 02/12/23  0636   WBC 24.0* 39.4* 31.8* 28.2*   RBC 4.19* 4.48 4.64 4.62   HGB 13.4 13.9 14.3 14.2   HCT 43.2 42.4 41.6 40.9   .1* 94.6 89.7 88.5   MCH 32.0 31.0 30.8 30.7   MCHC 31.0 32.8 34.4 34.7   RDW 14.5* 14.6* 14.6* 14.6*    376 341 Platelet clumps present, count appears adequate.    MPV 10.0 10.2 10.0  --         Last 3 Blood Glucose:   Recent Labs     02/11/23 1602 02/11/23 1812 02/12/23 0152 02/12/23  0636   GLUCOSE 164* 158 208* 202*        PT/INR:  No results found for: PROTIME, INR  PTT:  No results found for: APTT, PTT    Comprehensive Metabolic Profile:   Recent Labs     02/11/23 1602 02/11/23 1812 02/11/23 2041 02/12/23  0152 02/12/23  0636     --   --  133* 137   K 4.9  --   --  4.5 4.0   CL 92*  --   --  99 101   CO2 17*  --   --  17* 16*   BUN 13  --   --  24* 29*   CREATININE 1.42*  --  2.13* 1.92* 2.03*   GLUCOSE 164* 158  --  208* 202*   CALCIUM 10.0  --   --  7.0* 6.9*   PROT 6.7  --   --  5.9*  --    LABALBU 3.0*  --   --  2.6*  --    BILITOT 0.6  --   --  2.0*  --    ALKPHOS 130*  --   --  218*  --    AST 29  --   --  2,578*  --    ALT 29  --   --  1,667*  --       Magnesium: No results found for: MG  Phosphorus: No results found for: PHOS  Ionized Calcium: No results found for: CAION     Urinalysis:   Lab Results   Component Value Date/Time    NITRU NEGATIVE 02/11/2023 04:04 PM    COLORU Yellow 02/11/2023 04:04 PM    PHUR 6.0 02/11/2023 04:04 PM    WBCUA 5 TO 10 02/11/2023 04:04 PM    RBCUA 5 TO 10 02/11/2023 04:04 PM    MUCUS NOT REPORTED 11/15/2021 08:02 AM    TRICHOMONAS NOT REPORTED 11/15/2021 08:02 AM    YEAST NOT REPORTED 11/15/2021 08:02 AM    BACTERIA 3+ 02/11/2023 04:04 PM    SPECGRAV >1.030 02/11/2023 04:04 PM    LEUKOCYTESUR NEGATIVE 02/11/2023 04:04 PM    UROBILINOGEN ELEVATED 02/11/2023 04:04 PM    BILIRUBINUR MODERATE 02/11/2023 04:04 PM    GLUCOSEU NEGATIVE 02/11/2023 04:04 PM    KETUA 4+ 02/11/2023 04:04 PM    AMORPHOUS NOT REPORTED 11/15/2021 08:02 AM       HgBA1c:  No results found for: LABA1C  TSH:    Lab Results   Component Value Date/Time    TSH 2.59 02/11/2023 08:25 PM     Lactic Acid:   Lab Results   Component Value Date/Time    LACTA 23.9 02/11/2023 04:02 PM      Troponin: No results for input(s): TROPONINI in the last 72 hours. Radiology/Imaging:  CXR today: Patchy bl opacities, worsening on the left    ASSESSMENT:     Patient Active Problem List    Diagnosis Date Noted    Respiratory arrest ([address] 02/11/2023    Cardiac arrest due to respiratory disorder ([address] 02/11/2023    Drug-induced anaphylaxis, initial encounter 02/11/2023    Social anxiety disorder of childhood 08/14/2014    Development disorder, mixed 08/14/2014    Obesity 08/14/2014          PLAN:       HOME MEDS RECONCILED: Yes     CONSULTATION NEEDED:  Cardiology     FAMILY UPDATED:    Will today    TRANSFER OUT OF ICU:   No         Additional Assessment:  Principal Problem:    Respiratory arrest ([address]  Active Problems:    Cardiac arrest due to respiratory disorder ([address]    Drug-induced anaphylaxis, initial encounter  Resolved Problems:    * No resolved hospital problems. *           Respiratory arrest ([address]):  Secondary to anaphylactic reaction from amoxicillin. Patient is intubated. On FiO2 100% RR 26  PEEP 5. Sedated with propofol 15 mcg. CTPE: no evidence of pulmonary embolism or acute aortic disease. Pulmonary   infiltrates in both lower lobes with atelectatic changes. Focal right perihilar right upper lobe infiltrate. We will repeat ABGs within 1 hour. Cardiac arrest due to respiratory disorder ([address]  Status post CPR, epi x3 and amiodarone 150 mg. Currently in normal sinus rhythm.   Troponin is elevated 16->> 137 might be secondary to CPR.  Will hold on cooling protocol as per recommendation of attending. Drug-induced anaphylaxis, initial encounter  Anaphylactic reactions to amoxicillin. Epi drip is discontinued. We will start on Solu-Medrol 40 mg every 8 hours and Benadryl 25 mg every 8 hours. Sepsis:  Blood cultures and urine cultures sent. RL @ 30 ml/kg as a bolus. Will start on Levaquin 750 mg q 24 hrs and flagyl to cover for Aspiration PNA. Will add metronidazole 500 mg bid   Allergic to clindamycin and could not do cephalosporin due to cross reactivity with penicillin. SAMUEL  BUN/Cr : 29/2.03  Anion GAP closing  Electrolytes otherwise stable aside from Calcium of 6.9     Combined metabolic and respiratory acidosis: We will start on bicarbonate drip 150 mEq in D5 at 100 cc/h. Elevated Liver enzymes:  Secondary to shock liver. Will repeat LFTs tomorrow. ALT 1667  AST 2578  Bilirubin 2.0     History of Down syndrome:  Baseline mentation unknown. We will order echocardiography. DVT prophylaxis: Lovenox 40 mg daily. GI prophylaxis: Not needed.     Brian Ayala MD  EM Resident PGY-3  Intensive Care Unit  2/12/2023 8:09 AM

## 2023-02-12 NOTE — ED PROVIDER NOTES
[facility] [facility]     Emergency Department     Faculty Attestation        I performed a history and physical examination of the patient and discussed management with the resident. I reviewed the residents note and agree with the documented findings and plan of care. Any areas of disagreement are noted on the chart. I was personally present for the key portions of any procedures. I have documented in the chart those procedures where I was not present during the key portions. I have reviewed the emergency nurses triage note. I agree with the chief complaint, past medical history, past surgical history, allergies, medications, social and family history as documented unless otherwise noted below. For Physician Assistant/ Nurse Practitioner cases/documentation I have personally evaluated this patient and have completed at least one if not all key elements of the E/M (history, physical exam, and MDM). Additional findings are as noted. Vital Signs: BP: (!) 163/151  Heart Rate: 97  Resp: 13  Temp: 99.9 °F (37.7 °C) SpO2: 98 %  PCP:  DANNIE Wilder NP    Pertinent Comments:     Patient is a 68-year-old male with history of MR as well as sleep difficulties and hypertension. Patient had URI symptoms for the last several days and possible sinus infection was seen earlier today at [facility] and placed on amoxicillin and shortly after his first dose began having trouble breathing became cyanotic and coded. CPR was begun at home and continued by EMS with positive ROSC. Wednesday at the [place] [address] emergency room coded again with again positive ROSC and intubation was done at that time. Initially had some hypotension thought to have anaphylactic reaction/anaphylactic cardiac arrest.   Patient has been weaned down to minimal epinephrine drip and possibly may be taken off shortly. Good breath sounds bilaterally with no obvious expiratory excursion.    Equal breath sounds bilaterally as well as good fogging of the tube. Heart is regular rate and rhythm. Abdomen is soft with no obvious distention or tenderness and bowel sounds are normal.   Skin is warm and dry with no obvious rashes seen. Assessment/plan: Patient status post possible anaphylactic cardiac arrest but will consider other options as well. We will repeat chest x-ray as well as likely CT chest given \"URI symptoms\" going on for the last several days and to assess for other etiologies of arrest.   We will need MICU admission      EKG Interpretation    Interpreted by emergency department physician    Rhythm: normal sinus   Rate: normal  Axis: normal  Conduction: normal  ST Segments: no acute change  T Waves: no acute change  Q Waves: no acute change    Clinical Impression:  nonspecific EKG with no ST depression or elevation        CRITICAL CARE: There was a high probability of clinically significant/life threatening deterioration in this patient's condition which required my urgent intervention. Total critical care time was 30 minutes. This excludes any time for separately reportable procedures. (Please note that portions of this note were completed with a voice recognition program. Efforts were made to edit the dictations but occasionally words are mis-transcribed.  Whenever words are used in this note in any gender, they shall be construed as though they were used in the gender appropriate to the circumstances; and whenever words are used in this note in the singular or plural form, they shall be construed as though they were used in the form appropriate to the circumstances.)    Thuy Salter MD [facility]  Attending Emergency Medicine Physician           Jean Okeefe MD  02/11/23 2033       Jean Okeefe MD  02/11/23 2034

## 2023-02-12 NOTE — H&P
Critical Care - History and Physical Examination    Patient's name:  Samuel Long  Medical Record Number: [id number]  Patient's account/billing number: [de-identified]  Patient's YOB: 1995  Age: 32 y.o. Date of Admission: 2/11/2023  8:18 PM  Date of History and Physical Examination: 2/11/2023      Primary Care Physician: DANNIE Nunez NP  Attending Physician: Binh Hernández MD    Code Status: No Order    Chief complaint:   Chief Complaint   Patient presents with    Respiratory Arrest     Post arrest now intubated        HISTORY OF PRESENT ILLNESS:      History was obtained from chart review. Samuel Long is a 32 y.o. with PMH of   -Down syndrome    Patient was brought in to the emergency department of [facility] by EMS. Per EMS report, they were called to the residence for difficulty breathing and altered mental status. Patient was found cyanotic and bag ventilation was started. He lost pulse and compression were started on his way to the ER. On arrival to the emergency department he was found pale, unresponsive and was in respiratory arrest.  CPR was done for 10 minutes and ROSC was achieved. Patient went into unstable narrow complex tachycardia with blood pressure in 50s.  150 mg amiodarone was given and patient was subsequently intubated. He also received epi x3 and was started on epi drip. Of note, he was recently discharged from the emergency department with sinusitis and URI treated with Decadron and sent on amoxicillin. Per family conversation soon after taking his amoxicillin he turns blue and was noted to have difficulty breathing. 911 was then notified. Upon admission, pt was on ventilator AC/VC RR 26//PEEP 5 FiO2 100% sedated with propofol 15 mcg and on epi drip. Admission VBG showed 7.1 1/57/44/17. Blood pressure 115/67, MAP 80, heart rate 85. Labs showed: Anion gap metabolic acidosis bicarbonate 17, anion gap 31.   BUN 13, creatinine 1.42.  Troponin 16->> 137. proBNP 921. WBC 35.4, hemoglobin 13.9. Platelet 366. CT PE is done in the emergency department. EKG showed normal sinus rhythm. No acute ST-T wave changes. Pt is transferred to the MICU [place] [address] for further management. PAST MEDICAL HISTORY:         Diagnosis Date    Allergic rhinitis     Anxiety     MR (mental retardation)     Respiratory arrest ([address] 2/11/2023    Sleep difficulties          PAST SURGICAL HISTORY:     No past surgical history on file. ALLERGIES:      Allergies   Allergen Reactions    Amoxicillin Anaphylaxis    Bactrim [Sulfamethoxazole-Trimethoprim]     Clindamycin/Lincomycin          HOME MEDS: :      Prior to Admission medications    Medication Sig Start Date End Date Taking? Authorizing Provider   atorvastatin (LIPITOR) 20 MG tablet Take 20 mg by mouth daily    Historical Provider, MD   amoxicillin (AMOXIL) 250 MG/5ML suspension Take 10 mLs by mouth 3 times daily for 10 days 2/11/23 2/21/23  Surinder Cortes MD   Polyethylene Glycol 3350 (MIRALAX PO) Take by mouth as needed    Historical Provider, MD   cloNIDine (CATAPRES) 0.2 MG tablet Take 1 tablet by mouth 2 times daily 5/8/15   Nicholas Henry MD   loratadine (CLARITIN) 10 MG tablet Take 1 tablet by mouth daily 5/8/15   Nicholas Henry MD   fluticasone (FLONASE) 50 MCG/ACT nasal spray 1 spray by Nasal route daily 5/8/15   Nicholas Henry MD   erythromycin with ethanol (EMGEL) 2 % gel Apply topically daily. 11/4/14   Nicholas Henry MD   docusate sodium (COLACE) 100 MG capsule Take 1 capsule by mouth daily as needed for Constipation. 9/2/14   Nicholas Henry MD       SOCIAL HISTORY:       TOBACCO:   reports that he has never smoked. He does not have any smokeless tobacco history on file. ETOH:   reports no history of alcohol use. DRUGS:  reports no history of drug use.   OCCUPATION:       FAMILY HISTORY:          Problem Relation Age of Onset    Coronary Art Dis Father     High Blood Pressure Father     High Cholesterol Father     Cancer Paternal Grandfather         colon       REVIEW OF SYSTEMS (ROS):     Review of Systems -   Unable to obtain due to sedation and on ventilator. OBJECTIVE:     VITAL SIGNS:  /68   Pulse 92   Temp 99.9 °F (37.7 °C) (Bladder)   Resp 14   Ht 5' 7\" (1.702 m)   Wt 207 lb (93.9 kg)   SpO2 100%   BMI 32.42 kg/m²   Tmax over 24 hours:  Temp (24hrs), Av.5 °F (38.1 °C), Min:99.6 °F (37.6 °C), Max:101.5 °F (38.6 °C)      Patient Vitals for the past 8 hrs:   BP Temp Temp src Pulse Resp SpO2 Height Weight   23 115/68 99.9 °F (37.7 °C) Bladder 92 14 -- -- --   23 114/67 -- -- 95 14 100 % -- --   23 116/73 -- -- 94 14 100 % -- --   23 127/69 -- -- 95 13 100 % -- --   23 126/66 -- -- 97 14 100 % -- --   23 131/68 -- -- 97 13 100 % -- --   23 119/73 -- -- 97 13 100 % -- --   23 (!) 131/57 -- -- 96 13 100 % -- --   23 124/76 -- -- 96 14 99 % -- --   23 130/65 -- -- 97 18 98 % -- --   23 -- -- -- -- -- -- 5' 7\" (1.702 m) --   23 (!) 163/151 99.9 °F (37.7 °C) Oral 97 13 98 % 5' 1\" (1.549 m) 207 lb (93.9 kg)       No intake or output data in the 24 hours ending 233    Wt Readings from Last 3 Encounters:   23 207 lb (93.9 kg)   23 207 lb (93.9 kg)   17 221 lb (100.2 kg)     Body mass index is 32.42 kg/m². PHYSICAL EXAM:  Constitutional: Appearance of Down syndrome, intubated/sedated  EENT: neck supple with midline trachea. Neck: Supple, short neck trachea midline, no adenopathy,  no jvd, skin normal  Respiratory: clear to auscultation, no wheezes or rales and unlabored breathing.  No intercostal tenderness  Cardiovascular: regular rate and rhythm, normal S1, S2, no murmur noted  Abdomen: soft, nontender, nondistended, no masses or organomegaly  Extremities:   no pedal edema, no clubbing or cyanosis    MEDICATIONS:  Scheduled Meds:   methylPREDNISolone  40 mg IntraVENous Q8H    diphenhydrAMINE  25 mg IntraVENous q8h     Continuous Infusions:   propofol 15 mcg/kg/min (02/11/23 2027)    sodium bicarbonate infusion       PRN Meds:          ABGs:   Lab Results   Component Value Date/Time    FIO2 100.0 02/11/2023 08:41 PM       DATA:  Complete Blood Count:   Recent Labs     02/11/23  1602 02/11/23 2025   WBC 24.0* 39.4*   RBC 4.19* 4.48   HGB 13.4 13.9   HCT 43.2 42.4   .1* 94.6   MCH 32.0 31.0   MCHC 31.0 32.8   RDW 14.5* 14.6*    376   MPV 10.0 10.2        Last 3 Blood Glucose:   Recent Labs     02/11/23  1602 02/11/23 1812   GLUCOSE 164* 158        PT/INR:  No results found for: PROTIME, INR  PTT:  No results found for: APTT, PTT    Comprehensive Metabolic Profile:   Recent Labs     02/11/23  1602 02/11/23 1812 02/11/23 2041     --   --    K 4.9  --   --    CL 92*  --   --    CO2 17*  --   --    BUN 13  --   --    CREATININE 1.42*  --  2.13*   GLUCOSE 164* 158  --    CALCIUM 10.0  --   --    PROT 6.7  --   --    LABALBU 3.0*  --   --    BILITOT 0.6  --   --    ALKPHOS 130*  --   --    AST 29  --   --    ALT 29  --   --       Magnesium: No results found for: MG  Phosphorus: No results found for: PHOS  Ionized Calcium: No results found for: CAION     Urinalysis:   Lab Results   Component Value Date/Time    NITRU NEGATIVE 02/11/2023 04:04 PM    COLORU Yellow 02/11/2023 04:04 PM    PHUR 6.0 02/11/2023 04:04 PM    WBCUA 5 TO 10 02/11/2023 04:04 PM    RBCUA 5 TO 10 02/11/2023 04:04 PM    MUCUS NOT REPORTED 11/15/2021 08:02 AM    TRICHOMONAS NOT REPORTED 11/15/2021 08:02 AM    YEAST NOT REPORTED 11/15/2021 08:02 AM    BACTERIA 3+ 02/11/2023 04:04 PM    SPECGRAV >1.030 02/11/2023 04:04 PM    LEUKOCYTESUR NEGATIVE 02/11/2023 04:04 PM    UROBILINOGEN ELEVATED 02/11/2023 04:04 PM    BILIRUBINUR MODERATE 02/11/2023 04:04 PM    GLUCOSEU NEGATIVE 02/11/2023 04:04 PM    KETUA 4+ 02/11/2023 04:04 PM    AMORPHOUS NOT REPORTED 11/15/2021 08:02 AM       HgBA1c:  No results found for: LABA1C  TSH:    Lab Results   Component Value Date/Time    TSH 2.59 02/11/2023 08:25 PM       Lactic Acid:   Lab Results   Component Value Date/Time    LACTA 23.9 02/11/2023 04:02 PM      Troponin: No results for input(s): TROPONINI in the last 72 hours. Electrocardiogram:   Normal sinus rhythm. No ST-T wave changes. Radiological imaging  XR CHEST PORTABLE    Result Date: 2/11/2023  EXAMINATION: ONE XRAY VIEW OF THE CHEST 2/11/2023 9:06 pm COMPARISON: Earlier today HISTORY: ORDERING SYSTEM PROVIDED HISTORY: Intubated, transport from [place] TECHNOLOGIST PROVIDED HISTORY: Intubated, transport from [place] Reason for Exam: supine,et placemen,post arrest,drug induced anaphylaxix FINDINGS: Endotracheal tube is minimally above the mika. NG tube is in the stomach. The barely. The side hole is not seen. Endotracheal tube is barely above the mika. NG tube is in the stomach but the side hole is not seen. XR CHEST PORTABLE    Result Date: 2/11/2023  EXAMINATION: ONE XRAY VIEW OF THE CHEST 2/11/2023 6:27 pm COMPARISON: February 11, 2011 at 13:44 HISTORY: ORDERING SYSTEM PROVIDED HISTORY: cardiac arrest TECHNOLOGIST PROVIDED HISTORY: Cardiac arrest FINDINGS: Patient is intubated with endotracheal tube barely above the mika. Nasogastric tube in the stomach. Heart, mediastinum and pleural surfaces appear stable. Perihilar congestion and mild interstitial edema. No obvious rib fractures. Endotracheal tube below just above the mika. Nasogastric tube in the stomach. Perihilar congestion and mild interstitial edema. XR CHEST PORTABLE    Result Date: 2/11/2023  EXAMINATION: ONE XRAY VIEW OF THE CHEST 2/11/2023 1:17 pm COMPARISON: None. HISTORY: ORDERING SYSTEM PROVIDED HISTORY: decadron TECHNOLOGIST PROVIDED HISTORY: decadron FINDINGS: AP portable view of the chest time stamped at 1347 hours is submitted.   Mild dextroscoliotic curvature is noted in the dorsal spine. Central vascularity is top normal.  No cardiomegaly, vascular congestion, focal consolidation, effusion or pneumothorax is noted. Osseous structures are age-appropriate     Central vascularity is top normal.  Otherwise unremarkable chest.     ASSESSMENT/PLAN:       Respiratory arrest ([address]):  Secondary to anaphylactic reaction from amoxicillin. Patient is intubated. On FiO2 100% RR 26  PEEP 5. Sedated with propofol 15 mcg. CTPE: no evidence of pulmonary embolism or acute aortic disease. Pulmonary   infiltrates in both lower lobes with atelectatic changes. Focal right perihilar right upper lobe infiltrate. We will repeat ABGs within 1 hour. Cardiac arrest due to respiratory disorder ([address]  Status post CPR, epi x3 and amiodarone 150 mg. Currently in normal sinus rhythm. Troponin is elevated 16->> 137 might be secondary to CPR. Will hold on cooling protocol as per recommendation of attending. Drug-induced anaphylaxis, initial encounter  Anaphylactic reactions to amoxicillin. Epi drip is discontinued. We will start on Solu-Medrol 40 mg every 8 hours and Benadryl 25 mg every 8 hours. Sepsis:  Blood cultures and urine cultures sent. RL @ 30 ml/kg as a bolus. Will start on Levaquin 750 mg q 24 hrs and flagyl to cover for Aspiration PNA. Allergic to clindamycin and could not do cephalosporin due to cross reactivity with penicillin. Combined metabolic and respiratory acidosis: We will start on bicarbonate drip 150 mEq in D5 at 100 cc/h. Elevated Liver enzymes:  Secondary to shock liver. Will repeat LFTs tomorrow. History of Down syndrome:  Baseline mentation unknown. We will order echocardiography. DVT prophylaxis: Lovenox 40 mg daily. GI prophylaxis: Not needed. ICU PROPHYLAXIS:  Stress ulcer:  [] PPI Agent  [] [id number] [] Sucralfate  [] Other:  VTE:   [x] Enoxaparin  [] Unfract.  Heparin Subcut  [] EPC Cuffs    NUTRITION:  [] NPO  [] Tube Feeding (Specify: ) [] TPN  [] PO    CONSULTATION NEEDED:  [] No   [x] Yes     HOME MEDICATIONS RECONCILED: [x] No  [] Yes    FAMILY UPDATED:    [x] No   [] Yes       Elissa Petersen MD,   ICU resident   Ana  2/11/2023 10:43 PM    The critical care team assigned to the patient will be following up the patient in the intensive care unit. I have discussed the current plan with the critical care attending. The above mentioned assessment and plan will be reviewed again in detail by the critical care attending at bedside, and can be further changed or modified accordingly by the attending physician. Attending Physician Statement  I have discussed the care of Addis Rosales, including pertinent history and exam findings with the resident. I have reviewed the key elements of all parts of the encounter with the resident. I have seen and examined the patient with the resident. I agree with the assessment and plan and status of the problem list as documented. I seen the patient during the round today, chart reviewed, labs and medications reviewed overnight events noted. He has history of Down syndrome apparently initially presented to urgent care with cough congestion and possibly fever it was reported his COVID was negative then he presented to ER in [place] yesterday and at that time with similar complaints but also had vesicles in mouth. Patient was seen in the emergency room and he was given a prescription for amoxicillin.   He apparently took the medication reach home and apparently developed shortness of breath/altered mental status EMS got called patient had CPR for 10 minutes apparently PEA arrest and was taken to [place] ER he had unstable tachycardia was given amiodarone and started on amiodarone he also had arrest with epinephrine x3 and is started on epi drip and was transferred to [place].  CT scan of the chest was negative for pulm embolism shows bilateral lower lobe areas of atelectasis possibly consolidation. Chest x-ray shows left lower lobe possible consolidation/atelectasis. He apparently was on propofol drip initially propofol was drip was stopped early morning he had not had cough or gag reflex or corneal reflexes no spontaneous movement was noted. His epinephrine drip was discontinued on arrival to ICU and he was supposed to start Levophed but has not started Levophed and did not require pressor support since admission. Patient is on levofloxacin and Flagyl and Solu-Medrol is also given. Lactic acid is 4.5 he has SAMUEL with creatinine of 1.42 which increased to 2.03. Troponin was 137 which increased to 230. AST ALT which was normal initially in the ER increased to 2578 and ALT of 1667. WBC count initially was 39,000 which is 28,000 today and INR is 2.1. So far blood cultures are negative. COVID rapid was reported to be negative. Initial blood gas was 7.1 2/57/44/76 which was a venous blood gas which improved to pH of 7.30 and a bicarb of 16.9. Lactic acid initially was 9 which was decreased to 5.5. Ventilator setting PRVC/20/530/8/30 percent and ABG this morning was 7.4 3/33/84/19. Cardiac arrest/PEA arrest   Acute hypoxic hypercapnic respiratory failure. SAMUEL. Lactic acidosis likely secondary to cardiorespiratory arrest.  Troponin elevation/non-ST elevation MI. Ischemic hepatitis secondary to cardiorespiratory arrest.    Patient is currently getting lactated Ringer's and also on bicarbonate drip. Will adjust bicarbonate drip and discontinue lactated Ringer's. Recheck BMP at 2:00. Continue to check blood sugar. Discontinue Solu-Medrol. Follow-up INR. Infectious disease evaluation and will need adjustment of antibiotic. Follow-up INR tomorrow. We will follow-up serial LFTs daily. Will increase the bicarbonate drip depending upon the BMP.   2D echocardiogram.  Cardiology evaluation and follow-up troponins. Follow-up WBC count, follow-up cultures. Will need neuro critical care evaluation for prognostication. Will need LTM E.        Discussed with nursing staff, treatment and plan discussed. Discussed with respiratory therapist.    Total critical care time caring for this patient with life threatening, unstable organ failure, including direct patient contact, management of life support systems, review of data including imaging and labs, discussions with other team members and physicians at least 54  Min so far today, excluding procedures. Please note that this chart was generated using voice recognition Dragon dictation software. Although every effort was made to ensure the accuracy of this automated transcription, some errors in transcription may have occurred.      Vick Alarcon MD  2/12/2023 9:41 AM

## 2023-02-12 NOTE — ED PROVIDER NOTES
[facility]  ED  Emergency Department Encounter  Emergency Medicine Resident     Pt Rocco Man  MRN: [id number]  [place] 1995  Date of evaluation: 2/11/23  PCP:  DANNIE Tee NP  Note Started: 8:24 PM EST      CHIEF COMPLAINT       Chief Complaint   Patient presents with    Respiratory Arrest     Post arrest now intubated        HISTORY OF PRESENT ILLNESS  (Location/Symptom, Timing/Onset, Context/Setting, Quality, Duration, Modifying Factors, Severity.)      Rj Torres is a 32 y.o. male who presents as a transfer from [facility] after cardiac arrest with suspected respiratory arrest.  There was suspected anaphylaxis reaction as he received amoxicillin today after being seen for a URI. Patient represented after being found altered cyanotic with a pulse. Patient lost pulses in route to ED and compressions were started. Downtime of 10 minutes, patient got ROSC after 2 rounds of epi. Per family, patient started having difficulty breathing shortly after amoxicillin was given. Patient does have a history of Down syndrome. Patient coded for a second time in the emergency department at [facility] and got ROSC. After intubation at [facility], patient went into a wide tachyarrhythmia and was given a dose of amiodarone. PAST MEDICAL / SURGICAL / SOCIAL / FAMILY HISTORY      has a past medical history of Allergic rhinitis, Anxiety, MR (mental retardation), and Sleep difficulties. has no past surgical history on file.     Social History     Socioeconomic History    Marital status: Single     Spouse name: Not on file    Number of children: Not on file    Years of education: Not on file    Highest education level: Not on file   Occupational History    Not on file   Tobacco Use    Smoking status: Never    Smokeless tobacco: Not on file   Substance and Sexual Activity    Alcohol use: No    Drug use: No    Sexual activity: Never   Other Topics Concern    Not on file   Social History Narrative    Not on file     Social Determinants of Health     Financial Resource Strain: Not on file   Food Insecurity: Not on file   Transportation Needs: Not on file   Physical Activity: Not on file   Stress: Not on file   Social Connections: Not on file   Intimate Partner Violence: Not on file   Housing Stability: Not on file       Family History   Problem Relation Age of Onset    Coronary Art Dis Father     High Blood Pressure Father     High Cholesterol Father     Cancer Paternal Grandfather         colon       Allergies:  Amoxicillin, Bactrim [sulfamethoxazole-trimethoprim], and Clindamycin/lincomycin    Home Medications:  Prior to Admission medications    Medication Sig Start Date End Date Taking? Authorizing Provider   atorvastatin (LIPITOR) 20 MG tablet Take 20 mg by mouth daily    Historical Provider, MD   amoxicillin (AMOXIL) 250 MG/5ML suspension Take 10 mLs by mouth 3 times daily for 10 days 2/11/23 2/21/23  Yoselin Diaz MD   Polyethylene Glycol 3350 (MIRALAX PO) Take by mouth as needed    Historical Provider, MD   cloNIDine (CATAPRES) 0.2 MG tablet Take 1 tablet by mouth 2 times daily 5/8/15   Jyotsna El MD   loratadine (CLARITIN) 10 MG tablet Take 1 tablet by mouth daily 5/8/15   Jyotsna El MD   fluticasone (FLONASE) 50 MCG/ACT nasal spray 1 spray by Nasal route daily 5/8/15   Jyotsna El MD   erythromycin with ethanol (EMGEL) 2 % gel Apply topically daily. 11/4/14   Jyotsna El MD   docusate sodium (COLACE) 100 MG capsule Take 1 capsule by mouth daily as needed for Constipation.  9/2/14   Jyotsna El MD     REVIEW OF SYSTEMS       Review of Systems   Unable to perform ROS: Intubated     PHYSICAL EXAM      INITIAL VITALS:   /67   Pulse 95   Temp 99.9 °F (37.7 °C) (Oral)   Resp 14   Ht 5' 7\" (1.702 m)   Wt 207 lb (93.9 kg)   SpO2 100%   BMI 32.42 kg/m²     Physical Exam  Constitutional:       Comments: GCS 3T  No sedation  Biting ETT   HENT:      Nose: Nose normal.      Mouth/Throat:      Mouth: Mucous membranes are moist.      Pharynx: Oropharynx is clear. Eyes:      Pupils: Pupils are equal, round, and reactive to light. Cardiovascular:      Rate and Rhythm: Normal rate and regular rhythm. Pulmonary:      Effort: Pulmonary effort is normal.      Breath sounds: Normal breath sounds. Abdominal:      Palpations: Abdomen is soft. Tenderness: There is no abdominal tenderness. There is no right CVA tenderness or left CVA tenderness. Skin:     Capillary Refill: Capillary refill takes less than 2 seconds. DDX/DIAGNOSTIC RESULTS / EMERGENCY DEPARTMENT COURSE / MDM     Medical Decision Making  Anaphylaxis reaction, PE, pneumonia, URI, hypoxic arrest, hypercarbic arrest    55-year-old gentleman presents to the emergency department as a transfer from [facility] after a suspected anaphylactic shock and subsequent respiratory and cardiac arrest.  Patient intubated at outlying facility. Was given amoxicillin earlier today and shortly became short of breath per family member. History of Down syndrome. Vital signs stable, SPO2 98% on arrival while being intubated. Bilateral breath sounds relatively clear. Patient discontinued off of epinephrine drip as blood pressure was sustaining. GCS 3 T, however patient biting tube, propofol started, fluids given with propofol. Work-up ordered. CT PE pending. Patient admitted to medical critical care for further care and management. Amount and/or Complexity of Data Reviewed  Labs: ordered. Radiology: ordered. ECG/medicine tests: ordered. Risk  Prescription drug management. Decision regarding hospitalization. EMERGENCY DEPARTMENT COURSE:         PROCEDURES:  None    CONSULTS:  IP CONSULT TO CRITICAL CARE      FINAL IMPRESSION      1. Anaphylactic shock    2.  Cardiac arrest [facility])          DISPOSITION / PLAN     DISPOSITION Admitted 02/11/2023 08:57:39 PM      PATIENT REFERRED TO:  Michelle Lerma APRN - NP  [address]  [phone]    Schedule an appointment as soon as possible for a visit   For follow up    [facility]  [address] [address]  [phone]  Go to   As needed    DISCHARGE MEDICATIONS:  New Prescriptions    No medications on file       Jorge Collado MD  Emergency Medicine Resident    (Please note that portions of thisnote were completed with a voice recognition program.  Efforts were made to edit the dictations but occasionally words are mis-transcribed.)       Jorge Collado MD  Resident  02/11/23 [id number]

## 2023-02-12 NOTE — PLAN OF CARE
Problem: Discharge Planning  Goal: Discharge to home or other facility with appropriate resources  2/12/2023 0932 by Maggie Freeman  Outcome: Progressing  2/12/2023 0305 by Ada Petty RN  Outcome: Progressing  Flowsheets (Taken 2/11/2023 2150)  Discharge to home or other facility with appropriate resources:   Identify barriers to discharge with patient and caregiver   Arrange for needed discharge resources and transportation as appropriate   Identify discharge learning needs (meds, wound care, etc)     Problem: Pain  Goal: Verbalizes/displays adequate comfort level or baseline comfort level  2/12/2023 0932 by Maggie Freeman  Outcome: Progressing  2/12/2023 0305 by Ada Petty RN  Outcome: Progressing     Problem: Confusion  Goal: Confusion, delirium, dementia, or psychosis is improved or at baseline  Description: INTERVENTIONS:  1. Assess for possible contributors to thought disturbance, including medications, impaired vision or hearing, underlying metabolic abnormalities, dehydration, psychiatric diagnoses, and notify attending LIP  2. [place] high risk fall precautions, as indicated  3. Provide frequent short contacts to provide reality reorientation, refocusing and direction  4. Decrease environmental stimuli, including noise as appropriate  5. Monitor and intervene to maintain adequate nutrition, hydration, elimination, sleep and activity  6. If unable to ensure safety without constant attention obtain sitter and review sitter guidelines with assigned personnel  7. Initiate Psychosocial CNS and Spiritual Care consult, as indicated  2/12/2023 0932 by Maggie Freeman  Outcome: Progressing  2/12/2023 0305 by Ada Petty RN  Outcome: Progressing     Problem: Safety - Medical Restraint  Goal: Remains free of injury from restraints (Restraint for Interference with Medical Device)  Description: INTERVENTIONS:  1.  Determine that other, less restrictive measures have been tried or would not be effective before applying the restraint  2. Evaluate the patient's condition at the time of restraint application  3. Inform patient/family regarding the reason for restraint  4.  Q2H: Monitor safety, psychosocial status, comfort, nutrition and hydration  2/12/2023 0932 by Anabela Camejo  Outcome: Completed  Flowsheets  Taken 2/12/2023 0600 by Lelo Pierson RN  Remains free of injury from restraints (restraint for interference with medical device):   Determine that other, less restrictive measures have been tried or would not be effective before applying the restraint   Evaluate the patient's condition at the time of restraint application   Inform patient/family regarding the reason for restraint   Every 2 hours: Monitor safety, psychosocial status, comfort, nutrition and hydration  Taken 2/12/2023 0400 by Lelo Pierson RN  Remains free of injury from restraints (restraint for interference with medical device):   Determine that other, less restrictive measures have been tried or would not be effective before applying the restraint   Evaluate the patient's condition at the time of restraint application   Inform patient/family regarding the reason for restraint   Every 2 hours: Monitor safety, psychosocial status, comfort, nutrition and hydration  2/12/2023 0305 by Lelo Pierson RN  Outcome: Progressing  Flowsheets  Taken 2/12/2023 0200  Remains free of injury from restraints (restraint for interference with medical device):   Determine that other, less restrictive measures have been tried or would not be effective before applying the restraint   Evaluate the patient's condition at the time of restraint application   Inform patient/family regarding the reason for restraint   Every 2 hours: Monitor safety, psychosocial status, comfort, nutrition and hydration  Taken 2/12/2023 0000  Remains free of injury from restraints (restraint for interference with medical device):   Determine that other, less restrictive measures have been tried or would not be effective before applying the restraint   Evaluate the patient's condition at the time of restraint application   Inform patient/family regarding the reason for restraint   Every 2 hours: Monitor safety, psychosocial status, comfort, nutrition and hydration  Taken 2/11/2023 2372  Remains free of injury from restraints (restraint for interference with medical device):   Determine that other, less restrictive measures have been tried or would not be effective before applying the restraint   Evaluate the patient's condition at the time of restraint application   Inform patient/family regarding the reason for restraint   Every 2 hours: Monitor safety, psychosocial status, comfort, nutrition and hydration

## 2023-02-12 NOTE — CONSULTS
Neuro Critical Care Consult Note    Reason for Consult:  Post cardiac arrest  Requesting Physician: Dr. Keturah Shaikh  Attending Physician:     History Obtained From:  electronic medical record    CHIEF COMPLAINT:       Post cardiac arrest    HISTORY OF PRESENT ILLNESS:       The patient is a 32 y.o. male with history of down syndrome who presents as a transfer from [facility] ED for cardiac arrest. Patient reportedly had been sick with upper respiratory symptoms for the past week, was prescribed amoxicillin and decadron and discharged home. After taking his first dose of amoxicillin, patient developed difficulty breathing and altered mentation. Upon EMS arrival, he was found cyanotic with pulse and bag ventilation was started. En route to the ER, patient lost pulses, unclear initial rhythm. CPR was done for ? 20 minutes with ROSC achieved. He then went into an unstable wide complex tachycardia with SBP 50s. 150 mg amiodarone administered and patient was subsequently intubated. Started on an epinephrine infusion and transferred to [facility]. Admitted to MICU, started on antibiotics and steroids pneumonia. Epinephrine infusion discontinued, started on bicarb infusion. Propofol utilized overnight due to breath stacking per the RN. Neuro ICU consulted for neuro prognostication. On exam, patient open eyes spontaneously, dysconjugate gaze. Does not track or follow commands. Pupils equal and reactive. Absent corneals, cough/gag. No movement to noxious stimulation. Will hook up to LTME to evaluate for epileptiform activity and evaluate baseline reactivity. NSE at 24, 48, and 72 hours. CT head when stable.        PAST MEDICAL HISTORY :       Past Medical History:        Diagnosis Date    Allergic rhinitis     Anxiety     MR (mental retardation)     Respiratory arrest ([facility] [address] 2/11/2023    Sleep difficulties        Past Surgical History:    No past surgical history on file.    Social History:   Social History     Socioeconomic History    Marital status: Single     Spouse name: Not on file    Number of children: Not on file    Years of education: Not on file    Highest education level: Not on file   Occupational History    Not on file   Tobacco Use    Smoking status: Never    Smokeless tobacco: Not on file   Substance and Sexual Activity    Alcohol use: No    Drug use: No    Sexual activity: Never   Other Topics Concern    Not on file   Social History Narrative    Not on file     Social Determinants of Health     Financial Resource Strain: Not on file   Food Insecurity: Not on file   Transportation Needs: Not on file   Physical Activity: Not on file   Stress: Not on file   Social Connections: Not on file   Intimate Partner Violence: Not on file   Housing Stability: Not on file       Family History:       Problem Relation Age of Onset    Coronary Art Dis Father     High Blood Pressure Father     High Cholesterol Father     Cancer Paternal Grandfather         colon       Allergies:  Amoxicillin, Bactrim [sulfamethoxazole-trimethoprim], and Clindamycin/lincomycin    Home Medications:  Prior to Admission medications    Medication Sig Start Date End Date Taking? Authorizing Provider   atorvastatin (LIPITOR) 20 MG tablet Take 20 mg by mouth daily    Historical Provider, MD   amoxicillin (AMOXIL) 250 MG/5ML suspension Take 10 mLs by mouth 3 times daily for 10 days 2/11/23 2/21/23  Hossein Donnelly MD   Polyethylene Glycol 3350 (MIRALAX PO) Take by mouth as needed    Historical Provider, MD   cloNIDine (CATAPRES) 0.2 MG tablet Take 1 tablet by mouth 2 times daily 5/8/15   Veena Estimable, MD   loratadine (CLARITIN) 10 MG tablet Take 1 tablet by mouth daily 5/8/15   Veena Estimable, MD   fluticasone (FLONASE) 50 MCG/ACT nasal spray 1 spray by Nasal route daily 5/8/15   Veena EstimMD paula   erythromycin with ethanol (EMGEL) 2 % gel Apply topically daily.  11/4/14   Veena Tanner MD docusate sodium (COLACE) 100 MG capsule Take 1 capsule by mouth daily as needed for Constipation.  9/2/14   Stanford Lubin MD       Current Medications:   Current Facility-Administered Medications: norepinephrine (LEVOPHED) 16 mg in sodium chloride 0.9 % 250 mL infusion, 2-100 mcg/min, IntraVENous, Continuous  fentaNYL (SUBLIMAZE) injection 50 mcg, 50 mcg, IntraVENous, Q1H PRN  sodium bicarbonate 75 mEq in sodium chloride 0.45 % 1,000 mL infusion, , IntraVENous, Continuous  glucose chewable tablet 16 g, 4 tablet, Oral, PRN  dextrose bolus 10% 125 mL, 125 mL, IntraVENous, PRN **OR** dextrose bolus 10% 250 mL, 250 mL, IntraVENous, PRN  glucagon (rDNA) injection 1 mg, 1 mg, SubCUTAneous, PRN  dextrose 10 % infusion, , IntraVENous, Continuous PRN  insulin lispro (HUMALOG) injection vial 0-8 Units, 0-8 Units, SubCUTAneous, Q4H  lubrifresh P.M. (artificial tears) ophthalmic ointment, , Both Eyes, PRN  propofol injection, 5-50 mcg/kg/min, IntraVENous, Continuous  diphenhydrAMINE (BENADRYL) injection 25 mg, 25 mg, IntraVENous, q8h  sodium chloride flush 0.9 % injection 5-40 mL, 5-40 mL, IntraVENous, 2 times per day  sodium chloride flush 0.9 % injection 5-40 mL, 5-40 mL, IntraVENous, PRN  0.9 % sodium chloride infusion, , IntraVENous, PRN  enoxaparin (LOVENOX) injection 40 mg, 40 mg, SubCUTAneous, Daily  ondansetron (ZOFRAN-ODT) disintegrating tablet 4 mg, 4 mg, Oral, Q8H PRN **OR** ondansetron (ZOFRAN) injection 4 mg, 4 mg, IntraVENous, Q6H PRN  polyethylene glycol (GLYCOLAX) packet 17 g, 17 g, Oral, Daily PRN  potassium chloride 20 mEq/50 mL IVPB (Central Line), 20 mEq, IntraVENous, PRN **OR** potassium chloride 10 mEq/100 mL IVPB (Peripheral Line), 10 mEq, IntraVENous, PRN  magnesium sulfate 2000 mg in 50 mL IVPB premix, 2,000 mg, IntraVENous, PRN  sodium phosphate 10 mmol in sodium chloride 0.9 % 250 mL IVPB, 10 mmol, IntraVENous, PRN **OR** sodium phosphate 15 mmol in sodium chloride 0.9 % 250 mL IVPB, 15 mmol, IntraVENous, PRN **OR** sodium phosphate 20 mmol in sodium chloride 0.9 % 500 mL IVPB, 20 mmol, IntraVENous, PRN    REVIEW OF SYSTEMS:       Unable to assess due to current condition    PHYSICAL EXAM:       /82   Pulse (!) 105   Temp (!) 102 °F (38.9 °C) (Bladder) Comment: cooling blanket continued, ice packs applied  Resp 18   Ht 5' 7\" (1.702 m)   Wt 200 lb 13.4 oz (91.1 kg)   SpO2 95%   BMI 31.46 kg/m²       CONSTITUTIONAL:  Intubated, sedation held. Opens eyes spontaneously. Does not track or follow commands. HEAD:  normocephalic,   EYES:  Pupils equal and reactive bilaterally. Dysconjugate gaze   ENT:  moist mucous membranes   NECK:  supple, symmetric   LUNGS:  Equal air entry bilaterally   CARDIOVASCULAR:  normal s1 / s2   ABDOMEN:  Soft, no rigidity   NEUROLOGIC:  Mental Status:  Intubated, sedation held. Opens eyes spontaneously. Cranial Nerves:    III: Pupils:  equal, round, reactive to light  V: Corneal reflex:  completed. abnormal absent bilaterally  Absent cough/gag    Motor Exam:    No movement to noxious stimulation   SKIN:  no rash        LABS AND IMAGING:     CBC with Differential:    Lab Results   Component Value Date/Time    WBC 28.2 02/12/2023 06:36 AM    RBC 4.62 02/12/2023 06:36 AM    HGB 14.2 02/12/2023 06:36 AM    HCT 40.9 02/12/2023 06:36 AM    PLT Platelet clumps present, count appears adequate.  02/12/2023 06:36 AM    MCV 88.5 02/12/2023 06:36 AM    MCH 30.7 02/12/2023 06:36 AM    MCHC 34.7 02/12/2023 06:36 AM    RDW 14.6 02/12/2023 06:36 AM    NRBC 1 02/11/2023 08:25 PM    LYMPHOPCT 2 02/12/2023 06:36 AM    MONOPCT 2 02/12/2023 06:36 AM    BASOPCT 0 02/12/2023 06:36 AM    MONOSABS 0.56 02/12/2023 06:36 AM    LYMPHSABS 0.56 02/12/2023 06:36 AM    EOSABS 0.00 02/12/2023 06:36 AM    BASOSABS 0.00 02/12/2023 06:36 AM    DIFFTYPE NOT REPORTED 04/25/2019 10:39 AM     BMP:    Lab Results   Component Value Date/Time     02/12/2023 06:36 AM    K 4.0 02/12/2023 06:36 AM     02/12/2023 06:36 AM    CO2 16 02/12/2023 06:36 AM    BUN 29 02/12/2023 06:36 AM    LABALBU 2.6 02/12/2023 01:52 AM    CREATININE 2.03 02/12/2023 06:36 AM    CALCIUM 6.9 02/12/2023 06:36 AM    GFRAA >60 03/22/2022 11:46 AM    LABGLOM 45 02/12/2023 06:36 AM    GLUCOSE 202 02/12/2023 06:36 AM       Radiology Review:    XR CHEST (SINGLE VIEW FRONTAL)   Final Result   1. Interval placement of right IJ approach central line, tip overlying the   SVC region. No evidence of pneumothorax. 2. Patchy bilateral infrahilar opacities. Similar on the right and   intervally worse on the left. XR ABDOMEN FOR NG/OG/NE TUBE PLACEMENT   Final Result   Enteric tube in appropriate position. CT CHEST PULMONARY EMBOLISM W CONTRAST   Final Result   No evidence of pulmonary embolism or acute aortic disease. Pulmonary   infiltrates in both lower lobes with atelectatic changes. Focal right   perihilar right upper lobe infiltrate. Mild pericardial effusion. Mild right hilar lymphadenopathy. Life support system appear stable with endotracheal and nasogastric tubes in   good position. XR CHEST PORTABLE   Final Result   Endotracheal tube is barely above the mika. NG tube is in the stomach but   the side hole is not seen. CT HEAD WO CONTRAST    (Results Pending)           ASSESSMENT AND PLAN:       31 yo male with history of down syndrome (unclear baseline) who presents post cardiac arrest related to anaphylactic reaction to amoxicillin. Has been ill with upper respiratory symptoms for past 5-6 days and was discharged home yesterday with amoxicillin and steroids. Shortly after receiving his first dose of antibiotics, he developed respiratory distress and AMS. Upon EMS arrival, patient was cyanotic and lost pulses shortly thereafter. 20 minutes of CPR with ROSC. Followed by unstable narrow complex rhythm, given 150 mg amiodarone. Transferred from [facility] ED and admitted to MICU. Neuro ICU admit for neuro prognostication. Cardiac arrest secondary to respiratory failure related to anaphylactic reaction to amoxicillin  Unclear initial rhythm  Elevated troponin    Respiratory arrest   Intubated, CPAP this morning  CTPE: no evidence of pulmonary embolism or acute aortic disease. Pulmonary   infiltrates in both lower lobes with atelectatic changes. Focal right perihilar right upper lobe infiltrate. Drug-induced anaphylaxis, initial encounter  Anaphylactic reactions to amoxicillin. Epi drip is discontinued. Solu-Medrol 40 mg every 8 hours and Benadryl 25 mg every 8 hours. Sepsis  Cultures sent  On Levaquin, Flagyl, and metronidazole  Febrile, recommend to maintain euthermia    SAMUEL and shock liver    Encephalopathy  Poor neurological examination  CT head when stable  LTME  NSE @ 24,48,72 hours  MRI at 72 hours    We will continue to follow along. For any changes in exam or patient status please contact Neuro Critical Care.       DANNIE Pittman - CNP  Neuro Critical Care  Pager [phone]  2/12/2023     10:54 AM

## 2023-02-12 NOTE — ED NOTES
[facility] [place]. Story per EMS pt was found down at home 10 min until EMS arrived. CPR started for 20min, obtained ROSC 5min after arriving at [facility] ED. Then pt was intubated, then changed to an unstable rhythm per EMS. CPR then started, amnio given and pt obtained ROSC. Pt received no sedation until [facility] transfer, received Fentanyl and Versed PTA. Earlier today, pt received antibiotic for UTI. Pt took Amoxicillin, allergy suspected by [place] ED. Pt has Hx of down syndrome. Pt intubated, OG, 2 IVs, and Pena placed at [facility] ED. Epi was started 1mcg/min by [place] ED d/t soft pressures. Pressures stable at [facility]. V's, epi d/c by verbal order Dr. Mario Hinojosa.       Ezra Pena, RN  02/11/23 2051

## 2023-02-12 NOTE — CONSULTS
[facility] -   Infectious diseases evaluation  admission date 2/11/2023    reason for consultation:   sepsis    Impression :   Current:  Anaphylactic shock after amoxicillin  Respiratory and cardiac arrest, intubated  Bilateral lower lobe atelectasis/aspiration pneumonia  Bandemia   Lactic acidosis  hypothermia  Other:    Discussion / summary of stay / plan of care     Recommendations   Anaphylaxis to amoxicillin, completely deferred the beta-lactam family at this point  Keep Avelox to cover pneumococcus as well as aspiration  Pend sp cx  Follow with you    Infection Control Recommendations   Levels Precautions  Contact Isolation       Antimicrobial Stewardship Recommendations   Simplification of therapy  Targeted therapy      History of Present Illness:   Initial history:  Linh Sebastian is a 32y.o.-year-old male with Down syndrome, was at home and was very short of breath altered mentation, EMS called he was cyanotic then respiratory arrest, CPR, 10 minutes to ROSC,  Unstable narrow complex tachycardia blood pressure of 5 0, given amiodarone and intubated. Since admission to the ER with sinusitis was treated with amoxicillin and Decadron.   According to the family, as soon as he took the amoxicillin he turned blue and had difficulty breathing and this is when they called the EMS    Patient placed on FiO2 of 100 and PEEP of 5  Transferred after that to [facility]    WBC elevated likely from steroids  Surgery consulted and planning LTME  CT scan of the head when stable  Chest x-ray on 2/12 shows patchy bilateral infrahilar opacities, seems to have increased on the left      Interval changes  2/12/2023   Patient Vitals for the past 8 hrs:   BP Temp Temp src Pulse Resp SpO2 Height   02/12/23 1700 -- -- -- (!) 118 18 96 % --   02/12/23 1600 107/86 (!) 101.8 °F (38.8 °C) Bladder (!) 120 18 97 % --   02/12/23 1521 -- -- -- (!) 119 16 97 % --   02/12/23 1500 -- -- -- (!) 116 18 97 % --   02/12/23 1402 -- -- -- -- -- -- 5' 7.01\" (1.702 m)   02/12/23 1400 -- (!) 101.1 °F (38.4 °C) Bladder (!) 113 16 95 % --   02/12/23 1300 -- -- -- (!) 113 17 97 % --   02/12/23 1200 (!) 132/91 (!) 100.6 °F (38.1 °C) Bladder (!) 106 19 96 % --   02/12/23 1149 -- -- -- (!) 101 17 96 % --   02/12/23 1130 -- -- -- (!) 104 17 96 % --   02/12/23 1100 -- -- -- (!) 114 18 97 % --   02/12/23 1030 -- -- -- (!) 106 17 95 % --       Summary of relevant labs:  Labs:  WBC  24 -31 - 28  Micro:  Sp cx GPR 2/12  BC 2/12  Nasal MRSA neg 2/11  Resp PCR panel neg   U cx neg 2/11    Imaging:  Chest x-ray 2/12 patchy bilateral infrahilar opacities left more than the right    2/11 CT scan of the chest.  No PE, pulmonary infiltrate bilateral lower lobes with atelectasis, focal right perihilar infiltrate      I have personally reviewed the past medical history, past surgical history, medications, social history, and family history, and I haveupdated the database accordingly. Allergies:   Amoxicillin, Bactrim [sulfamethoxazole-trimethoprim], and Clindamycin/lincomycin     Review of Systems:     Review of Systems   Unable to perform ROS: Intubated     Physical Examination :       Physical Exam  Constitutional:       General: He is not in acute distress. Appearance: He is ill-appearing. HENT:      Head: Normocephalic and atraumatic. Nose: Nose normal.      Mouth/Throat:      Mouth: Mucous membranes are moist.   Eyes:      General: No scleral icterus. Cardiovascular:      Rate and Rhythm: Normal rate and regular rhythm. Heart sounds: Normal heart sounds. No murmur heard. Pulmonary:      Breath sounds: No stridor. No rhonchi. Abdominal:      Palpations: There is no mass. Hernia: No hernia is present. Genitourinary:     Comments: No cheng  Musculoskeletal:         General: No swelling or deformity. Cervical back: Neck supple. No rigidity. Skin:     Coloration: Skin is not jaundiced. Findings: No bruising. Neurological:      Comments: intubated   Psychiatric:      Comments: Intubated        Past Medical History:     Past Medical History:   Diagnosis Date    Allergic rhinitis     Anxiety     MR (mental retardation)     Respiratory arrest ([facility] [address] 2/11/2023    Sleep difficulties        Past Surgical  History:   No past surgical history on file.     Medications:      insulin lispro  0-8 Units SubCUTAneous Q4H    [START ON 2/13/2023] moxifloxacin  400 mg IntraVENous Q24H    sodium chloride  250 mL IntraVENous Once    diphenhydrAMINE  25 mg IntraVENous q8h    sodium chloride flush  5-40 mL IntraVENous 2 times per day    enoxaparin  40 mg SubCUTAneous Daily       Social History:     Social History     Socioeconomic History    Marital status: Single     Spouse name: Not on file    Number of children: Not on file    Years of education: Not on file    Highest education level: Not on file   Occupational History    Not on file   Tobacco Use    Smoking status: Never    Smokeless tobacco: Not on file   Substance and Sexual Activity    Alcohol use: No    Drug use: No    Sexual activity: Never   Other Topics Concern    Not on file   Social History Narrative    Not on file     Social Determinants of Health     Financial Resource Strain: Not on file   Food Insecurity: Not on file   Transportation Needs: Not on file   Physical Activity: Not on file   Stress: Not on file   Social Connections: Not on file   Intimate Partner Violence: Not on file   Housing Stability: Not on file       Family History:     Family History   Problem Relation Age of Onset    Coronary Art Dis Father     High Blood Pressure Father     High Cholesterol Father     Cancer Paternal Grandfather         colon      Medical Decision Making:   I have independently reviewed/ordered the following labs:    CBC with Differential:   Recent Labs     02/12/23  0041 02/12/23  0636   WBC 31.8* 28.2*   HGB 14.3 14.2   HCT 41.6 40.9    Platelet clumps present, count appears adequate. LYMPHOPCT 7* 2*   MONOPCT 4 2     BMP:  Recent Labs     02/12/23  0636 02/12/23  1414    138   K 4.0 3.9    101   CO2 16* 19*   BUN 29* 39*   CREATININE 2.03* 2.64*     Hepatic Function Panel:   Recent Labs     02/11/23  1602 02/12/23  0152   PROT 6.7 5.9*   LABALBU 3.0* 2.6*   BILITOT 0.6 2.0*   ALKPHOS 130* 218*   ALT 29 1,667*   AST 29 2,578*     No results for input(s): RPR in the last 72 hours. No results for input(s): HIV in the last 72 hours. No results for input(s): BC in the last 72 hours. Lab Results   Component Value Date/Time    CREATININE 2.64 02/12/2023 02:14 PM    GLUCOSE 107 02/12/2023 02:14 PM       Detailed results: Thank you for allowing us to participate in the care of this patient. Please call with questions. This note is created with the assistance of a speech recognition program.  While intending to generate adocument that actually reflects the content of the visit, the document can still have some errors including those of syntax and sound a like substitutions which may escape proof reading. It such instances, actual meaningcan be extrapolated by contextual diversion.     Margaret Ybarra MD  Office: [phone]  Perfect serve / office [phone]

## 2023-02-13 NOTE — PROGRESS NOTES
[address] [facility] [address]   Patient Death Note  DEATH   Shift date: 2023    Shift day: Monday  Shift #: 1                 Room # 3024/3024-01   Name: Ese Tan            Age: 32 y.o. Gender: male          Catholic: Other      Place of Alevism: Unknown  Admit Date & Time: 2023  8:18 PM     Referral: Nurse David Daniels  Actual date of death: 2023   TOD: 13:41       SITUATION AT DEATH:  Respiratory Arrest which led to Cardiac Arrest.    IS THIS A 'S CASE? Per release of body form it is a 's case. SPIRITUAL/EMOTIONAL INTERVENTION:   met with family prior to the honor walk.  met with mother, Regina Jin ([phone]); patient's sister, Gregory Garrison ([phone]); sister, Jake Nicole ([phone]) and brother-in-law, Isabella Mata ([phone]). Family were having a difficult time, and yet wanted a  to say prayers over the patient who would later . The  followed up with request from family and provided end of life prayers for the patient. The family were appreciative of the visitation and the prayers.  then during the honor walk helped escort family of patient with [employer] representative named Ritu Culver to the OR. The patient passed away shortly after. The patient is donating his heart, lungs, and kidneys. When organ donation completed,  helped escort family of the patient to the parking garage. Family Received Grief Packet? Yes       NAME AND PHONE NUMBER OF DOCTOR SIGNING DEATH CERTIFICATE: Per the nurse named David Daniels, the Doctor Tarik House [phone] will be signing the death certificate. Copy of COMPLETED Release of Body Form Received? Yes    Patient's belongings: With family.  HOME:  Name: [facility]  Phone Number: [phone]    NEXT OF KIN:  Name: Regina Jin  Relationship: Mother  Street Address: [address] [place] [address]. City: [place]  State:  [place]  Zip code:  [id number]  Phone Number: (c) [phone]; (h) [phone]    ANY FOLLOW-UP NEEDED? None    IF SO, WHAT?   None    Electronically signed by Samuel Romo, on 2/13/2023 at 4:03 PM.  [facility]  [phone]

## 2023-02-13 NOTE — CARE COORDINATION
Transitional planning. [employer] [facility] Donation determination/ testing today. Possible allocation tomorrow.

## 2023-02-13 NOTE — PROGRESS NOTES
The transport originated from [facility] Pt. was transported to CT. Assisting with the transport was Rn x1 And RTx1. Appropriate devices were applied to monitor the patient's condition during transport. Patient transported  via 30% O2 via ventilator. Patient tolerated well.         Maya Regan RCP  8:44 PM

## 2023-02-13 NOTE — PROGRESS NOTES
[address] [facility] [address]  PROGRESS NOTE    Shift date: 2/13/2023  Shift day: Monday   Shift # 1    Room # 3024/3024-01   Name: Linh Sebastian                Muslim: Unknown   Place of Restorationism: Unknown    Referral: Routine Visit    Admit Date & Time: 2/11/2023  8:18 PM    Assessment:  Linh Sebastian is a 32 y.o. male in the hospital because respiratory arrest. Upon entering the room writer observes patient is intubated and unresponsive. Patient's mother and sister are standing beside bed. Intervention:  Writer introduced self and title as  Writer offered space for family  to express feelings, needs, and concerns and provided a ministry presence. Family says that patient had been to the ED several times not feeling well. Everything took a turn for the worse in the last week. Patient had started to behave in ways that were uncharacteristic. Patient's family says they are holding things together as best as possible. They are there for each other. Outcome:   provided a empathetic listening ear and prayed with family. Family was grateful for the  and the care they are receiving. Plan:  Chaplains will remain available to offer spiritual and emotional support as needed. Electronically signed by Quique Rivas Resident, on 2/13/2023 at 11:00 AM.  [facility]  [phone]       02/13/23 1059   Encounter Summary   Service Provided For: Family; Patient not available   Referral/Consult From: Other    Support System Parent; Family members   Last Encounter  02/13/23   Complexity of Encounter Moderate   Begin Time 1015   End Time  1045   Total Time Calculated 30 min   Assessment/Intervention/Outcome   Assessment Coping;Calm; Concerns with suffering;Tearful   Intervention Active listening;Sustaining Presence/Ministry of presence;Prayer (assurance of)/Kimberly   Outcome Expressed Gratitude;Engaged in conversation; Connection/Belonging

## 2023-02-13 NOTE — CONSULTS
[facility] -   Infectious diseases evaluation  admission date 2/11/2023    reason for consultation:   sepsis    Impression :   Current:  Anaphylactic shock after amoxicillin  Respiratory and cardiac arrest, intubated  Bilateral lower lobe atelectasis/aspiration pneumonia  Bandemia   Other:  Down syndrome  Discussion / summary of stay / plan of care     Recommendations   Anaphylaxis to amoxicillin, completely deferred the beta-lactam family at this point  Keep Avelox to cover pneumococcus as well as aspiration  Pend sp cx  Follow with you    Infection Control Recommendations   Rankin Precautions  Contact Isolation       Antimicrobial Stewardship Recommendations   Simplification of therapy  Targeted therapy      History of Present Illness:   Initial history:  Ese Tan is a 32y.o.-year-old male with Down syndrome, was at home and was very short of breath altered mentation, EMS called he was cyanotic then respiratory arrest, CPR, 10 minutes to ROSC,  Unstable narrow complex tachycardia blood pressure of 5 0, given amiodarone and intubated. Since admission to the ER with sinusitis was treated with amoxicillin and Decadron. According to the family, as soon as he took the amoxicillin he turned blue and had difficulty breathing and this is when they called the EMS    Patient placed on FiO2 of 100 and PEEP of 5  Transferred after that to [address]    WBC elevated likely from steroids  Surgery consulted and planning LTME  Chest x-ray on 2/12 shows patchy bilateral infrahilar opacities, seems to have increased on the left    CT head diffuse anoxic brain injury. 2/12  temp not well controlled and hypothermic  Not responding   Sp small  FIo2 30 and peep 8      Interval changes  2/12/2023   Patient Vitals for the past 8 hrs:   BP Temp Temp src Pulse Resp SpO2   02/12/23 2130 -- 97 °F (36.1 °C) Bladder 89 16 96 %   02/12/23 2115 -- -- -- 92 16 97 %   02/12/23 2100 -- -- -- (!) 101 16 96 %   02/12/23 2045 -- -- -- 95 16 96 %   02/12/23 2000 98/76 99 °F (37.2 °C) -- 97 16 96 %   02/12/23 1945 -- -- -- 97 16 96 %   02/12/23 1941 -- -- -- 98 16 96 %   02/12/23 1930 -- -- -- 99 16 98 %   02/12/23 1915 -- -- -- 97 16 96 %   02/12/23 1900 -- -- -- 95 16 96 %   02/12/23 1845 -- -- -- 95 16 96 %   02/12/23 1830 -- -- -- (!) 101 16 95 %   02/12/23 1815 -- -- -- (!) 104 16 96 %   02/12/23 1800 -- (!) 100.9 °F (38.3 °C) Bladder (!) 112 18 95 %   02/12/23 1745 -- -- -- (!) 114 17 95 %   02/12/23 1730 -- -- -- (!) 118 16 95 %   02/12/23 1715 -- -- -- (!) 119 19 95 %   02/12/23 1700 -- -- -- (!) 118 18 96 %   02/12/23 1600 107/86 (!) 101.8 °F (38.8 °C) Bladder (!) 120 18 97 %   02/12/23 1521 -- -- -- (!) 119 16 97 %   02/12/23 1500 -- -- -- (!) 116 18 97 %         Summary of relevant labs:  Labs:  WBC  24 -31 - 28  Micro:  Sp cx GPR 2/12  BC 2/12  Nasal MRSA neg 2/11  Resp PCR panel neg   U cx neg 2/11    Imaging:  Chest x-ray 2/12 patchy bilateral infrahilar opacities left more than the right    2/11 CT scan of the chest.  No PE, pulmonary infiltrate bilateral lower lobes with atelectasis, focal right perihilar infiltrate      I have personally reviewed the past medical history, past surgical history, medications, social history, and family history, and I haveupdated the database accordingly. Allergies:   Amoxicillin, Bactrim [sulfamethoxazole-trimethoprim], and Clindamycin/lincomycin     Review of Systems:     Review of Systems   Unable to perform ROS: Intubated     Physical Examination :       Physical Exam  Constitutional:       General: He is not in acute distress. Appearance: He is ill-appearing. HENT:      Head: Normocephalic and atraumatic. Nose: Nose normal.      Mouth/Throat:      Mouth: Mucous membranes are moist.   Eyes:      General: No scleral icterus. Cardiovascular:      Rate and Rhythm: Normal rate and regular rhythm. Heart sounds: Normal heart sounds.  No murmur heard. Pulmonary:      Breath sounds: No stridor. No rhonchi. Abdominal:      Palpations: There is no mass. Hernia: No hernia is present. Genitourinary:     Comments: No cheng  Musculoskeletal:         General: No swelling or deformity. Cervical back: Neck supple. No rigidity. Skin:     Coloration: Skin is not jaundiced. Findings: No bruising, erythema or rash. Neurological:      Comments: intubated   Psychiatric:      Comments: Intubated        Past Medical History:     Past Medical History:   Diagnosis Date    Allergic rhinitis     Anxiety     MR (mental retardation)     Respiratory arrest ([facility] [address] 2/11/2023    Sleep difficulties        Past Surgical  History:   No past surgical history on file.     Medications:      insulin lispro  0-8 Units SubCUTAneous Q4H    [START ON 2/13/2023] moxifloxacin  400 mg IntraVENous Q24H    sodium chloride  250 mL IntraVENous Once    pantoprazole (PROTONIX) 40 mg injection  40 mg IntraVENous Q12H    diphenhydrAMINE  25 mg IntraVENous q8h    sodium chloride flush  5-40 mL IntraVENous 2 times per day    enoxaparin  40 mg SubCUTAneous Daily       Social History:     Social History     Socioeconomic History    Marital status: Single     Spouse name: Not on file    Number of children: Not on file    Years of education: Not on file    Highest education level: Not on file   Occupational History    Not on file   Tobacco Use    Smoking status: Never    Smokeless tobacco: Not on file   Substance and Sexual Activity    Alcohol use: No    Drug use: No    Sexual activity: Never   Other Topics Concern    Not on file   Social History Narrative    Not on file     Social Determinants of Health     Financial Resource Strain: Not on file   Food Insecurity: Not on file   Transportation Needs: Not on file   Physical Activity: Not on file   Stress: Not on file   Social Connections: Not on file   Intimate Partner Violence: Not on file   Housing Stability: Not on file Family History:     Family History   Problem Relation Age of Onset    Coronary Art Dis Father     High Blood Pressure Father     High Cholesterol Father     Cancer Paternal Grandfather         colon      Medical Decision Making:   I have independently reviewed/ordered the following labs:    CBC with Differential:   Recent Labs     02/12/23  0041 02/12/23  0636   WBC 31.8* 28.2*   HGB 14.3 14.2   HCT 41.6 40.9    Platelet clumps present, count appears adequate. LYMPHOPCT 7* 2*   MONOPCT 4 2       BMP:  Recent Labs     02/12/23  1414 02/12/23  1816    140   K 3.9 3.9    102   CO2 19* 18*   BUN 39* 45*   CREATININE 2.64* 2.80*       Hepatic Function Panel:   Recent Labs     02/12/23  0152 02/12/23  1816   PROT 5.9* 4.8*   LABALBU 2.6* 2.1*   BILITOT 2.0* 3.1*   ALKPHOS 218* 431*   ALT 1,667* 5,371*   AST 2,578* >7,000*       No results for input(s): RPR in the last 72 hours. No results for input(s): HIV in the last 72 hours. No results for input(s): BC in the last 72 hours. Lab Results   Component Value Date/Time    CREATININE 2.80 02/12/2023 06:16 PM    GLUCOSE 106 02/12/2023 06:16 PM       Detailed results: Thank you for allowing us to participate in the care of this patient. Please call with questions. This note is created with the assistance of a speech recognition program.  While intending to generate adocument that actually reflects the content of the visit, the document can still have some errors including those of syntax and sound a like substitutions which may escape proof reading. It such instances, actual meaningcan be extrapolated by contextual diversion.     Maria Eugenia Amezquita MD  Office: [phone]  Perfect serve / office [phone]

## 2023-02-13 NOTE — PLAN OF CARE
Problem: Discharge Planning  Goal: Discharge to home or other facility with appropriate resources  2/13/2023 1048 by Jerl Siemens, RN  Outcome: Progressing  2/13/2023 0130 by Di Jones RN  Outcome: Progressing     Problem: Pain  Goal: Verbalizes/displays adequate comfort level or baseline comfort level  2/13/2023 1048 by Jerl Siemens, RN  Outcome: Progressing  2/13/2023 0130 by Di Jones RN  Outcome: Progressing     Problem: Confusion  Goal: Confusion, delirium, dementia, or psychosis is improved or at baseline  Description: INTERVENTIONS:  1. Assess for possible contributors to thought disturbance, including medications, impaired vision or hearing, underlying metabolic abnormalities, dehydration, psychiatric diagnoses, and notify attending LIP  2. Leopold high risk fall precautions, as indicated  3. Provide frequent short contacts to provide reality reorientation, refocusing and direction  4. Decrease environmental stimuli, including noise as appropriate  5. Monitor and intervene to maintain adequate nutrition, hydration, elimination, sleep and activity  6. If unable to ensure safety without constant attention obtain sitter and review sitter guidelines with assigned personnel  7. Initiate Psychosocial CNS and Spiritual Care consult, as indicated  2/13/2023 1048 by Jerl Siemens, RN  Outcome: Progressing  2/13/2023 0130 by Di Jones RN  Outcome: Progressing     Problem: Nutrition Deficit:  Goal: Optimize nutritional status  2/13/2023 1048 by Jerl Siemens, RN  Outcome: Progressing  2/13/2023 0130 by Di Jones RN  Outcome: Progressing     Problem: Skin/Tissue Integrity  Goal: Absence of new skin breakdown  Description: 1. Monitor for areas of redness and/or skin breakdown  2. Assess vascular access sites hourly  3. Every 4-6 hours minimum:  Change oxygen saturation probe site  4.   Every 4-6 hours:  If on nasal continuous positive airway pressure, respiratory therapy assess nares and determine need for appliance change or resting period.   2/13/2023 1048 by Sendy Suárez RN  Outcome: Progressing  2/13/2023 0130 by Bettina Rodriguez RN  Outcome: Progressing

## 2023-02-13 NOTE — PROGRESS NOTES
Neuro Critical Care Progress Note    Reason for Consult:  Post cardiac arrest  Requesting Physician: Dr. Frances Spencer  Attending Physician: Dr. Eva Damico    History Obtained From:  [facility] record    CHIEF COMPLAINT:       Post cardiac arrest    HISTORY OF PRESENT ILLNESS:       The patient is a 32 y.o. male with history of down syndrome who presents as a transfer from [facility] for cardiac arrest. Patient reportedly had been sick with upper respiratory symptoms for the past week, was prescribed amoxicillin and decadron and discharged home. After taking his first dose of amoxicillin, patient developed difficulty breathing and altered mentation. Upon EMS arrival, he was found cyanotic with pulse and bag ventilation was started. En route to the ER, patient lost pulses, unclear initial rhythm. CPR was done for ? 20 minutes with ROSC achieved. He then went into an unstable wide complex tachycardia with SBP 50s. 150 mg amiodarone administered and patient was subsequently intubated. Started on an epinephrine infusion and transferred to [place] [id number]. Admitted to MICU, started on antibiotics and steroids pneumonia. Epinephrine infusion discontinued, started on bicarb infusion. Off sedation since 7 am yesterday. 2/12: On exam, patient open eyes spontaneously, dysconjugate gaze. Does not track or follow commands. Pupils equal and reactive. Absent corneals, cough/gag. No movement to noxious stimulation. 2/13: Overnight, patient had acute pupillary change with right pupil 3 mm and left 5 mm. Right was initially reactive, left was not. Stat CTH obtained and showed diffuse anoxic brain injury. Pupils then became bilaterally 5-6 mm and unreactive. On my exam this am, patient has no corneal, cough, gag or response to noxious stimuli. LTME flat.      PAST MEDICAL HISTORY :       Past Medical History:        Diagnosis Date    Allergic rhinitis     Anxiety     MR (mental retardation)     [facility] 2/11/2023    Sleep difficulties        Past Surgical History:    No past surgical history on file. Social History:   Social History     Socioeconomic History    Marital status: Single     Spouse name: Not on file    Number of children: Not on file    Years of education: Not on file    Highest education level: Not on file   Occupational History    Not on file   Tobacco Use    Smoking status: Never    Smokeless tobacco: Not on file   Substance and Sexual Activity    Alcohol use: No    Drug use: No    Sexual activity: Never   Other Topics Concern    Not on file   Social History Narrative    Not on file     Social Determinants of Health     Financial Resource Strain: Not on file   Food Insecurity: Not on file   Transportation Needs: Not on file   Physical Activity: Not on file   Stress: Not on file   Social Connections: Not on file   Intimate Partner Violence: Not on file   Housing Stability: Not on file       Family History:       Problem Relation Age of Onset    Coronary Art Dis Father     High Blood Pressure Father     High Cholesterol Father     Cancer Paternal Grandfather         colon       Allergies:  Amoxicillin, Bactrim [sulfamethoxazole-trimethoprim], and Clindamycin/lincomycin    Home Medications:  Prior to Admission medications    Medication Sig Start Date End Date Taking?  Authorizing Provider   Cholecalciferol (VITAMIN D3 PO) Take by mouth daily   Yes Historical Provider, MD   atorvastatin (LIPITOR) 20 MG tablet Take 20 mg by mouth daily    Historical Provider, MD   Polyethylene Glycol 3350 (MIRALAX PO) Take by mouth as needed    Historical Provider, MD   cloNIDine (CATAPRES) 0.2 MG tablet Take 1 tablet by mouth 2 times daily  Patient taking differently: Take 0.2 mg by mouth daily 5/8/15   Blayne Lock MD   loratadine (CLARITIN) 10 MG tablet Take 1 tablet by mouth daily 5/8/15   Blayne Lock MD   fluticasone (FLONASE) 50 MCG/ACT nasal spray 1 spray by Nasal route daily 5/8/15   Edson Jenkins MD   erythromycin with ethanol (EMGEL) 2 % gel Apply topically daily.  11/4/14   Edson Jenkins MD       Current Medications:   Current Facility-Administered Medications: norepinephrine (LEVOPHED) 16 mg in sodium chloride 0.9 % 250 mL infusion, 2-100 mcg/min, IntraVENous, Continuous  fentaNYL (SUBLIMAZE) injection 50 mcg, 50 mcg, IntraVENous, Q1H PRN  glucose chewable tablet 16 g, 4 tablet, Oral, PRN  dextrose bolus 10% 125 mL, 125 mL, IntraVENous, PRN **OR** dextrose bolus 10% 250 mL, 250 mL, IntraVENous, PRN  glucagon (rDNA) injection 1 mg, 1 mg, SubCUTAneous, PRN  dextrose 10 % infusion, , IntraVENous, Continuous PRN  insulin lispro (HUMALOG) injection vial 0-8 Units, 0-8 Units, SubCUTAneous, Q4H  lubrifresh P.M. (artificial tears) ophthalmic ointment, , Both Eyes, PRN  moxifloxacin (AVELOX) IVPB 400 mg, 400 mg, IntraVENous, Q24H  sodium bicarbonate 75 mEq in dextrose 5 % and 0.45 % NaCl 1,000 mL infusion, , IntraVENous, Continuous  0.9 % sodium chloride bolus, 250 mL, IntraVENous, Once  pantoprazole (PROTONIX) 40 mg in sodium chloride (PF) 0.9 % 10 mL injection, 40 mg, IntraVENous, Q12H  propofol injection, 5-50 mcg/kg/min, IntraVENous, Continuous  diphenhydrAMINE (BENADRYL) injection 25 mg, 25 mg, IntraVENous, q8h  sodium chloride flush 0.9 % injection 5-40 mL, 5-40 mL, IntraVENous, 2 times per day  sodium chloride flush 0.9 % injection 5-40 mL, 5-40 mL, IntraVENous, PRN  0.9 % sodium chloride infusion, , IntraVENous, PRN  enoxaparin (LOVENOX) injection 40 mg, 40 mg, SubCUTAneous, Daily  ondansetron (ZOFRAN-ODT) disintegrating tablet 4 mg, 4 mg, Oral, Q8H PRN **OR** ondansetron (ZOFRAN) injection 4 mg, 4 mg, IntraVENous, Q6H PRN  polyethylene glycol (GLYCOLAX) packet 17 g, 17 g, Oral, Daily PRN  potassium chloride 20 mEq/50 mL IVPB (Central Line), 20 mEq, IntraVENous, PRN **OR** potassium chloride 10 mEq/100 mL IVPB (Peripheral Line), 10 mEq, IntraVENous, PRN  magnesium sulfate 2000 mg in 50 mL IVPB premix, 2,000 mg, IntraVENous, PRN  sodium phosphate 10 mmol in sodium chloride 0.9 % 250 mL IVPB, 10 mmol, IntraVENous, PRN **OR** sodium phosphate 15 mmol in sodium chloride 0.9 % 250 mL IVPB, 15 mmol, IntraVENous, PRN **OR** sodium phosphate 20 mmol in sodium chloride 0.9 % 500 mL IVPB, 20 mmol, IntraVENous, PRN    REVIEW OF SYSTEMS:       Unable to assess due to current condition    PHYSICAL EXAM:       /82   Pulse (!) 101   Temp 100.2 °F (37.9 °C) (Bladder)   Resp 16   Ht 5' 7.01\" (1.702 m)   Wt 207 lb 14.3 oz (94.3 kg)   SpO2 95%   BMI 32.55 kg/m²       CONSTITUTIONAL:  Intubated, off sedation,  Does not track or follow commands. HEAD:  normocephalic, atraumtic   EYES:  Pupils 6 mm unreactive   ENT:  moist mucous membranes   NECK:  supple, symmetric   LUNGS:  Equal air entry bilaterally, course breath sounds   CARDIOVASCULAR:  normal s1 / s2, tachycardic    ABDOMEN:  Soft, no rigidity   NEUROLOGIC:  Mental Status:  Intubated, off sedation, no eye opening, no spontaneous movement             Cranial Nerves:    III: Pupils:  fixed and dilated   V: Corneal reflex:  completed.   abnormal absent bilaterally  Absent cough/gag    Motor Exam:    No movement to noxious stimulation   SKIN:  no rash        LABS AND IMAGING:     CBC with Differential:    Lab Results   Component Value Date/Time    WBC 37.5 02/13/2023 06:17 AM    RBC 4.55 02/13/2023 06:17 AM    HGB 13.9 02/13/2023 06:17 AM    HCT 40.0 02/13/2023 06:17 AM     02/13/2023 06:17 AM    MCV 87.9 02/13/2023 06:17 AM    MCH 30.5 02/13/2023 06:17 AM    MCHC 34.8 02/13/2023 06:17 AM    RDW 14.6 02/13/2023 06:17 AM    NRBC 2 02/13/2023 06:17 AM    LYMPHOPCT 4 02/13/2023 06:17 AM    MONOPCT 1 02/13/2023 06:17 AM    BASOPCT 0 02/13/2023 06:17 AM    MONOSABS 0.38 02/13/2023 06:17 AM    LYMPHSABS 1.50 02/13/2023 06:17 AM    EOSABS 0.00 02/13/2023 06:17 AM    BASOSABS 0.00 02/13/2023 06:17 AM    DIFFTYPE NOT REPORTED 04/25/2019 10:39 AM     BMP:    Lab Results   Component Value Date/Time     02/13/2023 06:17 AM    K 4.0 02/13/2023 06:17 AM     02/13/2023 06:17 AM    CO2 23 02/13/2023 06:17 AM    BUN 56 02/13/2023 06:17 AM    LABALBU 2.1 02/13/2023 06:17 AM    CREATININE 3.42 02/13/2023 06:17 AM    CALCIUM 5.6 02/13/2023 06:17 AM    GFRAA >60 03/22/2022 11:46 AM    LABGLOM 24 02/13/2023 06:17 AM    GLUCOSE 132 02/13/2023 06:17 AM       Radiology Review:    CT HEAD WO CONTRAST   Final Result   Addendum (preliminary) 1 of 1   ADDENDUM:   Results conveyed to Dr. Grady Baugh at 9:08 pm on 2/12/2023. Final   CT findings most consistent with diffuse anoxic brain injury. The findings were sent to the Radiology Results [address] at 9:04   pm on 2/12/2023 to be communicated to a licensed caregiver. XR CHEST (SINGLE VIEW FRONTAL)   Final Result   1. Interval placement of right IJ approach central line, tip overlying the   SVC region. No evidence of pneumothorax. 2. Patchy bilateral infrahilar opacities. Similar on the right and   intervally worse on the left. XR ABDOMEN FOR NG/OG/NE TUBE PLACEMENT   Final Result   Enteric tube in appropriate position. CT CHEST PULMONARY EMBOLISM W CONTRAST   Final Result   No evidence of pulmonary embolism or acute aortic disease. Pulmonary   infiltrates in both lower lobes with atelectatic changes. Focal right   perihilar right upper lobe infiltrate. Mild pericardial effusion. Mild right hilar lymphadenopathy. Life support system appear stable with endotracheal and nasogastric tubes in   good position. XR CHEST PORTABLE   Final Result   Endotracheal tube is barely above the mika. NG tube is in the stomach but   the side hole is not seen.          MRI BRAIN WO CONTRAST    (Results Pending)           ASSESSMENT AND PLAN:       33 yo male with history of down syndrome (unclear baseline) who presents post cardiac arrest related to anaphylactic reaction to amoxicillin. Has been ill with upper respiratory symptoms for past 5-6 days and was discharged home yesterday with amoxicillin and steroids. Shortly after receiving his first dose of antibiotics, he developed respiratory distress and AMS. Upon EMS arrival, patient was cyanotic and lost pulses shortly thereafter. 20 minutes of CPR with ROSC. Followed by unstable narrow complex rhythm, given 150 mg amiodarone. Transferred from [facility] ED and admitted to MICU. Neuro ICU admit for neuro prognostication. Encephalopathy  Poor neurological examination, pupils now fixed and dilated, guarded prognosis  CTH last night with diffuse anoxic brain injury  LTME  NSE @ 24,48,72 hours  MRI at 72 hours      Cardiac arrest secondary to respiratory failure related to anaphylactic reaction to amoxicillin  Unclear initial rhythm  Elevated troponin    Respiratory arrest   Intubated,ventted  CTPE: no evidence of pulmonary embolism or acute aortic disease. Pulmonary   infiltrates in both lower lobes with atelectatic changes. Focal right perihilar right upper lobe infiltrate. Drug-induced anaphylaxis, initial encounter  Anaphylactic reactions to amoxicillin. Epi drip is discontinued. Solu-Medrol 40 mg every 8 hours - dced  Benadryl 25 mg every 8 hours. Sepsis  Cultures sent  On moxifloxicin  Febrile, recommend to maintain euthermia    SAMUEL and shock liver      We will continue to follow along. For any changes in exam or patient status please contact Neuro Critical Care.       Jarrett Rivera,   Neuro Critical Care  2/13/2023     7:38 AM

## 2023-02-13 NOTE — PROCEDURES
LONG-TERM EEG-VIDEO [id number] [address]    Patient: Demetrius Boogie  Age: 32 y.o. MRN: [id number]    Referring Physician: No ref. provider found  History: The patient is a 32 y.o. male who presented breakthrough seizure/encephalopathy. This long-term video-EEG monitoring study was performed to determine the nature of the patient's clinical events. The patient is on neuroactive medications.    Demetrius Boogie   Current Facility-Administered Medications   Medication Dose Route Frequency Provider Last Rate Last Admin    norepinephrine (LEVOPHED) 16 mg in sodium chloride 0.9 % 250 mL infusion  2-100 mcg/min IntraVENous Continuous Sen Burleson MD   Stopped at 02/12/23 2239    fentaNYL (SUBLIMAZE) injection 50 mcg  50 mcg IntraVENous Q1H PRN Rohini Damon MD   50 mcg at 02/12/23 0626    glucose chewable tablet 16 g  4 tablet Oral PRN Sary Thayer MD        dextrose bolus 10% 125 mL  125 mL IntraVENous PRN Sary Thayer MD        Or    dextrose bolus 10% 250 mL  250 mL IntraVENous PRN Sary Thayer MD        glucagon (rDNA) injection 1 mg  1 mg SubCUTAneous PRN Sary Thayer MD        dextrose 10 % infusion   IntraVENous Continuous PRN Sary Thayer MD        insulin lispro (HUMALOG) injection vial 0-8 Units  0-8 Units SubCUTAneous Q4H Carolyn Cabral MD        lubrifresh P.M. (artificial tears) ophthalmic ointment   Both Eyes PRN Carolyn Cabral MD   Given at 02/12/23 1941    moxifloxacin (AVELOX) IVPB 400 mg  400 mg IntraVENous Q24H Rohini Damon MD   Stopped at 02/13/23 0710    sodium bicarbonate 75 mEq in dextrose 5 % and 0.45 % NaCl 1,000 mL infusion   IntraVENous Continuous Kartik Gutierrez  mL/hr at 02/13/23 0213 New Bag at 02/13/23 0213    0.9 % sodium chloride bolus  250 mL IntraVENous Once Willey Halsted, MD   Stopped at 02/12/23 1843    pantoprazole (PROTONIX) 40 mg in sodium chloride (PF) 0.9 % 10 mL injection 40 mg IntraVENous Q12H Marli Phillips MD   40 mg at 02/12/23 2251    propofol injection  5-50 mcg/kg/min IntraVENous Continuous Steven Viera MD   Stopped at 02/12/23 0704    diphenhydrAMINE (BENADRYL) injection 25 mg  25 mg IntraVENous q8h Marli Phillips MD   25 mg at 02/13/23 0553    sodium chloride flush 0.9 % injection 5-40 mL  5-40 mL IntraVENous 2 times per day Marli Phillips MD   10 mL at 02/12/23 1941    sodium chloride flush 0.9 % injection 5-40 mL  5-40 mL IntraVENous PRN Marli Phillips MD        0.9 % sodium chloride infusion   IntraVENous PRN Marli Phillips MD   Stopped at 02/12/23 2003    enoxaparin (LOVENOX) injection 40 mg  40 mg SubCUTAneous Daily Marli Phillips MD   40 mg at 02/12/23 1038    ondansetron (ZOFRAN-ODT) disintegrating tablet 4 mg  4 mg Oral Q8H PRN Marli Phillips MD        Or    ondansetron (ZOFRAN) injection 4 mg  4 mg IntraVENous Q6H PRN Marli Phillips MD        polyethylene glycol (GLYCOLAX) packet 17 g  17 g Oral Daily PRN Marli Phillips MD        potassium chloride 20 mEq/50 mL IVPB (Central Line)  20 mEq IntraVENous PRN Marli Phillips MD        Or    potassium chloride 10 mEq/100 mL IVPB (Peripheral Line)  10 mEq IntraVENous PRN Marli Phillips MD        magnesium sulfate 2000 mg in 50 mL IVPB premix  2,000 mg IntraVENous PRN Marli Phillips MD        sodium phosphate 10 mmol in sodium chloride 0.9 % 250 mL IVPB  10 mmol IntraVENous PRN Marli Phillips MD        Or    sodium phosphate 15 mmol in sodium chloride 0.9 % 250 mL IVPB  15 mmol IntraVENous PRN Marli Phillips MD        Or    sodium phosphate 20 mmol in sodium chloride 0.9 % 500 mL IVPB  20 mmol IntraVENous PRN Marli Phillips MD         Technical Description: This is a 21-channel digital EEG recording with time-locked video. Electrodes were placed in accordance with the 10-20 International System of Electrode Placement. Single lead EKG monitoring was included. Baseline EEG Recording:  A formal baseline EEG recording was not obtained.      Day 1 - 2/13/23, starting at 1621    Interictal EEG Samples: The background activity during maximal stimulation consisted of prolonged periods of diffuse attenuation lasting for 35 to 40 seconds followed by diffuse bursts of 3 to 4 Hz of polymorphic delta activity of 35 to 45 µV. The background did not show any state change or reactivity. Normal sleep architecture was not seen. The EKG channel revealed no abnormalities. Ictal EEG Recording / Patient Events: During this period the patient had no events or seizures. Summary: During this day of recording no events were recorded. The interictal EEG was severely abnormal due to prolonged periods of diffuse attenuation followed by brief burst suggesting diffuse cortical dysfunction. This finding could also be from sedation medication effect. Monitoring was continued in order to record the patient's typical events. The EKG channel revealed no abnormalities. Day 2 - 2/13/23, reviewed through 7:30 am Remainder of the EEG will be reviewed by my colleague Dr. Rachel Carver. Interictal EEG Samples: Previously seen diffuse bursts of cortical activity was not seen on today's recording. No clear discernible cerebral activity was seen on today's recording. Ictal EEG Recording / Patient Events: During this period the patient had no events or seizures. Summary: During this day of recording no events were recorded. The interictal EEG was severely abnormal due to absence of discernible cerebral activity suggesting diffuse cortical dysfunction. This finding could also be from sedation medication effect. Monitoring was continued in order to record the patients typical events. The EKG channel revealed no abnormalities.     Laury Cunningham MD  Diplomate, American Board of Psychiatry and Neurology  Diplomate, American Board of Clinical Neurophysiology  Diplomate, American Board of Epilepsy

## 2023-02-13 NOTE — PROGRESS NOTES
SPIRITUAL CARE DEPARTMENT - [facility]  PROGRESS NOTE    Shift date: 2/13/2023  Shift day: Monday   Shift # 1    Room # 3024/3024-01   Name: Kera Vazquez                Temple: Unknown   Place of [place]: Unknown    Referral:  Delivering Grief Packet    Admit Date & Time: 2/11/2023  8:18 PM    Assessment:  Kera Vazquez is a 32 y.o. male in the hospital because respiratory arrest. Patient is now connected with [facility] for organ donations. Intervention:   met with family to deliver the grief packet and to be a sustaining presence for the family in their grief. Outcome:  Family thanked the  for being there. Plan:  Chaplains will remain available to offer spiritual and emotional support as needed. Electronically signed by Henrik Romo, on 2/13/2023 at 4:00 PM.  [facility]  [phone]       02/13/23 1558   Encounter Summary   Service Provided For: Family   Referral/Consult From: Other (comment)  (Delivering Grief Packet)   Support System Parent; Family members   Last Encounter  02/13/23   Complexity of Encounter Moderate   Begin Time 1515   End Time  1545   Total Time Calculated 30 min   Assessment/Intervention/Outcome   Assessment Tearful;Coping;Complicated grieving   Intervention Sustaining Presence/Ministry of presence; Active listening   Outcome Expressed Gratitude; Connection/Belonging;Grieving;Comfort

## 2023-02-13 NOTE — PLAN OF CARE
Long discussion was had with patient's family including his mother and siblings. Results of last nights CT scan as well as his LTME were discussed with them. His neurologic exam was also explained to be non reassuring. It was discussed that the patient's injuries unfortunately catastrophic and he will not be able to recover from them, given that his imaging and exam is consistent with brain herniation. All questions and concerns were addressed. Family made an intention to withdraw care and elected to make patient comfort care at this time. Primary team updated and code status was changed to DNR comfort care.      Electronically signed by Glen Valdes DO on 2/13/2023 at 12:44 PM

## 2023-02-13 NOTE — CONSULTS
Palliative Care Inpatient Consult    NAME:  Elena Cisneros RECORD NUMBER:  [id number]  AGE: 32 y.o. GENDER: male  : 1995  TODAY'S DATE:  2023    Reasons for Consultation:    Symptom and/or pain management  Provision of information regarding PC and/or hospice philosophies  Complex, time-intensive communication and interdisciplinary psychosocial support  Clarification of goals of care and/or assistance with difficult decision-making  Guidance in regards to resources and transition(s)    Members of PC team contributing to this consultation are:  Chauncey Osorio DO - fellow  Crystal Chappell MD - attending    Plan      Palliative Interaction:  The palliative care service was able to meet with the patient and his family today. His mother Harrison Kraus and sister [employer] were at bedside. The patient is intubated and is unresponsive. After introductions, I explained my role as the palliative care team and their loved ones care. I asked if they had heard of palliative care before, and they explained that they had not. I explained that we are a service that provides support to the patient's families during difficult times. I explained that unfortunately Vicenta Perry has suffered a major health event and that we are here to provide support to the family. The patient's mother, Harrison Kraus, explains that the family is holding up okay at this time. [employer] states that they are trying to support one another. Harrison Kraus explains that a physician has told them that the patient has minimal brain activity at this time. They state that he is totally unresponsive. I acknowledged this and again expressed my condolences to the current situation. Currently, neuro critical care is prognosticating based on LTME and MRI findings. I was in contact with [facility].   We will continue to follow and support the patient's family throughout hospitalization will be available to assist in any complex medical decision making. Education/support to family  Communications with primary service  Providing support for coping/adaptation/distress of family    Additional Assessments:     1- Symptom management/ pain control     Pain Assessment:  The patient is not having any pain. Anxiety:  none                          Dyspnea: Currently intubated                          Fatigue: None       We feel the patient symptoms are being controlled. His current regimen is reviewed by myself and discussed with the staff. We recommend adjusting his medications as follows: No changes from a palliative care perspective    2- Goals of care evaluation   The patient goals of care are support for family/caregiver   Goals of care discussed with:    [] Patient independently    [] Patient and Family    [x] Family or Healthcare DPOA independently    [] Unable to discuss with patient, family/DPOA not present    3- Code Status  Full Code    4- Other recommendations   - We will continue to provide comfort and support to the patient and the family      Palliative Care will continue to follow Mr. Omi Saunders care as needed. Thank you for allowing Palliative Care to participate in the care of Mr. Josefina Olmedo . History of Present Illness     The patient is a 32 y.o. male who initially presented to [facility] ED for evaluation of a cough and nasal congestion. His comorbidities include trisomy 24. He was discharged home on amoxicillin with return to ED paperwork. He was noted to be altered and with difficulty breathing and EMS was called. He was cyanotic and eventually lost pulses. CPR was started. ROSC was achieved. He was subsequently transferred to [address] [place] [address] for further treatment. Downtime was approximately 10 minutes. He was admitted to the medical ICU for further treatment. Neuro-critical care was consulted on 2/12. Patient was connected to [facility]. He has a concerning neurologic examination.  Overnight on 2/12 - 2/13, the patient had an acute change in his pupillary response. CT of the head revealed findings compatible with a diffuse anoxic brain injury. LTME noted to be severely abnormal.    Palliative care has been consulted to assist with goals of care and family support. Referred to Palliative Care by   [x] Physician      Active Hospital Problems    Diagnosis Date Noted    Respiratory arrest ([facility] [R09.2] 02/11/2023     Priority: High    Cardiac arrest due to respiratory disorder ([facility] [J98.9, I46.8] 02/11/2023     Priority: High    Acute respiratory failure with hypoxia and hypercapnia (HCC) [J96.01, J96.02] 02/12/2023     Priority: Medium    Lactic acidosis [E87.20] 02/12/2023     Priority: Medium    Encephalopathy acute [G93.40] 02/12/2023     Priority: Medium    SAMUEL (acute kidney injury) ([facility] [address] [N17.9] 02/12/2023     Priority: Medium    Multifocal pneumonia [J18.9] 02/12/2023     Priority: Medium    Aspiration pneumonia of both lungs ([facility] [address] [J69.0] 02/12/2023     Priority: Medium    Bandemia [D72.825] 02/12/2023     Priority: Medium    Anaphylactic shock [facility] 2XXA] 02/11/2023     Priority: Medium       PAST MEDICAL HISTORY      Diagnosis Date    Allergic rhinitis     Anxiety     MR (mental retardation)     Respiratory arrest ([facility] [address] 2/11/2023    Sleep difficulties        PAST SURGICAL HISTORY  No past surgical history on file.     SOCIAL HISTORY  Social History     Tobacco Use    Smoking status: Never   Substance Use Topics    Alcohol use: No    Drug use: No       FAMILY HISTORY  Family History   Problem Relation Age of Onset    Coronary Art Dis Father     High Blood Pressure Father     High Cholesterol Father     Cancer Paternal Grandfather         colon       ALLERGIES  Allergies   Allergen Reactions    Amoxicillin Anaphylaxis    Bactrim [Sulfamethoxazole-Trimethoprim]     Clindamycin/Lincomycin        MEDICATIONS  Current Medications    insulin lispro  0-8 Units SubCUTAneous Q4H    moxifloxacin  400 mg IntraVENous Q24H    sodium chloride  250 mL IntraVENous Once    pantoprazole (PROTONIX) 40 mg injection  40 mg IntraVENous Q12H    diphenhydrAMINE  25 mg IntraVENous q8h    sodium chloride flush  5-40 mL IntraVENous 2 times per day    enoxaparin  40 mg SubCUTAneous Daily     fentanNYL, glucose, dextrose bolus **OR** dextrose bolus, glucagon (rDNA), dextrose, artificial tears, sodium chloride flush, sodium chloride, ondansetron **OR** ondansetron, polyethylene glycol, potassium chloride **OR** potassium chloride, magnesium sulfate, sodium phosphate IVPB **OR** sodium phosphate IVPB **OR** sodium phosphate IVPB  Home Medications  No current facility-administered medications on file prior to encounter. Current Outpatient Medications on File Prior to Encounter   Medication Sig Dispense Refill    Cholecalciferol (VITAMIN D3 PO) Take by mouth daily      atorvastatin (LIPITOR) 20 MG tablet Take 20 mg by mouth daily      Polyethylene Glycol 3350 (MIRALAX PO) Take by mouth as needed      cloNIDine (CATAPRES) 0.2 MG tablet Take 1 tablet by mouth 2 times daily (Patient taking differently: Take 0.2 mg by mouth daily) 180 tablet 3    loratadine (CLARITIN) 10 MG tablet Take 1 tablet by mouth daily 90 tablet 3    fluticasone (FLONASE) 50 MCG/ACT nasal spray 1 spray by Nasal route daily 1 Bottle 3    erythromycin with ethanol (EMGEL) 2 % gel Apply topically daily.  1 Tube 3       Data         /82   Pulse (!) 108   Temp 100.2 °F (37.9 °C) (Bladder)   Resp 16   Ht 5' 7.01\" (1.702 m)   Wt 207 lb 14.3 oz (94.3 kg)   SpO2 95%   BMI 32.55 kg/m²     Wt Readings from Last 3 Encounters:   02/13/23 207 lb 14.3 oz (94.3 kg)   02/11/23 207 lb (93.9 kg)   07/09/17 221 lb (100.2 kg)        Code Status: Full Code     ADVANCED CARE PLANNING:  Patient has capacity for medical decisions: no  Health Care Power of : no, but his mother would be decision-maker  Living Will: no     Personal, Social, and Family History  Marital Status: single  Living situation: with family:  mother  Does patient understand diagnosis/treatment? no  Does caregiver understand diagnosis/treatment? yes    Assessment        REVIEW OF SYSTEMS  Unable to obtain as patient is intubated and unresponsive. PHYSICAL ASSESSMENT:  General Appearance: Intubated, unresponsive. Mental status: unable to obtain  Head: normocephalic, atraumatic  Eye: no icterus, redness, pupils are not reactive, conjunctiva clear  Ear: normal external ear, no discharge  Nose: no drainage noted  Mouth: mucous membranes moist  Neck: supple, right IJ CVC present  Lungs: Bilateral equal air entry, clear to ausculation, no wheezing, rales or rhonchi, normal effort, mechanical breath sounds  Cardiovascular: Tachycardic rate, regular rhythm, no murmur, gallop, rub  Abdomen: Soft, nontender, nondistended, normal bowel sounds  Neurologic: Not withdrawing to noxious stimuli in either upper or lower extremity bilaterally  Skin: No gross lesions, rashes, bruising or bleeding on exposed skin area  Extremities: peripheral pulses palpable, no pedal edema  Psych: unable to obtain    Palliative Performance Scale:  ___100% Full ambulation; normal activity/No disease; full self-care; normal intake; LOC full  ___90% full ambulation; normal activity/some disease; full self-care; normal intake; LOC full  ___80% ambulation full; normal activity with effort/some disease; full self-care; normal/reduced intake; LOC full  ___70% ambulation reduced; cannot do normal work/some disease; full self-care; normal/reduce intake; LOC full  ___60%  Ambulation reduced; Significant disease; Can't do hobbies/housework; intake normal or reduced; occasional assist; LOC full/confusion  ___50%  Mainly sit/lie;  Extensive disease; Can't do any work; Considerable assist; intake normal or reduced; LOC full/confusion  ___40%  Mainly in bed; Extensive disease; Mainly assist; intake normal or reduced; LOC full/confusion   ___30%  Bed Bound; Extensive disease; Total care; intake reduced; LOC full/confusion  _x_20%  Bed Bound; Extensive disease; Total care; intake minimal; Drowsy/coma  ___10%  Bed Bound; Extensive disease; Total care; Mouth care only; Drowsy/coma  ___0       Death    Principle Problem/Diagnosis:  Principal Problem:    Respiratory arrest ([facility]  Active Problems:    Cardiac arrest due to respiratory disorder (HCC)    Anaphylactic shock    Acute respiratory failure with hypoxia and hypercapnia (HCC)    Lactic acidosis    Encephalopathy acute    SAMUEL (acute kidney injury) ([facility] [address]    Multifocal pneumonia    Aspiration pneumonia of both lungs ([facility] [address]    Bandemia  Resolved Problems:    * No resolved hospital problems. *      Please call with any palliative questions or concerns. Palliative Care Team is available via perfect serve or via phone. This note has been dictated by dragon, typing errors may be a possibility. The total time I spent in seeing the patient, discussing goals of care, advanced directives, code status, greater than 50% time in counseling and other major issues was more than 50 minutes      Electronically signed by      Omar TolbertGe[place], [place]  2/13/2023 9:43 AM  Palliative care office: [phone]  Attending Physician Statement  The patient was evaluated . I have discussed the care of the patient, including pertinent history and exam findings with the palliative care team. The patient was independently seen and examined.  The note above was reviewed and modified and reflects my evaluation  Additional assessment/ plan        Nazario Bai MD  [address] [place] [address]   [phone]

## 2023-02-13 NOTE — PLAN OF CARE
Problem: Discharge Planning  Goal: Discharge to home or other facility with appropriate resources  Outcome: Progressing     Problem: Pain  Goal: Verbalizes/displays adequate comfort level or baseline comfort level  Outcome: Progressing     Problem: Confusion  Goal: Confusion, delirium, dementia, or psychosis is improved or at baseline  Description: INTERVENTIONS:  1. Assess for possible contributors to thought disturbance, including medications, impaired vision or hearing, underlying metabolic abnormalities, dehydration, psychiatric diagnoses, and notify attending LIP  2. [place] high risk fall precautions, as indicated  3. Provide frequent short contacts to provide reality reorientation, refocusing and direction  4. Decrease environmental stimuli, including noise as appropriate  5. Monitor and intervene to maintain adequate nutrition, hydration, elimination, sleep and activity  6. If unable to ensure safety without constant attention obtain sitter and review sitter guidelines with assigned personnel  7. Initiate Psychosocial CNS and Spiritual Care consult, as indicated  Outcome: Progressing     Problem: Nutrition Deficit:  Goal: Optimize nutritional status  Outcome: Progressing     Problem: Skin/Tissue Integrity  Goal: Absence of new skin breakdown  Description: 1. Monitor for areas of redness and/or skin breakdown  2. Assess vascular access sites hourly  3. Every 4-6 hours minimum:  Change oxygen saturation probe site  4. Every 4-6 hours:  If on nasal continuous positive airway pressure, respiratory therapy assess nares and determine need for appliance change or resting period.   Outcome: Progressing

## 2023-02-13 NOTE — PROGRESS NOTES
INTENSIVE CARE UNIT  Resident Physician Progress Note    Patient - Kandi Castellon  Date of Admission -  2/11/2023  8:18 PM  Date of Evaluation -  2/13/2023  Room and Bed Number -  3024/3024-01   Hospital Day - 2      SUBJECTIVE:     OVERNIGHT EVENTS: CT was obtained last night which showed severe anoxic brain injury, EEG was also obtained that showed lack of any cortical function suggestive of severe anoxic brain injury. [place] on the case. Not requiring any sedation. HOSPITAL COURSE:   Kandi Castellon is a 32 y.o. with PMH of   -Down syndrome     Patient was brought in to the emergency department of [facility] by EMS. Per EMS report, they were called to the residence for difficulty breathing and altered mental status. Patient was found cyanotic and bag ventilation was started. He lost pulse and compression were started on his way to the ER. On arrival to the emergency department he was found pale, unresponsive and was in respiratory arrest.  CPR was done for 10 minutes and ROSC was achieved. Patient went into unstable narrow complex tachycardia with blood pressure in 50s.  150 mg amiodarone was given and patient was subsequently intubated. He also received epi x3 and was started on epi drip. Of note, he was recently discharged from the emergency department with sinusitis treated with Decadron and sent on amoxicillin. Per family conversation soon after taking his amoxicillin he turns blue and was noted to have difficulty breathing. 911 was then notified. Upon admission, pt was on ventilator AC/VC RR 26//PEEP 5 FiO2 100% sedated with propofol 15 mcg and on epi drip. Admission VBG showed 7.1 1/57/44/17. Blood pressure 115/67, MAP 80, heart rate 85. Labs showed: Anion gap metabolic acidosis bicarbonate 17, anion gap 31. BUN 13, creatinine 1.42. Troponin 16->> 137. proBNP 921. WBC 35.4, hemoglobin 13.9. Platelet 989. CT PE is done in the emergency department.   EKG showed normal sinus rhythm. No acute ST-T wave changes. Pt is transferred to the [facility] [address] for further management. REVIEW OF SYSTEMS:  Review of SystemsIntubated, sedated       OBJECTIVE:     VITAL SIGNS:  /82   Pulse (!) 108   Temp 100.2 °F (37.9 °C) (Bladder)   Resp 16   Ht 5' 7.01\" (1.702 m)   Wt 207 lb 14.3 oz (94.3 kg)   SpO2 95%   BMI 32.55 kg/m²   Tmax over 24 hours:  Temp (24hrs), Av.6 °F (37.6 °C), Min:96.1 °F (35.6 °C), Max:101.8 °F (38.8 °C)      Patient Vitals for the past 8 hrs:   BP Temp Temp src Pulse Resp SpO2 Weight   23 0835 -- -- -- (!) 108 16 95 % --   23 0700 -- -- -- (!) 101 16 95 % --   23 0630 -- -- -- 100 16 94 % --   23 0600 -- 100.2 °F (37.9 °C) Bladder (!) 127 16 95 % 207 lb 14.3 oz (94.3 kg)   23 0530 -- -- -- (!) 121 16 95 % --   23 0500 -- -- -- (!) 118 16 95 % --   23 0430 -- -- -- (!) 116 16 95 % --   23 0400 139/82 99.5 °F (37.5 °C) Bladder (!) 113 16 95 % --   23 0333 -- -- -- (!) 112 16 94 % --   23 0300 -- -- -- (!) 110 16 95 % --   23 0200 -- 99.5 °F (37.5 °C) Bladder (!) 105 16 94 % --           Intake/Output Summary (Last 24 hours) at 2023 0900  Last data filed at 2023 0757  Gross per 24 hour   Intake 2957.77 ml   Output 1290 ml   Net 1667.77 ml       Date 23 0000 - 23 2359   Shift 8541-2424 9555-3713 2672-8687 24 Hour Total   INTAKE   I.V.(mL/kg) 607. 1(6.4)   607. 1(6.4)   Shift Total(mL/kg) 607. 1(6.4)   607. 1(6.4)   OUTPUT   Urine(mL/kg/hr) 325(0.4)   325   Emesis/NG output(mL/kg) 0(0)   0(0)   Shift Total(mL/kg) 325(3.4)   325(3.4)   Weight (kg) 94.3 94.3 94.3 94.3       Wt Readings from Last 3 Encounters:   23 207 lb 14.3 oz (94.3 kg)   23 207 lb (93.9 kg)   17 221 lb (100.2 kg)     Body mass index is 32.55 kg/m². PHYSICAL EXAM:  Physical Exam  Constitutional:       Comments: Intubated    Cardiovascular:      Rate and Rhythm: Regular rhythm. Tachycardia present. Pulses: Normal pulses. Pulmonary:      Comments: On vent, bl breath sounds present  Abdominal:      General: There is no distension. Tenderness: There is no abdominal tenderness. Skin:     Comments: Cool extremities    Neurological:      Comments: Pupils sluggish, no gag or corneal, does not withdraw to pain, grimacing, makes no purposeful movements, not requiring any sedation          MEDICATIONS:  Scheduled Meds:   insulin lispro  0-8 Units SubCUTAneous Q4H    moxifloxacin  400 mg IntraVENous Q24H    sodium chloride  250 mL IntraVENous Once    pantoprazole (PROTONIX) 40 mg injection  40 mg IntraVENous Q12H    diphenhydrAMINE  25 mg IntraVENous q8h    sodium chloride flush  5-40 mL IntraVENous 2 times per day    enoxaparin  40 mg SubCUTAneous Daily     Continuous Infusions:   norepinephrine Stopped (02/12/23 2239)    dextrose      sodium bicarbonate infusion 150 mL/hr at 02/13/23 0853    propofol Stopped (02/12/23 0704)    sodium chloride 10 mL/hr at 02/13/23 0856     PRN Meds:   fentanNYL, 50 mcg, Q1H PRN  glucose, 4 tablet, PRN  dextrose bolus, 125 mL, PRN   Or  dextrose bolus, 250 mL, PRN  glucagon (rDNA), 1 mg, PRN  dextrose, , Continuous PRN  artificial tears, , PRN  sodium chloride flush, 5-40 mL, PRN  sodium chloride, , PRN  ondansetron, 4 mg, Q8H PRN   Or  ondansetron, 4 mg, Q6H PRN  polyethylene glycol, 17 g, Daily PRN  potassium chloride, 20 mEq, PRN   Or  potassium chloride, 10 mEq, PRN  magnesium sulfate, 2,000 mg, PRN  sodium phosphate IVPB, 10 mmol, PRN   Or  sodium phosphate IVPB, 15 mmol, PRN   Or  sodium phosphate IVPB, 20 mmol, PRN      SUPPORT DEVICES: [x] Ventilator     VENT SETTINGS (Comprehensive) (if applicable):   PRVC mode, FiO2 30%, PEEP 5, Respiratory Rate 16, Tidal Volume 530  Vent Information  Ventilator ID: [id number]  Equipment Changed: HME  Vent Mode: AC/PRVC  Additional Respiratory Assessments  Heart Rate: (!) 108  Resp: 16  SpO2: 95 %  End Tidal CO2: 36 (%)  Position: Semi-Cano's  Humidification Source: E    ABGs:   Arterial Blood Gas result:  7.427, 29.0, 84.1, 19.1   Lab Results   Component Value Date/Time    FIO2 30.0 02/13/2023 04:42 AM         DATA:  Complete Blood Count:   Recent Labs     02/11/23 2025 02/12/23  0041 02/12/23  0636 02/13/23  0617   WBC 39.4* 31.8* 28.2* 37.5*   RBC 4.48 4.64 4.62 4.55   HGB 13.9 14.3 14.2 13.9   HCT 42.4 41.6 40.9 40.0*   MCV 94.6 89.7 88.5 87.9   MCH 31.0 30.8 30.7 30.5   MCHC 32.8 34.4 34.7 34.8   RDW 14.6* 14.6* 14.6* 14.6*    341 Platelet clumps present, count appears adequate. 187   MPV 10.2 10.0  --  10.4          Last 3 Blood Glucose:   Recent Labs     02/11/23  1602 02/11/23  1812 02/12/23  0152 02/12/23  0636 02/12/23  1414 02/12/23  1816 02/13/23  0617   GLUCOSE 164* 158 208* 202* 107* 106* 132*          PT/INR:    Lab Results   Component Value Date/Time    PROTIME 22.5 02/13/2023 06:17 AM    INR 2.2 02/13/2023 06:17 AM     PTT:  No results found for: APTT, PTT    Comprehensive Metabolic Profile:   Recent Labs     02/12/23  0152 02/12/23  0636 02/12/23  1414 02/12/23  1816 02/13/23  0617   *   < > 138 140 142   K 4.5   < > 3.9 3.9 4.0   CL 99   < > 101 102 102   CO2 17*   < > 19* 18* 23   BUN 24*   < > 39* 45* 56*   CREATININE 1.92*   < > 2.64* 2.80* 3.42*   GLUCOSE 208*   < > 107* 106* 132*   CALCIUM 7.0*   < > 6.4* 6.0* 5.6*   PROT 5.9*  --   --  4.8* 4.6*   LABALBU 2.6*  --   --  2.1* 2.1*   BILITOT 2.0*  --   --  3.1* 2.7*   ALKPHOS 218*  --   --  431* 441*   AST 2,578*  --   --  >7,000* >7,000*   ALT 1,667*  --   --  5,371* >7,000*    < > = values in this interval not displayed.         Magnesium: No results found for: MG  Phosphorus: No results found for: PHOS  Ionized Calcium:   Lab Results   Component Value Date/Time    CAION 0.77 02/13/2023 06:17 AM        Urinalysis:   Lab Results   Component Value Date/Time    NITRU NEGATIVE 02/11/2023 04:04 PM    COLORU Yellow 02/11/2023 04:04 PM PHUR 6.0 02/11/2023 04:04 PM    WBCUA 5 TO 10 02/11/2023 04:04 PM    RBCUA 5 TO 10 02/11/2023 04:04 PM    MUCUS NOT REPORTED 11/15/2021 08:02 AM    TRICHOMONAS NOT REPORTED 11/15/2021 08:02 AM    YEAST NOT REPORTED 11/15/2021 08:02 AM    BACTERIA 3+ 02/11/2023 04:04 PM    SPECGRAV >1.030 02/11/2023 04:04 PM    LEUKOCYTESUR NEGATIVE 02/11/2023 04:04 PM    UROBILINOGEN ELEVATED 02/11/2023 04:04 PM    BILIRUBINUR MODERATE 02/11/2023 04:04 PM    GLUCOSEU NEGATIVE 02/11/2023 04:04 PM    KETUA 4+ 02/11/2023 04:04 PM    AMORPHOUS NOT REPORTED 11/15/2021 08:02 AM       HgBA1c:  No results found for: LABA1C  TSH:    Lab Results   Component Value Date/Time    TSH 2.59 02/11/2023 08:25 PM     Lactic Acid:   Lab Results   Component Value Date/Time    LACTA 23.9 02/11/2023 04:02 PM      Troponin: No results for input(s): TROPONINI in the last 72 hours. Radiology/Imaging:  CT head showing findings most consistent with diffuse anoxic brain injury. There is loss of gray-white matter differentiation, slitlike ventricles, diffuse effacement of the sulci and near complete effacement of the cisterns. Chest x-ray yesterday showed patchy bilateral infrahilar opacities similar on the right and intervally worse on the left.     ASSESSMENT:     Patient Active Problem List    Diagnosis Date Noted    Respiratory arrest ([address] 02/11/2023    Cardiac arrest due to respiratory disorder ([address] 02/11/2023    Acute respiratory failure with hypoxia and hypercapnia (HCC) 02/12/2023    Lactic acidosis 02/12/2023    Encephalopathy acute 02/12/2023    SAMUEL (acute kidney injury) ([address] 02/12/2023    Multifocal pneumonia 02/12/2023    Aspiration pneumonia of both lungs ([address] 02/12/2023    Bandemia 02/12/2023    Anaphylactic shock 02/11/2023    Social anxiety disorder of childhood 08/14/2014    Development disorder, mixed 08/14/2014    Obesity 08/14/2014          PLAN:       HOME MEDS RECONCILED: Yes     CONSULTATION NEEDED:  Cardiology     FAMILY UPDATED:    Will today    TRANSFER OUT OF ICU:   No         Additional Assessment:  Principal Problem:    Respiratory arrest ([facility]  Active Problems:    Cardiac arrest due to respiratory disorder (HCC)    Anaphylactic shock    Acute respiratory failure with hypoxia and hypercapnia (HCC)    Lactic acidosis    Encephalopathy acute    SAMUEL (acute kidney injury) ([facility]    Multifocal pneumonia    Aspiration pneumonia of both lungs (HCC)    Bandemia  Resolved Problems:    * No resolved hospital problems. *       Anoxic brain injury  Neuro critical care consulted on the case  EEG did not show any cortical activity  CT head showed loss of gray-white differentiation and effacement of the sulci  We will have CODE STATUS meeting with family today. Respiratory arrest [facility]):  Secondary to anaphylactic reaction from amoxicillin. Patient is intubated. On FiO2 30%, PEEP 5, Respiratory Rate 16, Tidal Volume 530  Patient has increasing opacities concerning for aspiration pneumonia versus aspiration pneumonitis, on moxifloxacin, dosed by pharmacy      Cardiac arrest due to respiratory disorder ([facility]  Status post CPR, epi x3 and amiodarone 150 mg. Currently in normal sinus rhythm. Drug-induced anaphylaxis, initial encounter  Anaphylactic reactions to amoxicillin. Suspected    Sepsis:  Secondary to aspiration pneumonia versus pharyngitis versus viral pharyngitis  ID consulted  On moxifloxacin  Not requiring any pressors    SAMUEL  BUN/Cr : 56/3.42  Anion GAP closed  Electrolytes otherwise stable aside from Calcium of 5.6     Combined metabolic and respiratory acidosis: We will start on bicarbonate drip 150 mEq in D5 at 100 cc/h. Elevated Liver enzymes:  Secondary to shock liver. Will repeat LFTs tomorrow. ALT and AST both greater than 7000     History of Down syndrome:  Baseline mentation unknown. We will order echocardiography. DVT prophylaxis: Lovenox 40 mg daily. GI prophylaxis: Not needed.     Regis Pulido MD  EM Resident PGY-3  Intensive Care Unit  2/13/2023 9:00 AM

## 2023-02-13 NOTE — PROGRESS NOTES
SPIRITUAL CARE DEPARTMENT - [facility] [address]  PROGRESS NOTE    Shift date: 2/13/2023  Shift day: Monday   Shift # 1    Room # 3024/3024-01   Name: Chaparro Ellison                Synagogue: Unknown   Place of Rastafari: Unkown    Referral:  Nurse    Admit Date & Time: 2/11/2023  8:18 PM    Assessment:  Chaparro Ellison is a 32 y.o. male in the hospital because respiratory arrest. Upon entering the room writer observes family is around the bed of patient, grieving. Intervention:  Writer introduced self and title as    Family is grieving.  asks about the news. Family shares that patient has been pronounced brain dead. Patient had an allergic reaction to antibiotic according to family sharing what had happened. Family was tearful as they shared stories of patient. Outcome:   prayed with family. Family thanked  for being there. Plan:  Chaplains will remain available to offer spiritual and emotional support as needed. Electronically signed by Ricardo Romo, on 2/13/2023 at 12:46 PM.  [facility]  [phone]       02/13/23 1243   Encounter Summary   Service Provided For: Family   Referral/Consult From: Nurse   Support System Parent; Family members   Last Encounter  02/13/23   Complexity of Encounter High   Begin Time 1215   End Time  1230   Total Time Calculated 15 min   Grief, Loss, and Adjustments   Type New Diagnosis;Grief and loss; Other (Comment)  (Brain death)   Assessment/Intervention/Outcome   Assessment Complicated grieving;Tearful   Intervention Active listening;Sustaining Presence/Ministry of presence;Prayer (assurance of)/[place]   Outcome Expressed Gratitude;Grieving

## 2023-02-14 NOTE — PROGRESS NOTES
Neuro Critical Care Progress Note    Reason for Consult:  Post cardiac arrest  Requesting Physician: Dr. Keila Agrawal  Attending Physician: Dr. Darrick Johnson    History Obtained From:  [facility] record    CHIEF COMPLAINT:       Post cardiac arrest    HISTORY OF PRESENT ILLNESS:       The patient is a 32 y.o. male with history of down syndrome who presents as a transfer from [facility] for cardiac arrest. Patient reportedly had been sick with upper respiratory symptoms for the past week, was prescribed amoxicillin and decadron and discharged home. After taking his first dose of amoxicillin, patient developed difficulty breathing and altered mentation. Upon EMS arrival, he was found cyanotic with pulse and bag ventilation was started. En route to the ER, patient lost pulses, unclear initial rhythm. CPR was done for ? 20 minutes with ROSC achieved. He then went into an unstable wide complex tachycardia with SBP 50s. 150 mg amiodarone administered and patient was subsequently intubated. Started on an epinephrine infusion and transferred to [place] [id number]. Admitted to MICU, started on antibiotics and steroids pneumonia. Epinephrine infusion discontinued, started on bicarb infusion. Off sedation since 7 am yesterday. 2/12: On exam, patient open eyes spontaneously, dysconjugate gaze. Does not track or follow commands. Pupils equal and reactive. Absent corneals, cough/gag. No movement to noxious stimulation. 2/13: Overnight, patient had acute pupillary change with right pupil 3 mm and left 5 mm. Right was initially reactive, left was not. Stat CTH obtained and showed diffuse anoxic brain injury. Pupils then became bilaterally 5-6 mm and unreactive. On my exam this am, patient has no corneal, cough, gag or response to noxious stimuli. LTME flat.      2/14: No changes overnight, patients pupils are 7mm and non-reactive to light, no cough, gag, or corneal reflexes present, patients family elected change code status to [facility]. PAST MEDICAL HISTORY :       Past Medical History:        Diagnosis Date    Allergic rhinitis     Anxiety     MR (mental retardation)     Respiratory arrest ([facility] [address] 2/11/2023    Sleep difficulties        Past Surgical History:    No past surgical history on file. Social History:   Social History     Socioeconomic History    Marital status: Single     Spouse name: Not on file    Number of children: Not on file    Years of education: Not on file    Highest education level: Not on file   Occupational History    Not on file   Tobacco Use    Smoking status: Never    Smokeless tobacco: Not on file   Substance and Sexual Activity    Alcohol use: No    Drug use: No    Sexual activity: Never   Other Topics Concern    Not on file   Social History Narrative    Not on file     Social Determinants of Health     Financial Resource Strain: Not on file   Food Insecurity: Not on file   Transportation Needs: Not on file   Physical Activity: Not on file   Stress: Not on file   Social Connections: Not on file   Intimate Partner Violence: Not on file   Housing Stability: Not on file       Family History:       Problem Relation Age of Onset    Coronary Art Dis Father     High Blood Pressure Father     High Cholesterol Father     Cancer Paternal Grandfather         colon       Allergies:  Amoxicillin, Bactrim [sulfamethoxazole-trimethoprim], and Clindamycin/lincomycin    Home Medications:  Prior to Admission medications    Medication Sig Start Date End Date Taking?  Authorizing Provider   Cholecalciferol (VITAMIN D3 PO) Take by mouth daily   Yes Historical Provider, MD   atorvastatin (LIPITOR) 20 MG tablet Take 20 mg by mouth daily    Historical Provider, MD   Polyethylene Glycol 3350 (MIRALAX PO) Take by mouth as needed    Historical Provider, MD   cloNIDine (CATAPRES) 0.2 MG tablet Take 1 tablet by mouth 2 times daily  Patient taking differently: Take 0.2 mg by mouth daily 5/8/15 Mickey Ronquillo MD   loratadine (CLARITIN) 10 MG tablet Take 1 tablet by mouth daily 5/8/15   Mickey Ronquillo MD   fluticasone (FLONASE) 50 MCG/ACT nasal spray 1 spray by Nasal route daily 5/8/15   Mickey Ronquillo MD   erythromycin with ethanol (EMGEL) 2 % gel Apply topically daily.  11/4/14   Mickey Ronquillo MD       Current Medications:   Current Facility-Administered Medications: aztreonam (AZACTAM) 1,000 mg in sodium chloride 0.9 % 50 mL IVPB (mini-bag), 1,000 mg, IntraVENous, Q12H  ipratropium-albuterol (DUONEB) nebulizer solution 1 ampule, 1 ampule, Inhalation, Q4H  sodium chloride (Inhalant) 3 % nebulizer solution 4 mL, 4 mL, Nebulization, Q8H  norepinephrine (LEVOPHED) 16 mg in sodium chloride 0.9 % 250 mL infusion, 2-100 mcg/min, IntraVENous, Continuous  fentaNYL (SUBLIMAZE) injection 50 mcg, 50 mcg, IntraVENous, Q1H PRN  glucose chewable tablet 16 g, 4 tablet, Oral, PRN  dextrose bolus 10% 125 mL, 125 mL, IntraVENous, PRN **OR** dextrose bolus 10% 250 mL, 250 mL, IntraVENous, PRN  glucagon (rDNA) injection 1 mg, 1 mg, SubCUTAneous, PRN  dextrose 10 % infusion, , IntraVENous, Continuous PRN  insulin lispro (HUMALOG) injection vial 0-8 Units, 0-8 Units, SubCUTAneous, Q4H  lubrifresh P.M. (artificial tears) ophthalmic ointment, , Both Eyes, PRN  moxifloxacin (AVELOX) IVPB 400 mg, 400 mg, IntraVENous, Q24H  sodium bicarbonate 75 mEq in dextrose 5 % and 0.45 % NaCl 1,000 mL infusion, , IntraVENous, Continuous  0.9 % sodium chloride bolus, 250 mL, IntraVENous, Once  pantoprazole (PROTONIX) 40 mg in sodium chloride (PF) 0.9 % 10 mL injection, 40 mg, IntraVENous, Q12H  propofol injection, 5-50 mcg/kg/min, IntraVENous, Continuous  diphenhydrAMINE (BENADRYL) injection 25 mg, 25 mg, IntraVENous, q8h  sodium chloride flush 0.9 % injection 5-40 mL, 5-40 mL, IntraVENous, 2 times per day  sodium chloride flush 0.9 % injection 5-40 mL, 5-40 mL, IntraVENous, PRN  0.9 % sodium chloride infusion, , IntraVENous, PRN  enoxaparin (LOVENOX) injection 40 mg, 40 mg, SubCUTAneous, Daily  ondansetron (ZOFRAN-ODT) disintegrating tablet 4 mg, 4 mg, Oral, Q8H PRN **OR** ondansetron (ZOFRAN) injection 4 mg, 4 mg, IntraVENous, Q6H PRN  polyethylene glycol (GLYCOLAX) packet 17 g, 17 g, Oral, Daily PRN  potassium chloride 20 mEq/50 mL IVPB (Central Line), 20 mEq, IntraVENous, PRN **OR** potassium chloride 10 mEq/100 mL IVPB (Peripheral Line), 10 mEq, IntraVENous, PRN  magnesium sulfate 2000 mg in 50 mL IVPB premix, 2,000 mg, IntraVENous, PRN  sodium phosphate 10 mmol in sodium chloride 0.9 % 250 mL IVPB, 10 mmol, IntraVENous, PRN **OR** sodium phosphate 15 mmol in sodium chloride 0.9 % 250 mL IVPB, 15 mmol, IntraVENous, PRN **OR** sodium phosphate 20 mmol in sodium chloride 0.9 % 500 mL IVPB, 20 mmol, IntraVENous, PRN    REVIEW OF SYSTEMS:       Unable to assess due to current condition    PHYSICAL EXAM:       BP (!) 147/92   Pulse (!) 104   Temp 97.5 °F (36.4 °C)   Resp 20   Ht 5' 7.01\" (1.702 m)   Wt 218 lb 0.6 oz (98.9 kg)   SpO2 97%   BMI 34.14 kg/m²       CONSTITUTIONAL:  Intubated, off sedation,  Does not track or follow commands. HEAD:  normocephalic, atraumtic   EYES:  Pupils 6 mm unreactive   ENT:  moist mucous membranes   NECK:  supple, symmetric   LUNGS:  Equal air entry bilaterally, course breath sounds   CARDIOVASCULAR:  normal s1 / s2, tachycardic    ABDOMEN:  Soft, no rigidity   NEUROLOGIC:  Mental Status:  Intubated, off sedation, no eye opening, no spontaneous movement             Cranial Nerves:    III: Pupils:  fixed and dilated   V: Corneal reflex:  completed.   abnormal absent bilaterally  Absent cough/gag    Motor Exam:    No movement to noxious stimulation   SKIN:  no rash        LABS AND IMAGING:     CBC with Differential:    Lab Results   Component Value Date/Time    WBC 35.4 02/14/2023 06:29 AM    RBC 4.08 02/14/2023 06:29 AM    HGB 12.5 02/14/2023 06:29 AM    HCT 35.9 02/14/2023 06:29 AM     02/14/2023 06:29 AM    MCV 88.0 02/14/2023 06:29 AM    MCH 30.6 02/14/2023 06:29 AM    MCHC 34.8 02/14/2023 06:29 AM    RDW 14.6 02/14/2023 06:29 AM    NRBC 1 02/13/2023 06:52 PM    LYMPHOPCT 3 02/14/2023 06:29 AM    MONOPCT 6 02/14/2023 06:29 AM    BASOPCT 0 02/14/2023 06:29 AM    MONOSABS 2.12 02/14/2023 06:29 AM    LYMPHSABS 1.06 02/14/2023 06:29 AM    EOSABS 0.00 02/14/2023 06:29 AM    BASOSABS 0.00 02/14/2023 06:29 AM    DIFFTYPE NOT REPORTED 04/25/2019 10:39 AM     BMP:    Lab Results   Component Value Date/Time     02/14/2023 06:29 AM    K 3.7 02/14/2023 06:29 AM    CL 98 02/14/2023 06:29 AM    CO2 26 02/14/2023 06:29 AM    BUN 81 02/14/2023 06:29 AM    LABALBU 2.6 02/14/2023 06:29 AM    CREATININE 5.26 02/14/2023 06:29 AM    CALCIUM 6.1 02/14/2023 06:29 AM    GFRAA >60 03/22/2022 11:46 AM    LABGLOM 14 02/14/2023 06:29 AM    GLUCOSE 155 02/14/2023 06:29 AM       Radiology Review:    XR CHEST PORTABLE   Final Result   Mild cardiomegaly with central pulmonary edema. Support tubes and catheters as noted above. CT CHEST ABDOMEN PELVIS WO CONTRAST Additional Contrast? None   Final Result   Small pericardial effusion. Moderate left and small right pleural effusion. Extensive consolidative   change of left lower lobe and moderate consolidative change of right lower   lobe likely related to atelectasis. Mild amount of ascites within the peritoneal cavity. Extensive stranding through the intraperitoneal and retroperitoneal spaces. Finding may be related to fluid overload. XR CHEST PORTABLE   Final Result   Addendum (preliminary) 1 of 1   ADDENDUM:   Addendum is being made for measurement of aortic knob white and diaphragm   width. Aortic knob width is 27 mm.  diaphragm width is 33 cm.          Final      Support lines and devices are stable and in satisfactory position      Interval worsening of focal consolidative change and ground-glass densities   within the left lower lobe and interval development of right parahilar and   infrahilar ground-glass densities. Right lung measures 19.7 x 12 cm and left lung measures 19.2x9 cm. CT HEAD WO CONTRAST   Final Result   Addendum (preliminary) 1 of 1   ADDENDUM:   Results conveyed to Dr. Cesar Sun at 9:08 pm on 2/12/2023. Final   CT findings most consistent with diffuse anoxic brain injury. The findings were sent to the Radiology Results [address] at 9:04   pm on 2/12/2023 to be communicated to a licensed caregiver. XR CHEST (SINGLE VIEW FRONTAL)   Final Result   1. Interval placement of right IJ approach central line, tip overlying the   SVC region. No evidence of pneumothorax. 2. Patchy bilateral infrahilar opacities. Similar on the right and   intervally worse on the left. XR ABDOMEN FOR NG/OG/NE TUBE PLACEMENT   Final Result   Enteric tube in appropriate position. CT CHEST PULMONARY EMBOLISM W CONTRAST   Final Result   No evidence of pulmonary embolism or acute aortic disease. Pulmonary   infiltrates in both lower lobes with atelectatic changes. Focal right   perihilar right upper lobe infiltrate. Mild pericardial effusion. Mild right hilar lymphadenopathy. Life support system appear stable with endotracheal and nasogastric tubes in   good position. XR CHEST PORTABLE   Final Result   Endotracheal tube is barely above the mika. NG tube is in the stomach but   the side hole is not seen. XR CHEST PORTABLE    (Results Pending)           ASSESSMENT AND PLAN:       31 yo male with history of down syndrome (unclear baseline) who presents post cardiac arrest related to anaphylactic reaction to amoxicillin. Has been ill with upper respiratory symptoms for past 5-6 days and was discharged home yesterday with amoxicillin and steroids. Shortly after receiving his first dose of antibiotics, he developed respiratory distress and AMS.  Upon EMS arrival, patient was cyanotic and lost pulses shortly thereafter. 20 minutes of CPR with ROSC. Followed by unstable narrow complex rhythm, given 150 mg amiodarone. Transferred from [place] [address] ED and admitted to MICU. Neuro ICU admit for neuro prognostication. Encephalopathy  Poor neurological examination, pupils fixed and dilated, absence of reflexes  CTH 2/12 with diffuse anoxic brain injury  Discontinue LTME  NSE @ 24,48,72 hours      Cardiac arrest secondary to respiratory failure related to anaphylactic reaction to amoxicillin  Unclear initial rhythm  Elevated troponin    Respiratory arrest   Intubated,ventted, off sedation   CTPE: no evidence of pulmonary embolism or acute aortic disease. Pulmonary   infiltrates in both lower lobes with atelectatic changes. Focal right perihilar right upper lobe infiltrate. Drug-induced anaphylaxis, initial encounter  Anaphylactic reactions to amoxicillin. Epi drip is discontinued. Solu-Medrol 40 mg every 8 hours - dced  Benadryl 25 mg every 8 hours. Sepsis  Cultures sent  On moxifloxicin  Febrile, recommend to maintain euthermia    SAMUEL and shock liver      We will continue to follow along. For any changes in exam or patient status please contact Neuro Critical Care.       DANNIE Pierce - CNP  Neuro Critical Care  2/14/2023     9:32 AM

## 2023-02-14 NOTE — PROGRESS NOTES
BRONCHOSCOPE RIGID INTUB 4 REG 5/2.2 MM 23.6 IN [facility]      Bedside bronchoscope performed by DOROTEO Wilcox

## 2023-02-14 NOTE — PROGRESS NOTES
[facility] -   Infectious diseases evaluation  admission date 2/11/2023    reason for consultation:   sepsis    Impression :   Current:  Anaphylactic shock after amoxicillin  Respiratory and cardiac arrest, intubated  Bilateral lower lobe atelectasis/aspiration pneumonia  Bandemia   Other:  Down syndrome  Discussion / summary of stay / plan of care     Recommendations   Anaphylaxis to amoxicillin, completely deferred the beta-lactam family at this point  Keep Avelox to cover pneumococcus as well as aspiration  DNRCCA and planned DNR CC w organ donation - disc w family    Infection Control Recommendations   [place] Precautions  Contact Isolation       Antimicrobial Stewardship Recommendations   Simplification of therapy  Targeted therapy      History of Present Illness:   Initial history:  Kamille Perez is a 32y.o.-year-old male with Down syndrome, was at home and was very short of breath altered mentation, EMS called he was cyanotic then respiratory arrest, CPR, 10 minutes to ROSC,  Unstable narrow complex tachycardia blood pressure of 5 0, given amiodarone and intubated. Since admission to the ER with sinusitis was treated with amoxicillin and Decadron. According to the family, as soon as he took the amoxicillin he turned blue and had difficulty breathing and this is when they called the EMS    Patient placed on FiO2 of 100 and PEEP of 5  Transferred after that to [address]    WBC elevated likely from steroids  Surgery consulted and planning LTME  Chest x-ray on 2/12 shows patchy bilateral infrahilar opacities, seems to have increased on the left    CT head diffuse anoxic brain injury. 2/12  temp not well controlled and hypothermic  Not responding   Sp small  FIo2 30 and peep 8      Interval changes  2/14/2023   Patient Vitals for the past 8 hrs:   Temp Pulse Resp SpO2 Weight   02/14/23 0700 98.2 °F (36.8 °C) (!) 109 20 96 % --   02/14/23 0624 -- (!) 110 20 99 % --   02/14/23 0600 98.6 °F (37 °C) (!) 110 20 99 % 218 lb 0.6 oz (98.9 kg)   02/14/23 0500 98.8 °F (37.1 °C) (!) 112 20 97 % --   02/14/23 0425 -- (!) 113 20 96 % --   02/14/23 0400 99 °F (37.2 °C) (!) 114 20 95 % --   02/14/23 0327 -- (!) 115 20 95 % --   02/14/23 0300 99.1 °F (37.3 °C) (!) 116 20 95 % --   02/14/23 0244 -- (!) 115 20 98 % --   02/14/23 0200 99.5 °F (37.5 °C) (!) 115 20 99 % --   02/14/23 0100 99.7 °F (37.6 °C) (!) 118 20 95 % --     CT anoxic brain injury  Not responding  WBC on the rise  cultures neg  Sp cx neg    2/14  LGF and still not responding on the vent  Peep  FIO2       Summary of relevant labs:  Labs:  WBC  24 -31 - 28-42-31  Micro:  Sp cx GPR 2/12  BC 2/12  Nasal MRSA neg 2/11  Resp PCR panel neg   U cx neg 2/11    Imaging:  Chest x-ray 2/12 patchy bilateral infrahilar opacities left more than the right    2/11 CT scan of the chest.  No PE, pulmonary infiltrate bilateral lower lobes with atelectasis, focal right perihilar infiltrate      I have personally reviewed the past medical history, past surgical history, medications, social history, and family history, and I haveupdated the database accordingly. Allergies:   Amoxicillin, Bactrim [sulfamethoxazole-trimethoprim], and Clindamycin/lincomycin     Review of Systems:     Review of Systems   Unable to perform ROS: Intubated     Physical Examination :       Physical Exam  Constitutional:       General: He is not in acute distress. Appearance: He is ill-appearing. HENT:      Head: Normocephalic and atraumatic. Nose: Nose normal.      Mouth/Throat:      Mouth: Mucous membranes are moist.   Eyes:      General: No scleral icterus. Cardiovascular:      Rate and Rhythm: Normal rate and regular rhythm. Heart sounds: Normal heart sounds. No murmur heard. Pulmonary:      Breath sounds: No stridor. No rhonchi. Abdominal:      General: There is no distension. Palpations: There is no mass. Tenderness:  There is no guarding. Hernia: No hernia is present. Genitourinary:     Comments: No cheng  Musculoskeletal:         General: No swelling or deformity. Cervical back: Neck supple. No rigidity. Skin:     Coloration: Skin is not jaundiced. Findings: No bruising, erythema or rash. Neurological:      Comments: intubated   Psychiatric:      Comments: Intubated        Past Medical History:     Past Medical History:   Diagnosis Date    Allergic rhinitis     Anxiety     MR (mental retardation)     Respiratory arrest ([facility] [address] 2/11/2023    Sleep difficulties        Past Surgical  History:   No past surgical history on file.     Medications:      aztreonam  1,000 mg IntraVENous Q12H    ipratropium-albuterol  1 ampule Inhalation Q4H    sodium chloride (Inhalant)  4 mL Nebulization Q8H    insulin lispro  0-8 Units SubCUTAneous Q4H    moxifloxacin  400 mg IntraVENous Q24H    sodium chloride  250 mL IntraVENous Once    pantoprazole (PROTONIX) 40 mg injection  40 mg IntraVENous Q12H    diphenhydrAMINE  25 mg IntraVENous q8h    sodium chloride flush  5-40 mL IntraVENous 2 times per day    enoxaparin  40 mg SubCUTAneous Daily       Social History:     Social History     Socioeconomic History    Marital status: Single     Spouse name: Not on file    Number of children: Not on file    Years of education: Not on file    Highest education level: Not on file   Occupational History    Not on file   Tobacco Use    Smoking status: Never    Smokeless tobacco: Not on file   Substance and Sexual Activity    Alcohol use: No    Drug use: No    Sexual activity: Never   Other Topics Concern    Not on file   Social History Narrative    Not on file     Social Determinants of Health     Financial Resource Strain: Not on file   Food Insecurity: Not on file   Transportation Needs: Not on file   Physical Activity: Not on file   Stress: Not on file   Social Connections: Not on file   Intimate Partner Violence: Not on file   Housing Stability: Not on file       Family History:     Family History   Problem Relation Age of Onset    Coronary Art Dis Father     High Blood Pressure Father     High Cholesterol Father     Cancer Paternal Grandfather         colon      Medical Decision Making:   I have independently reviewed/ordered the following labs:    CBC with Differential:   Recent Labs     02/13/23  1852 02/14/23  0629   WBC 42.5* 35.4*   HGB 13.9 12.5*   HCT 39.6* 35.9*    149   LYMPHOPCT 5* 3*   MONOPCT 3 6       BMP:  Recent Labs     02/14/23  0021 02/14/23  0629   * 143   K 3.9 3.7    98   CO2 26 26   BUN 76* 81*   CREATININE 4.79* 5.26*   MG 2.3 2.2       Hepatic Function Panel:   Recent Labs     02/13/23  0617 02/13/23 1852 02/14/23 0021 02/14/23  0629   PROT 4.6*   < > 4.9* 4.7*   LABALBU 2.1*   < > 2.8* 2.6*   BILIDIR 2.3*   < > 2.9* 2.8*   IBILI 0.4  --   --   --    BILITOT 2.7*   < > 3.6* 3.5*   ALKPHOS 441*   < > 348* 327*   ALT >7,000*   < > 5,585* 5,043*   AST >7,000*   < > 3,114* 2,060*    < > = values in this interval not displayed. No results for input(s): RPR in the last 72 hours. No results for input(s): HIV in the last 72 hours. No results for input(s): BC in the last 72 hours. Lab Results   Component Value Date/Time    CREATININE 5.26 02/14/2023 06:29 AM    GLUCOSE 155 02/14/2023 06:29 AM       Detailed results: Thank you for allowing us to participate in the care of this patient. Please call with questions. This note is created with the assistance of a speech recognition program.  While intending to generate adocument that actually reflects the content of the visit, the document can still have some errors including those of syntax and sound a like substitutions which may escape proof reading. It such instances, actual meaningcan be extrapolated by contextual diversion.     Marylen Co, MD  Office: [phone]  Perfect serve / office [phone]

## 2023-02-14 NOTE — PROGRESS NOTES
INTENSIVE CARE UNIT  Resident Physician Progress Note    Patient - Suly Edward  Date of Admission -  2023  8:18 PM  Date of Evaluation -  2023  Room and Bed Number -  3024/3024-01   Hospital Day - 3      SUBJECTIVE:     OVERNIGHT EVENTS:        Patient seen and examined at bedside. No acute events were reported overnight. Patients code status was changed yesterday to comfort care. [facility] is on the case for organ transplant, and is currently on CCA for the organ transplantation. Patient is breathing over pressure support. OBJECTIVE:     VITAL SIGNS:  BP (!) 147/92   Pulse (!) 105   Temp 97.5 °F (36.4 °C) (Bladder)   Resp 15   Ht 5' 7.01\" (1.702 m)   Wt 218 lb 0.6 oz (98.9 kg)   SpO2 95%   BMI 34.14 kg/m²   Tmax over 24 hours:  Temp (24hrs), Av.4 °F (37.4 °C), Min:97.5 °F (36.4 °C), Max:101.1 °F (38.4 °C)      Patient Vitals for the past 8 hrs:   Temp Temp src Pulse Resp SpO2 Weight   23 1154 -- -- (!) 105 15 95 % --   23 1042 -- -- (!) 103 15 100 % --   23 0900 97.5 °F (36.4 °C) Bladder (!) 104 20 97 % --   23 0857 -- -- (!) 103 20 97 % --   23 0847 -- -- (!) 104 20 97 % --   23 0800 97.9 °F (36.6 °C) Bladder (!) 106 20 96 % --   23 0700 98.2 °F (36.8 °C) -- (!) 109 20 96 % --   23 0624 -- -- (!) 110 20 99 % --   23 0600 98.6 °F (37 °C) -- (!) 110 20 99 % 218 lb 0.6 oz (98.9 kg)   23 0500 98.8 °F (37.1 °C) -- (!) 112 20 97 % --         Intake/Output Summary (Last 24 hours) at 2023 1245  Last data filed at 2023 1200  Gross per 24 hour   Intake 3245.08 ml   Output 1400 ml   Net 1845.08 ml     Date 23 0000 - 23 2359   Shift 6845-9269 1990-6934 3417-7745 24 Hour Total   INTAKE   I.V.(mL/kg) 1642. 3(16.6)   1642. 3(16.6)   IV Piggyback(mL/kg) 222.3(2.2)   222.3(2.2)   Shift Total(mL/kg) 0298. 6(18.9)   1864. 6(18.9)   OUTPUT   Urine(mL/kg/hr) 490(0.6) 255  745   Emesis/NG output(mL/kg)  100(1)  100(1) Shift Total(mL/kg) 490(5) 355(3.6)  845(8.5)   Weight (kg) 98.9 98.9 98.9 98.9     Wt Readings from Last 3 Encounters:   02/14/23 218 lb 0.6 oz (98.9 kg)   02/11/23 207 lb (93.9 kg)   07/09/17 221 lb (100.2 kg)     Body mass index is 34.14 kg/m². PHYSICAL EXAM:    Physical Exam  Constitutional:       Comments: Intubated    Cardiovascular:      Rate and Rhythm: Regular rhythm. Tachycardia present. Pulses: Normal pulses. Pulmonary:      Comments: On vent, bl breath sounds present, Patient is breathing over the vent  Abdominal:      General: There is no distension. Tenderness: There is no abdominal tenderness.    Skin:     Comments: Cool extremities    Neurological:      Comments: Pupils sluggish, no gag or corneal, does not withdraw to pain, grimacing, makes no purposeful movements, not requiring any sedation             MEDICATIONS:  Scheduled Meds:   calcium gluconate  2,000 mg IntraVENous Once    aztreonam  1,000 mg IntraVENous Q12H    ipratropium-albuterol  1 ampule Inhalation Q4H    sodium chloride (Inhalant)  4 mL Nebulization Q8H    insulin lispro  0-8 Units SubCUTAneous Q4H    moxifloxacin  400 mg IntraVENous Q24H    sodium chloride  250 mL IntraVENous Once    pantoprazole (PROTONIX) 40 mg injection  40 mg IntraVENous Q12H    diphenhydrAMINE  25 mg IntraVENous q8h    sodium chloride flush  5-40 mL IntraVENous 2 times per day    enoxaparin  40 mg SubCUTAneous Daily     Continuous Infusions:   norepinephrine Stopped (02/12/23 2239)    dextrose      sodium bicarbonate infusion 150 mL/hr at 02/14/23 0731    propofol Stopped (02/12/23 0704)    sodium chloride 10 mL/hr at 02/14/23 0616     PRN Meds:   fentanNYL, 50 mcg, Q1H PRN  glucose, 4 tablet, PRN  dextrose bolus, 125 mL, PRN   Or  dextrose bolus, 250 mL, PRN  glucagon (rDNA), 1 mg, PRN  dextrose, , Continuous PRN  artificial tears, , PRN  sodium chloride flush, 5-40 mL, PRN  sodium chloride, , PRN  ondansetron, 4 mg, Q8H PRN Or  ondansetron, 4 mg, Q6H PRN  polyethylene glycol, 17 g, Daily PRN  potassium chloride, 20 mEq, PRN   Or  potassium chloride, 10 mEq, PRN  magnesium sulfate, 2,000 mg, PRN  sodium phosphate IVPB, 10 mmol, PRN   Or  sodium phosphate IVPB, 15 mmol, PRN   Or  sodium phosphate IVPB, 20 mmol, PRN        SUPPORT DEVICES: [] Ventilator [] BIPAP  [] Nasal Cannula [] Room Air    VENT SETTINGS (Comprehensive) (if applicable): PRVC mode, FiO2 30%, PEEP 5, Respiratory Rate 15, Tidal Volume 500  Vent Information  Ventilator ID: [id number]  Equipment Changed: HME  Vent Mode: AC/PRVC  Additional Respiratory Assessments  Heart Rate: (!) 105  Resp: 15  SpO2: 95 %  End Tidal CO2: 34 (%)  Position: Semi-Cano's  Humidification Source: HME    ABGs:   Arterial Blood Gas result:  pH 7.440; pCO2 41.4; pO2 472.9; HCO3 28.1; BE4; %O2 Sat 100.   Lab Results   Component Value Date/Time    FIO2 100.0 02/14/2023 10:42 AM         DATA:  Complete Blood Count:   Recent Labs     02/13/23  0617 02/13/23 1852 02/14/23  0629   WBC 37.5* 42.5* 35.4*   RBC 4.55 4.48 4.08*   HGB 13.9 13.9 12.5*   HCT 40.0* 39.6* 35.9*   MCV 87.9 88.4 88.0   MCH 30.5 31.0 30.6   MCHC 34.8 35.1* 34.8   RDW 14.6* 14.9* 14.6*    168 149   MPV 10.4 11.2 11.5        Last 3 Blood Glucose:   Recent Labs     02/12/23  0152 02/12/23  0636 02/12/23  1414 02/12/23  1816 02/13/23  0617 02/13/23 1852 02/14/23  0021 02/14/23  0629   GLUCOSE 208* 202* 107* 106* 132* 150* 140* 155*        PT/INR:    Lab Results   Component Value Date/Time    PROTIME 19.5 02/14/2023 06:29 AM    INR 1.9 02/14/2023 06:29 AM     PTT:    Lab Results   Component Value Date/Time    APTT 27.1 02/14/2023 06:29 AM       Comprehensive Metabolic Profile:   Recent Labs     02/13/23  1852 02/14/23  0021 02/14/23  0629    146* 143   K 4.2 3.9 3.7   CL 99 100 98   CO2 23 26 26   BUN 72* 76* 81*   CREATININE 4.36* 4.79* 5.26*   GLUCOSE 150* 140* 155*   CALCIUM 5.6* 6.1* 6.1*   PROT 4.7* 4.9* 4.7*   LABALBU 2.2* 2.8* 2.6*   BILITOT 3.0* 3.6* 3.5*   ALKPHOS 428* 348* 327*   AST 4,896* 3,114* 2,060*   ALT 6,917* 5,585* 5,043*      Magnesium:   Lab Results   Component Value Date/Time    MG 2.2 02/14/2023 06:29 AM    MG 2.3 02/14/2023 12:21 AM    MG 2.3 02/13/2023 06:52 PM     Phosphorus:   Lab Results   Component Value Date/Time    PHOS 7.6 02/14/2023 06:29 AM    PHOS 7.5 02/14/2023 12:21 AM    PHOS 8.1 02/13/2023 06:52 PM     Ionized Calcium:   Lab Results   Component Value Date/Time    CAION 1.00 02/14/2023 09:58 AM    CAION 0.77 02/13/2023 06:17 AM        Urinalysis:   Lab Results   Component Value Date/Time    NITRU NEGATIVE 02/14/2023 06:29 AM    COLORU Yellow 02/14/2023 06:29 AM    PHUR 7.0 02/14/2023 06:29 AM    WBCUA 2 TO 5 02/14/2023 06:29 AM    RBCUA 5 TO 10 02/14/2023 06:29 AM    MUCUS NOT REPORTED 11/15/2021 08:02 AM    TRICHOMONAS NOT REPORTED 11/15/2021 08:02 AM    YEAST NOT REPORTED 11/15/2021 08:02 AM    BACTERIA 3+ 02/11/2023 04:04 PM    SPECGRAV 1.010 02/14/2023 06:29 AM    LEUKOCYTESUR SMALL 02/14/2023 06:29 AM    UROBILINOGEN Normal 02/14/2023 06:29 AM    BILIRUBINUR NEGATIVE 02/14/2023 06:29 AM    GLUCOSEU TRACE 02/14/2023 06:29 AM    KETUA NEGATIVE 02/14/2023 06:29 AM    AMORPHOUS NOT REPORTED 11/15/2021 08:02 AM       HgBA1c:  No results found for: LABA1C  TSH:    Lab Results   Component Value Date/Time    TSH 2.59 02/11/2023 08:25 PM     Lactic Acid:   Lab Results   Component Value Date/Time    LACTA 23.9 02/11/2023 04:02 PM      Troponin: No results for input(s): TROPONINI in the last 72 hours.     Microbiology:        Other Labs:        Radiology/Imaging:      ASSESSMENT:     Patient Active Problem List    Diagnosis Date Noted    Respiratory arrest ([facility] 02/11/2023    Cardiac arrest due to respiratory disorder ([facility] 02/11/2023    Acute respiratory failure with hypoxia and hypercapnia (HCC) 02/12/2023    Lactic acidosis 02/12/2023    Encephalopathy acute 02/12/2023    SAMUEL (acute kidney injury) ([facility] 02/12/2023    Multifocal pneumonia 02/12/2023    Aspiration [facility] of both [facility] 02/12/2023    Bandemia 02/12/2023    Anaphylactic shock 02/11/2023    Social anxiety disorder of childhood 08/14/2014    Development disorder, mixed 08/14/2014    Obesity 08/14/2014          PLAN:     Anoxic brain injury  Neuro critical care consulted on the case  EEG did not show any cortical activity  CT head showed loss of gray-white differentiation and effacement of the sulci  Code status changed yesterday to [facility]  Patient is breathing over the vent, so patient doesn't meet criteria for brain death       Respiratory arrest [facility]):  Secondary to anaphylactic reaction from amoxicillin. Patient is intubated. On FiO2 30%, PEEP 5, Respiratory Rate 20, Tidal Volume 500  Patient has increasing opacities concerning for aspiration pneumonia versus aspiration pneumonitis, on moxifloxacin and aztreonam, dosed by pharmacy        Cardiac arrest due to respiratory disorder ([facility]  Status post CPR, epi x3 and amiodarone 150 mg. Currently in normal sinus rhythm. Drug-induced anaphylaxis, initial encounter  Anaphylactic reactions to amoxicillin. Suspected     Sepsis:  Secondary to aspiration pneumonia versus pharyngitis versus viral pharyngitis  ID consulted    - Have patient on aztreonam and avelox       SAMUEL  -Nephrology consulted for organ transplantation. Combined metabolic and respiratory acidosis:   bicarbonate drip 75 mEq in D5 at 150 cc/h. Elevated Liver enzymes:  Secondary to shock liver. LFTs trending down     History of Down syndrome:  Baseline mentation unknown. Echo showed EF 60%, abnormal septal motion, no significant valvular regurgitation or stenosis seen. DVT prophylaxis: Lovenox 40 mg daily.   GI prophylaxis: protonix

## 2023-02-14 NOTE — CARE COORDINATION
Transitional planning. [facility] Donation determination/ testing continues. Bronch planned for today. Possible allocation starting tonight.

## 2023-02-14 NOTE — PROCEDURES
Referring physician: Dr. Seema Solis  Date:2/14/2023  Start Time:2/13/2023 @ 0730  End Time: 2/14/2023 @ 0730    Indication  Patient with encephalopathy, EEG done to rule out subclinical seizures. Introduction  This continuous video-EEG was acquired using a [employer] workstation at 256 samples/s. Electrodes were placed according to the International 10-20 system. Automated spike and seizure detection algorithms were applied. Video was recorded during this study. Description  The back ground consistent of complete suppression pattern. No consistent focal slowing or interhemispheric asymmetry was noted. Normal sleep structures were observed. There were no interictal epileptiform discharges or electrographic seizures. Events  No events reported or recorded. Impression. Abnormal continuous vEEG recording, the slowing mentioned above suggests severe non specific encephalopathy. No epileptiform discharges were identified. Please note the absence of   such activity in this record cannot conclusively rule out an epileptic   disorder. If such is still clinically suspected, a repeat study with   prolonged sampling may be helpful. Veena Lindquist MD  Epilepsy Board Certified. Neurology Board Certified.     Electronically Signed

## 2023-02-14 NOTE — PROGRESS NOTES
[facility] -   Infectious diseases evaluation  admission date 2/11/2023    reason for consultation:   sepsis    Impression :   Current:  Anaphylactic shock after amoxicillin  Respiratory and cardiac arrest, intubated  Bilateral lower lobe atelectasis/aspiration pneumonia  Bandemia   Other:  Down syndrome  Discussion / summary of stay / plan of care     Recommendations   Anaphylaxis to amoxicillin, completely deferred the beta-lactam family at this point  Keep Avelox to cover pneumococcus as well as aspiration  DNRCCA and planned DNR CC w organ donation - disc w family    Infection Control Recommendations   [place] Precautions  Contact Isolation       Antimicrobial Stewardship Recommendations   Simplification of therapy  Targeted therapy      History of Present Illness:   Initial history:  Jennifer Freire is a 32y.o.-year-old male with Down syndrome, was at home and was very short of breath altered mentation, EMS called he was cyanotic then respiratory arrest, CPR, 10 minutes to ROSC,  Unstable narrow complex tachycardia blood pressure of 5 0, given amiodarone and intubated. Since admission to the ER with sinusitis was treated with amoxicillin and Decadron. According to the family, as soon as he took the amoxicillin he turned blue and had difficulty breathing and this is when they called the EMS    Patient placed on FiO2 of 100 and PEEP of 5  Transferred after that to [facility]    WBC elevated likely from steroids  Surgery consulted and planning LTME  Chest x-ray on 2/12 shows patchy bilateral infrahilar opacities, seems to have increased on the left    CT head diffuse anoxic brain injury. 2/12  temp not well controlled and hypothermic  Not responding   Sp small  FIo2 30 and peep 8      Interval changes  2/13/2023   Patient Vitals for the past 8 hrs:   BP Temp Temp src Pulse Resp SpO2   02/13/23 2100 -- -- -- (!) 122 20 96 %   02/13/23 2009 -- -- -- (!) 120 16 95 % 02/13/23 2000 -- (!) 100.9 °F (38.3 °C) Bladder (!) 120 16 95 %   02/13/23 1900 -- -- -- (!) 121 16 92 %   02/13/23 1826 -- -- -- (!) 121 16 95 %   02/13/23 1800 -- -- -- (!) 120 16 99 %   02/13/23 1700 -- -- -- (!) 120 16 94 %   02/13/23 1612 -- -- -- (!) 120 16 94 %   02/13/23 1600 (!) 147/92 (!) 100.9 °F (38.3 °C) Bladder (!) 120 18 94 %   02/13/23 1500 -- -- -- (!) 120 16 94 %     CT anoxic brain injury  Not responding  WBC on the rise  cultures neg  Sp cx neg    Summary of relevant labs:  Labs:  WBC  24 -31 - 28-42  Micro:  Sp cx GPR 2/12  BC 2/12  Nasal MRSA neg 2/11  Resp PCR panel neg   U cx neg 2/11    Imaging:  Chest x-ray 2/12 patchy bilateral infrahilar opacities left more than the right    2/11 CT scan of the chest.  No PE, pulmonary infiltrate bilateral lower lobes with atelectasis, focal right perihilar infiltrate      I have personally reviewed the past medical history, past surgical history, medications, social history, and family history, and I haveupdated the database accordingly. Allergies:   Amoxicillin, Bactrim [sulfamethoxazole-trimethoprim], and Clindamycin/lincomycin     Review of Systems:     Review of Systems   Unable to perform ROS: Intubated     Physical Examination :       Physical Exam  Constitutional:       General: He is not in acute distress. Appearance: He is ill-appearing. HENT:      Head: Normocephalic and atraumatic. Nose: Nose normal.      Mouth/Throat:      Mouth: Mucous membranes are moist.   Eyes:      General: No scleral icterus. Cardiovascular:      Rate and Rhythm: Normal rate and regular rhythm. Heart sounds: Normal heart sounds. No murmur heard. Pulmonary:      Breath sounds: No stridor. No rhonchi. Abdominal:      General: There is no distension. Palpations: There is no mass. Tenderness: There is no guarding. Hernia: No hernia is present.    Genitourinary:     Comments: No cheng  Musculoskeletal:         General: No swelling or deformity. Cervical back: Neck supple. No rigidity. Skin:     Coloration: Skin is not jaundiced. Findings: No bruising, erythema or rash. Neurological:      Comments: intubated   Psychiatric:      Comments: Intubated        Past Medical History:     Past Medical History:   Diagnosis Date    Allergic rhinitis     Anxiety     MR (mental retardation)     Respiratory arrest ([facility] 2/11/2023    Sleep difficulties        Past Surgical  History:   No past surgical history on file.     Medications:      aztreonam  1,000 mg IntraVENous Q12H    calcium gluconate  2,000 mg IntraVENous Once    ipratropium-albuterol  1 ampule Inhalation Q4H    [START ON 2/14/2023] sodium chloride (Inhalant)  4 mL Nebulization Q8H    insulin lispro  0-8 Units SubCUTAneous Q4H    moxifloxacin  400 mg IntraVENous Q24H    sodium chloride  250 mL IntraVENous Once    pantoprazole (PROTONIX) 40 mg injection  40 mg IntraVENous Q12H    diphenhydrAMINE  25 mg IntraVENous q8h    sodium chloride flush  5-40 mL IntraVENous 2 times per day    enoxaparin  40 mg SubCUTAneous Daily       Social History:     Social History     Socioeconomic History    Marital status: Single     Spouse name: Not on file    Number of children: Not on file    Years of education: Not on file    Highest education level: Not on file   Occupational History    Not on file   Tobacco Use    Smoking status: Never    Smokeless tobacco: Not on file   Substance and Sexual Activity    Alcohol use: No    Drug use: No    Sexual activity: Never   Other Topics Concern    Not on file   Social History Narrative    Not on file     Social Determinants of Health     Financial Resource Strain: Not on file   Food Insecurity: Not on file   Transportation Needs: Not on file   Physical Activity: Not on file   Stress: Not on file   Social Connections: Not on file   Intimate Partner Violence: Not on file   Housing Stability: Not on file       Family History:     Family History   Problem Relation Age of Onset    Coronary Art Dis Father     High Blood Pressure Father     High Cholesterol Father     Cancer Paternal Grandfather         colon      Medical Decision Making:   I have independently reviewed/ordered the following labs:    CBC with Differential:   Recent Labs     02/13/23  0617 02/13/23  1852   WBC 37.5* 42.5*   HGB 13.9 13.9   HCT 40.0* 39.6*    168   LYMPHOPCT 4* 5*   MONOPCT 1 3       BMP:  Recent Labs     02/13/23  0617 02/13/23  1852    144   K 4.0 4.2    99   CO2 23 23   BUN 56* 72*   CREATININE 3.42* 4.36*   MG  --  2.3       Hepatic Function Panel:   Recent Labs     02/13/23  0617 02/13/23  1852   PROT 4.6* 4.7*   LABALBU 2.1* 2.2*   BILIDIR 2.3* 2.6*   IBILI 0.4  --    BILITOT 2.7* 3.0*   ALKPHOS 441* 428*   ALT >7,000* 6,917*   AST >7,000* 4,896*       No results for input(s): RPR in the last 72 hours. No results for input(s): HIV in the last 72 hours. No results for input(s): BC in the last 72 hours. Lab Results   Component Value Date/Time    CREATININE 4.36 02/13/2023 06:52 PM    GLUCOSE 150 02/13/2023 06:52 PM       Detailed results: Thank you for allowing us to participate in the care of this patient. Please call with questions. This note is created with the assistance of a speech recognition program.  While intending to generate adocument that actually reflects the content of the visit, the document can still have some errors including those of syntax and sound a like substitutions which may escape proof reading. It such instances, actual meaningcan be extrapolated by contextual diversion.     Chuy Bess MD  Office: [phone]  Perfect serve / office [phone]

## 2023-02-15 PROBLEM — I95.9 ARTERIAL HYPOTENSION: Status: ACTIVE | Noted: 2023-01-01

## 2023-02-15 PROBLEM — R09.89 PULMONARY VASCULAR CONGESTION: Status: ACTIVE | Noted: 2023-01-01

## 2023-02-15 PROBLEM — I46.9 CARDIAC ARREST (HCC): Status: ACTIVE | Noted: 2023-01-01

## 2023-02-15 NOTE — CONSULTS
Renal Consult Note    Patient :  Rory Alvares; 32 y.o. MRN# [id number]  Location:  0074/1523-82  Attending:  Melanie Falcon MD  Admit Date:  2/11/2023   Hospital Day: 4    Reason for Consult:     Asked by Dr Melanie Falcon MD to see for SAMUEL/Elevated Creatinine. History Obtained From:     Electronic medical record, ICU physician, patient's nurse, [facility] representative- Marko Bojorquez. History of Present Illness:     Rory Alvares; 32 y.o. male with past medical history of Down syndrome presented to the hospital with the chief complaint of cardiac arrest.  As per history patient has some cough with congestion and fever and then was taken to [place] ED and did receive prescription for amoxicillin. Later he apparently developed shortness of breath and altered mental status EMS was called in and had PEA cardiac arrest taken to [place] ER requiring initiation of epinephrine drip and was transferred to [facility] for further evaluation. At [place] ICU patient had been having some hypotension and did require pressor support which he is currently been weaned off of. He is currently on mechanical ventilation, sedated. He suffered anaphylaxis shock, acute hypoxic hypercapnic respiratory failure, lactic acidosis, cardiac arrest/PEA arrest, ischemic hepatitis along with acute kidney injury, aspiration pneumonia bilateral lower lobe. Patient suffered anoxic brain injury due to prolonged downtime with poor neurological examination, and neurology and infectious diseases both were on board. Later family decided to change the CODE STATUS to [facility] but would like organ procurement to be done by [facility]. Nephrology was consulted as per request from [facility] for dialysis initiation for organ optimization. I did speak with Marko Bojorquez from [facility] and they would like to take patient to the OR tonight versus tomorrow morning after dialysis.   Patient's blood work from today showed sodium 144, potassium 3.7, chloride 97, bicarb 28, calcium 6.2, magnesium 2.0, phosphorus 6.5. Patient is currently on D5W half-normal saline with 75 mEq of sodium bicarbonate at 150 cc an hour. Nephrology is consulted due to acute kidney injury, to initiate dialysis for organ procurement. Past History/Allergies? Social History:     Past Medical History:   Diagnosis Date    Allergic rhinitis     Anxiety     MR (mental retardation)     Respiratory arrest ([facility] [address] 2/11/2023    Sleep difficulties        No past surgical history on file.     Allergies   Allergen Reactions    Amoxicillin Anaphylaxis    Bactrim [Sulfamethoxazole-Trimethoprim]     Clindamycin/Lincomycin        Social History     Socioeconomic History    Marital status: Single     Spouse name: Not on file    Number of children: Not on file    Years of education: Not on file    Highest education level: Not on file   Occupational History    Not on file   Tobacco Use    Smoking status: Never    Smokeless tobacco: Not on file   Substance and Sexual Activity    Alcohol use: No    Drug use: No    Sexual activity: Never   Other Topics Concern    Not on file   Social History Narrative    Not on file     Social Determinants of Health     Financial Resource Strain: Not on file   Food Insecurity: Not on file   Transportation Needs: Not on file   Physical Activity: Not on file   Stress: Not on file   Social Connections: Not on file   Intimate Partner Violence: Not on file   Housing Stability: Not on file       Family History:        Family History   Problem Relation Age of Onset    Coronary Art Dis Father     High Blood Pressure Father     High Cholesterol Father     Cancer Paternal Grandfather         colon       Outpatient Medications:     Medications Prior to Admission: Cholecalciferol (VITAMIN D3 PO), Take by mouth daily  atorvastatin (LIPITOR) 20 MG tablet, Take 20 mg by mouth daily  Polyethylene Glycol 3350 (MIRALAX PO), Take by mouth as needed  cloNIDine (CATAPRES) 0.2 MG tablet, Take 1 tablet by mouth 2 times daily (Patient taking differently: Take 0.2 mg by mouth daily)  loratadine (CLARITIN) 10 MG tablet, Take 1 tablet by mouth daily  fluticasone (FLONASE) 50 MCG/ACT nasal spray, 1 spray by Nasal route daily  erythromycin with ethanol (EMGEL) 2 % gel, Apply topically daily. [DISCONTINUED] docusate sodium (COLACE) 100 MG capsule, Take 1 capsule by mouth daily as needed for Constipation. Current Medications:     Scheduled Meds:    aztreonam  1,000 mg IntraVENous Q12H    ipratropium-albuterol  1 ampule Inhalation Q4H    sodium chloride (Inhalant)  4 mL Nebulization Q8H    insulin lispro  0-8 Units SubCUTAneous Q4H    moxifloxacin  400 mg IntraVENous Q24H    sodium chloride  250 mL IntraVENous Once    pantoprazole (PROTONIX) 40 mg injection  40 mg IntraVENous Q12H    diphenhydrAMINE  25 mg IntraVENous q8h    sodium chloride flush  5-40 mL IntraVENous 2 times per day    enoxaparin  40 mg SubCUTAneous Daily     Continuous Infusions:    norepinephrine Stopped (23)    dextrose      propofol Stopped (23)    sodium chloride Stopped (02/15/23 06)     PRN Meds:  fentanNYL, glucose, dextrose bolus **OR** dextrose bolus, glucagon (rDNA), dextrose, artificial tears, sodium chloride flush, sodium chloride, ondansetron **OR** ondansetron, polyethylene glycol, potassium chloride **OR** potassium chloride, magnesium sulfate, sodium phosphate IVPB **OR** sodium phosphate IVPB **OR** sodium phosphate IVPB    Review of Systems:     Review of systems not possible as patient is on vent     Input/Output:       I/O last 3 completed shifts: In: 6424.3 [I.V.:5744.1;  IV Piggyback:680.2]  Out:  [Urine:1965; Emesis/NG output:100]    Vital Signs:   Temperature:  Temp: 99.3 °F (37.4 °C)  TMax:   Temp (24hrs), Av °F (37.2 °C), Min:96.6 °F (35.9 °C), Max:100.2 °F (37.9 °C)    Respirations:  Resp: 15  Pulse:   Heart Rate: (!) 106  BP:    BP: (!) 148/81  BP Range: Systolic (24hrs), Av , Min:148 , CLY:457       Diastolic (81YVK), VDI:04, Min:81, Max:89      Physical Examination:     General:  Sedated on ventilator. HEENT:  Atraumatic, normocephalic, ET tube positive. Eyes:   Pupils equal, round and reactive to light, pallor, no icterus. Neck:   No thyromegaly, no lymphadenopathy. Chest:   Air entry fair bilaterally. Cardiac: S1 S2 RRR   Abdomen:  Soft, non-tender, no masses or organomegally appreciable, BS sluggish. :   No suprapubic or flank tenderness. Neuro:  Sedated on ventilator. Skin:   No rashes, good skin turgor. Extremities:  No edema.     Labs:       Recent Labs     23  0629 02/14/23  1837 02/15/23  06   WBC 35.4* 31.1* 23.4*   RBC 4.08* 4.08* 3.87*   HGB 12.5* 12.4* 11.9*   HCT 35.9* 35.9* 33.9*   MCV 88.0 88.0 87.6   MCH 30.6 30.4 30.7   MCHC 34.8 34.5 35.1*   RDW 14.6* 14.9* 15.1*    120* 127*   MPV 11.5 11.6 11.6      BMP:   Recent Labs     02/14/23  1725 02/15/23  0000 02/15/23  0612   * 147* 144   K 3.7 3.7 3.7   CL 99 99 97*   CO2 27 26 28   BUN 85* 87* 90*   CREATININE 5.40* 5.31* 6.15*   GLUCOSE 190* 202* 196*   CALCIUM 6.5* 6.5* 6.2*      Phosphorus:     Recent Labs     02/14/23  1725 02/15/23  0000 02/15/23  0612   PHOS 8.1* 6.7* 6.5*     Magnesium:    Recent Labs     02/14/23  1725 02/15/23  0000 02/15/23  06   MG 2.1 2.0 2.0     Albumin:    Recent Labs     02/14/23  1725 02/15/23  0000 02/15/23  0612   LABALBU 2.5* 2.5* 2.2*     SPEP:  Lab Results   Component Value Date/Time    PROT 4.5 02/15/2023 06:12 AM     Urinalysis/Chemistries:      Lab Results   Component Value Date/Time    NITRU NEGATIVE 02/15/2023 06:11 AM    COLORU Dark Yellow 02/15/2023 06:11 AM    PHUR 6.5 02/15/2023 06:11 AM    WBCUA 10 TO 20 02/15/2023 06:11 AM    RBCUA 5 TO 10 02/15/2023 06:11 AM    MUCUS NOT REPORTED 11/15/2021 08:02 AM    TRICHOMONAS NOT REPORTED 11/15/2021 08:02 AM    YEAST NOT REPORTED 11/15/2021 08:02 AM    BACTERIA 3+ 2023 04:04 PM    SPECGRAV 1.014 02/15/2023 06:11 AM    LEUKOCYTESUR NEGATIVE 02/15/2023 06:11 AM    UROBILINOGEN Normal 02/15/2023 06:11 AM    BILIRUBINUR NEGATIVE 02/15/2023 06:11 AM    GLUCOSEU 1+ 02/15/2023 06:11 AM    KETUA NEGATIVE 02/15/2023 06:11 AM    AMORPHOUS NOT REPORTED 11/15/2021 08:02 AM     Radiology:     Reviewed. Assessment:     Acute Kidney Injury likely ATN due to hypotension requiring pressor support along with underlying infection. Baseline creatinine seems to be around 0.9-1 mg/dl. Cardiac arrest/PEA arrest.  Anoxic brain injury likely due to prolonged downtime with poor neurological examination, neurology on board. Anaphylaxis shock. Acute hypoxic hypercapnic respiratory failure. Hypotension required pressor support this admission. Metabolic acidosis. Ischemic hepatitis. Lactic acidosis. Aspiration pneumonia bilateral lower lobe. Down syndrome. Pulmonary vascular congestion and bilateral effusions as per chest x-ray 2/15/2023. Plan:     Can stop bicarb. I did speak with organ  Meg [facility]ott and they are requesting to initiate dialysis for organ optimization. Will get ICU to place in a Ceferino catheter for hemodialysis. Will run today for 3 hours and try to take about 2-3 kgs off as tolerated. Organ procurement team/[facility] did speak with family to update them about the dialysis initiation. I also spoke with patient's family, his mother and sister. Discussed the case and explained them about the dialysis as requested by [facility]. I did explain them about the potential risk of SCD and they were okay with getting the procedure done as required. Will follow. Nutrition   Please ensure that patient is on a renal diet/TF. Avoid nephrotoxic drugs/contrast exposure. Thank you for the consultation. Please do not hesitate to contact us for any further questions/concerns. Mark Laurent MD  [facility]     This note is created with the assistance of a speech-recognition program. While intending to generate a document that actually reflects the content of the visit, no guarantees can be provided that every mistake has been identified and corrected by editing.

## 2023-02-15 NOTE — PROGRESS NOTES
Dialysis Time Out  To be done by RN and tech or 2 RNs  Staff Names [address] [facility] ICU RN    [x]  Identity of the patient using 2 patient identifiers  [x]  Consent for treatment  [x]  Equipment-proper machine and dialyzer  [x]  B-Hep B status  [x]  Orders- to include bath, blood flow, dialyzer, time and fluid removal  [x]  Access-Correct site and in working order  [x]  Time for patient to ask questions.

## 2023-02-15 NOTE — PROGRESS NOTES
[facility] -   Infectious diseases evaluation  admission date 2/11/2023    reason for consultation:   sepsis    Impression :   Current:  Anaphylactic shock after amoxicillin  Respiratory and cardiac arrest, intubated  Bilateral lower lobe atelectasis/aspiration pneumonia  Bandemia   Other:  Down syndrome  Discussion / summary of stay / plan of care     Recommendations   Anaphylaxis to amoxicillin, completely deferred the beta-lactam family at this point  Keep Avelox to cover pneumococcus as well as aspiration  DNRCCA and planned DNR CC w organ donation - disc w family    Infection Control Recommendations   [place] Precautions  Contact Isolation       Antimicrobial Stewardship Recommendations   Simplification of therapy  Targeted therapy      History of Present Illness:   Initial history:  Marycruz Aranda is a [place]y.o.-year-old male with Down syndrome, was at home and was very short of breath altered mentation, EMS called he was cyanotic then respiratory arrest, CPR, 10 minutes to ROSC,  Unstable narrow complex tachycardia blood pressure of 5 0, given amiodarone and intubated. Since admission to the ER with sinusitis was treated with amoxicillin and Decadron. According to the family, as soon as he took the amoxicillin he turned blue and had difficulty breathing and this is when they called the EMS    Patient placed on FiO2 of 100 and PEEP of 5  Transferred after that to [facility]    WBC elevated likely from steroids  Surgery consulted and planning LTME  Chest x-ray on 2/12 shows patchy bilateral infrahilar opacities, seems to have increased on the left    CT head diffuse anoxic brain injury. 2/12  temp not well controlled and hypothermic  Not responding   Sp small  FIo2 30 and peep 8      Interval changes  2/15/2023   Patient Vitals for the past 8 hrs:   BP Temp Temp src Pulse Resp SpO2 Weight   02/15/23 1000 (!) 148/81 99.3 °F (37.4 °C) Bladder (!) 108 15 94 % -- 02/15/23 0821 -- 99.7 °F (37.6 °C) -- (!) 108 15 96 % --   02/15/23 0800 (!) 156/89 99.7 °F (37.6 °C) Bladder (!) 105 17 100 % --   02/15/23 0759 -- -- -- (!) 105 15 100 % --   02/15/23 0757 -- -- -- (!) 106 15 100 % --   02/15/23 0700 -- 99.9 °F (37.7 °C) -- (!) 113 15 94 % --   02/15/23 0600 -- 99.9 °F (37.7 °C) -- (!) 115 16 96 % 225 lb 8.5 oz (102.3 kg)   02/15/23 0504 -- -- -- (!) 116 16 95 % --   02/15/23 0500 -- 100 °F (37.8 °C) -- (!) 116 16 95 % --   02/15/23 0400 -- 100.2 °F (37.9 °C) -- (!) 114 15 99 % --   02/15/23 0331 -- -- -- (!) 114 15 96 % --   02/15/23 0330 -- -- -- (!) 114 15 96 % --   02/15/23 0300 -- 100.2 °F (37.9 °C) -- (!) 115 15 96 % --     CT anoxic brain injury  Not responding  WBC on the rise  cultures neg  Sp cx neg    2/14  LGF and still not responding on the vent  Peep  FIO2     2/15  WBC 23  Creat 6  All cx are neg  CXR white bilat      Summary of relevant labs:  Labs:  WBC  24 -31 - 28-42-31 - 23  Micro:  Sp cx GPR 2/12  BC 2/12  Nasal MRSA neg 2/11  Resp PCR panel neg   U cx neg 2/11    Imaging:  CXR 2/15 - personally reviewed, bilat diffuse white infil      Chest x-ray 2/12 patchy bilateral infrahilar opacities left more than the right    2/11 CT scan of the chest.  No PE, pulmonary infiltrate bilateral lower lobes with atelectasis, focal right perihilar infiltrate      I have personally reviewed the past medical history, past surgical history, medications, social history, and family history, and I haveupdated the database accordingly. Allergies:   Amoxicillin, Bactrim [sulfamethoxazole-trimethoprim], and Clindamycin/lincomycin     Review of Systems:     Review of Systems   Unable to perform ROS: Intubated     Physical Examination :       Physical Exam  Constitutional:       General: He is not in acute distress. Appearance: He is ill-appearing. HENT:      Head: Normocephalic and atraumatic.       Nose: Nose normal.      Mouth/Throat:      Mouth: Mucous membranes are moist.   Eyes:      General: No scleral icterus. Cardiovascular:      Rate and Rhythm: Normal rate and regular rhythm. Heart sounds: Normal heart sounds. No murmur heard. No friction rub. Pulmonary:      Breath sounds: No stridor. No rhonchi. Abdominal:      General: There is no distension. Palpations: There is no mass. Tenderness: There is no guarding. Hernia: No hernia is present. Genitourinary:     Comments: No cheng  Musculoskeletal:         General: No swelling or deformity. Cervical back: Neck supple. No rigidity or tenderness. Skin:     Coloration: Skin is not jaundiced. Findings: No bruising, erythema or rash. Neurological:      Comments: intubated   Psychiatric:      Comments: Intubated        Past Medical History:     Past Medical History:   Diagnosis Date    Allergic rhinitis     Anxiety     MR (mental retardation)     [facility]23    Sleep difficulties        Past Surgical  History:   No past surgical history on file.     Medications:      aztreonam  1,000 mg IntraVENous Q12H    ipratropium-albuterol  1 ampule Inhalation Q4H    sodium chloride (Inhalant)  4 mL Nebulization Q8H    insulin lispro  0-8 Units SubCUTAneous Q4H    moxifloxacin  400 mg IntraVENous Q24H    sodium chloride  250 mL IntraVENous Once    pantoprazole (PROTONIX) 40 mg injection  40 mg IntraVENous Q12H    diphenhydrAMINE  25 mg IntraVENous q8h    sodium chloride flush  5-40 mL IntraVENous 2 times per day    enoxaparin  40 mg SubCUTAneous Daily       Social History:     Social History     Socioeconomic History    Marital status: Single     Spouse name: Not on file    Number of children: Not on file    Years of education: Not on file    Highest education level: Not on file   Occupational History    Not on file   Tobacco Use    Smoking status: Never    Smokeless tobacco: Not on file   Substance and Sexual Activity    Alcohol use: No    Drug use: No    Sexual activity: Never Other Topics Concern    Not on file   Social History Narrative    Not on file     Social Determinants of Health     Financial Resource Strain: Not on file   Food Insecurity: Not on file   Transportation Needs: Not on file   Physical Activity: Not on file   Stress: Not on file   Social Connections: Not on file   Intimate Partner Violence: Not on file   Housing Stability: Not on file       Family History:     Family History   Problem Relation Age of Onset    Coronary Art Dis Father     High Blood Pressure Father     High Cholesterol Father     Cancer Paternal Grandfather         colon      Medical Decision Making:   I have independently reviewed/ordered the following labs:    CBC with Differential:   Recent Labs     02/13/23 1852 02/14/23  0629 02/14/23  1837 02/15/23  0612   WBC 42.5* 35.4* 31.1* 23.4*   HGB 13.9 12.5* 12.4* 11.9*   HCT 39.6* 35.9* 35.9* 33.9*    149 120* 127*   LYMPHOPCT 5* 3*  --   --    MONOPCT 3 6  --   --        BMP:  Recent Labs     02/15/23  0000 02/15/23  0612   * 144   K 3.7 3.7   CL 99 97*   CO2 26 28   BUN 87* 90*   CREATININE 5.31* 6.15*   MG 2.0 2.0       Hepatic Function Panel:   Recent Labs     02/13/23  0617 02/13/23  1852 02/15/23  0000 02/15/23  0612   PROT 4.6*   < > 4.5* 4.5*   LABALBU 2.1*   < > 2.5* 2.2*   BILIDIR 2.3*   < > 2.8* 2.4*   IBILI 0.4  --   --   --    BILITOT 2.7*   < > 3.1* 3.1*   ALKPHOS 441*   < > 260* 243*   ALT >7,000*   < > 3,335* 2,796*   AST >7,000*   < > 631* 443*    < > = values in this interval not displayed. No results for input(s): RPR in the last 72 hours. No results for input(s): HIV in the last 72 hours. No results for input(s): BC in the last 72 hours. Lab Results   Component Value Date/Time    CREATININE 6.15 02/15/2023 06:12 AM    GLUCOSE 196 02/15/2023 06:12 AM       Detailed results: Thank you for allowing us to participate in the care of this patient. Please call with questions.     This note is created with the assistance of a speech recognition program.  While intending to generate adocument that actually reflects the content of the visit, the document can still have some errors including those of syntax and sound a like substitutions which may escape proof reading. It such instances, actual meaningcan be extrapolated by contextual diversion.     Devin Godoy MD  Office: [phone]  Perfect serve / office [phone]

## 2023-02-15 NOTE — PROCEDURES
Referring physician: Dr. Seema Solis  Date:2/15/2023  Start Time:2/14/2023 @ 0730  End Time: 2/15/2023 @ 0730    Indication  Patient with encephalopathy, EEG done to rule out subclinical seizures. Introduction  This continuous video-EEG was acquired using a Medif[employer] workstation at 256 samples/s. Electrodes were placed according to the International 10-20 system. Automated spike and seizure detection algorithms were applied. Video was recorded during this study. Description  The back ground consistent of complete suppression pattern. No consistent focal slowing or interhemispheric asymmetry was noted. Normal sleep structures were observed. There were no interictal epileptiform discharges or electrographic seizures. Events  No events reported or recorded. Impression. Abnormal continuous vEEG recording, the slowing mentioned above suggests severe non specific encephalopathy. No changes from previous study    No epileptiform discharges were identified. Please note the absence of   such activity in this record cannot conclusively rule out an epileptic   disorder. If such is still clinically suspected, a repeat study with   prolonged sampling may be helpful. Veena Lindquist MD  Epilepsy Board Certified. Neurology Board Certified.     Electronically Signed

## 2023-02-15 NOTE — PROGRESS NOTES
SPIRITUAL CARE DEPARTMENT - [address]  PROGRESS NOTE    Shift date: 2/15/2023   Shift day: Wednesday   Shift # 1    Room # 3024/3024-01   Name: Kamille Perez                Buddhism: Other   Place of Adventism:     Referral:  Family care as they await news of organ donation surgery    Admit Date & Time: 2/11/2023  8:18 PM    Assessment:  Kamille Perez is a 32 y.o. male. Upon entering the room writer observes two family members present. They appeared calm but concerned as they said they are still waiting to find out when the organ procurement will occur. Family asked for prayer. Intervention:  Writer introduced self and title as  Writer offered space for family  to express feelings, needs, and concerns and provided a ministry presence.  prayed at bedside with family. Outcome:  Family was receptive to visit and expressed appreciation for prayer and support. Plan:  Chaplains will remain available to offer spiritual and emotional support as needed.       Electronically signed by Gene Goldstein on 2/15/2023 at 10:08 AM.  [facility]  [phone]        02/15/23 1007   Encounter Summary   Service Provided For: Family   Referral/Consult From: Silke   Last Encounter  02/15/23   Complexity of Encounter Moderate   Begin Time 0930   End Time  0940   Total Time Calculated 10 min   Encounter    Type Family Care   Assessment/Intervention/Outcome   Assessment Coping   Intervention Active listening;Explored/Affirmed feelings, thoughts, concerns;Prayer (assurance of)/R[place]   Outcome Expressed feelings, needs, and concerns;Expressed Gratitude;Receptive

## 2023-02-15 NOTE — PROGRESS NOTES
Neuro Critical Care Progress Note    Reason for Consult:  Post cardiac arrest  Requesting Physician: Dr. Ritchie Officer  Attending Physician: Dr. Ronal Shaw    History Obtained From:  [facility]ion[facility] record    CHIEF COMPLAINT:       Post cardiac arrest    HISTORY OF PRESENT ILLNESS:       The patient is a 32 y.o. male with history of down syndrome who presents as a transfer from [facility] for cardiac arrest. Patient reportedly had been sick with upper respiratory symptoms for the past week, was prescribed amoxicillin and decadron and discharged home. After taking his first dose of amoxicillin, patient developed difficulty breathing and altered mentation. Upon EMS arrival, he was found cyanotic with pulse and bag ventilation was started. En route to the ER, patient lost pulses, unclear initial rhythm. CPR was done for ? 20 minutes with ROSC achieved. He then went into an unstable wide complex tachycardia with SBP 50s. 150 mg amiodarone administered and patient was subsequently intubated. Started on an epinephrine infusion and transferred to [facility]. Admitted to MICU, started on antibiotics and steroids pneumonia. Epinephrine infusion discontinued, started on bicarb infusion. Off sedation since 7 am yesterday. 2/12: On exam, patient open eyes spontaneously, dysconjugate gaze. Does not track or follow commands. Pupils equal and reactive. Absent corneals, cough/gag. No movement to noxious stimulation. 2/13: Overnight, patient had acute pupillary change with right pupil 3 mm and left 5 mm. Right was initially reactive, left was not. Stat CTH obtained and showed diffuse anoxic brain injury. Pupils then became bilaterally 5-6 mm and unreactive. On my exam this am, patient has no corneal, cough, gag or response to noxious stimuli. LTME flat.      2/14: No changes overnight, patients pupils are 7mm and non-reactive to light, no cough, gag, or corneal reflexes present, patients family elected change code status to [facility].     2/15: Exam remains unchanged, patient continues to have respiratory effort and initiates breaths on pressure support PS 8 above peep. Per nursing staff the plan is for withdraw of care, timeline to be determined. PAST MEDICAL HISTORY :       Past Medical History:        Diagnosis Date    Allergic rhinitis     Anxiety     MR (mental retardation)     Respiratory arrest ([address] 2/11/2023    Sleep difficulties        Past Surgical History:    No past surgical history on file. Social History:   Social History     Socioeconomic History    Marital status: Single     Spouse name: Not on file    Number of children: Not on file    Years of education: Not on file    Highest education level: Not on file   Occupational History    Not on file   Tobacco Use    Smoking status: Never    Smokeless tobacco: Not on file   Substance and Sexual Activity    Alcohol use: No    Drug use: No    Sexual activity: Never   Other Topics Concern    Not on file   Social History Narrative    Not on file     Social Determinants of Health     Financial Resource Strain: Not on file   Food Insecurity: Not on file   Transportation Needs: Not on file   Physical Activity: Not on file   Stress: Not on file   Social Connections: Not on file   Intimate Partner Violence: Not on file   Housing Stability: Not on file       Family History:       Problem Relation Age of Onset    Coronary Art Dis Father     High Blood Pressure Father     High Cholesterol Father     Cancer Paternal Grandfather         colon       Allergies:  Amoxicillin, Bactrim [sulfamethoxazole-trimethoprim], and Clindamycin/lincomycin    Home Medications:  Prior to Admission medications    Medication Sig Start Date End Date Taking?  Authorizing Provider   Cholecalciferol (VITAMIN D3 PO) Take by mouth daily   Yes Historical Provider, MD   atorvastatin (LIPITOR) 20 MG tablet Take 20 mg by mouth daily    Historical Provider, MD   Polyethylene Glycol 3350 (MIRALAX PO) Take by mouth as needed    Historical Provider, MD   cloNIDine (CATAPRES) 0.2 MG tablet Take 1 tablet by mouth 2 times daily  Patient taking differently: Take 0.2 mg by mouth daily 5/8/15   Teo Hawkins MD   loratadine (CLARITIN) 10 MG tablet Take 1 tablet by mouth daily 5/8/15   Teo Hawkins MD   fluticasone (FLONASE) 50 MCG/ACT nasal spray 1 spray by Nasal route daily 5/8/15   Teo Hawkins MD   erythromycin with ethanol (EMGEL) 2 % gel Apply topically daily.  11/4/14   Teo Hawkins MD       Current Medications:   Current Facility-Administered Medications: aztreonam (AZACTAM) 1,000 mg in sodium chloride 0.9 % 50 mL IVPB (mini-bag), 1,000 mg, IntraVENous, Q12H  ipratropium-albuterol (DUONEB) nebulizer solution 1 ampule, 1 ampule, Inhalation, Q4H  sodium chloride (Inhalant) 3 % nebulizer solution 4 mL, 4 mL, Nebulization, Q8H  norepinephrine (LEVOPHED) 16 mg in sodium chloride 0.9 % 250 mL infusion, 2-100 mcg/min, IntraVENous, Continuous  fentaNYL (SUBLIMAZE) injection 50 mcg, 50 mcg, IntraVENous, Q1H PRN  glucose chewable tablet 16 g, 4 tablet, Oral, PRN  dextrose bolus 10% 125 mL, 125 mL, IntraVENous, PRN **OR** dextrose bolus 10% 250 mL, 250 mL, IntraVENous, PRN  glucagon (rDNA) injection 1 mg, 1 mg, SubCUTAneous, PRN  dextrose 10 % infusion, , IntraVENous, Continuous PRN  insulin lispro (HUMALOG) injection vial 0-8 Units, 0-8 Units, SubCUTAneous, Q4H  lubrifresh P.M. (artificial tears) ophthalmic ointment, , Both Eyes, PRN  moxifloxacin (AVELOX) IVPB 400 mg, 400 mg, IntraVENous, Q24H  sodium bicarbonate 75 mEq in dextrose 5 % and 0.45 % NaCl 1,000 mL infusion, , IntraVENous, Continuous  0.9 % sodium chloride bolus, 250 mL, IntraVENous, Once  pantoprazole (PROTONIX) 40 mg in sodium chloride (PF) 0.9 % 10 mL injection, 40 mg, IntraVENous, Q12H  propofol injection, 5-50 mcg/kg/min, IntraVENous, Continuous  diphenhydrAMINE (BENADRYL) injection 25 mg, 25 mg, IntraVENous, q8h  sodium chloride flush 0.9 % injection 5-40 mL, 5-40 mL, IntraVENous, 2 times per day  sodium chloride flush 0.9 % injection 5-40 mL, 5-40 mL, IntraVENous, PRN  0.9 % sodium chloride infusion, , IntraVENous, PRN  enoxaparin (LOVENOX) injection 40 mg, 40 mg, SubCUTAneous, Daily  ondansetron (ZOFRAN-ODT) disintegrating tablet 4 mg, 4 mg, Oral, Q8H PRN **OR** ondansetron (ZOFRAN) injection 4 mg, 4 mg, IntraVENous, Q6H PRN  polyethylene glycol (GLYCOLAX) packet 17 g, 17 g, Oral, Daily PRN  potassium chloride 20 mEq/50 mL IVPB (Central Line), 20 mEq, IntraVENous, PRN **OR** potassium chloride 10 mEq/100 mL IVPB (Peripheral Line), 10 mEq, IntraVENous, PRN  magnesium sulfate 2000 mg in 50 mL IVPB premix, 2,000 mg, IntraVENous, PRN  sodium phosphate 10 mmol in sodium chloride 0.9 % 250 mL IVPB, 10 mmol, IntraVENous, PRN **OR** sodium phosphate 15 mmol in sodium chloride 0.9 % 250 mL IVPB, 15 mmol, IntraVENous, PRN **OR** sodium phosphate 20 mmol in sodium chloride 0.9 % 500 mL IVPB, 20 mmol, IntraVENous, PRN    REVIEW OF SYSTEMS:       Unable to assess due to current condition    PHYSICAL EXAM:       BP (!) 147/92   Pulse (!) 113   Temp 99.9 °F (37.7 °C)   Resp 15   Ht 5' 7.01\" (1.702 m)   Wt 225 lb 8.5 oz (102.3 kg)   SpO2 94%   BMI 35.31 kg/m²       CONSTITUTIONAL:  Intubated, off sedation,  Does not track or follow commands. HEAD:  normocephalic, atraumtic   EYES:  Pupils 7 mm unreactive   ENT:  moist mucous membranes   NECK:  supple, symmetric   LUNGS:  Equal air entry bilaterally, course breath sounds   CARDIOVASCULAR:  normal s1 / s2, tachycardic    ABDOMEN:  Soft, no rigidity   NEUROLOGIC:  Mental Status:  Intubated, off sedation, no eye opening, no spontaneous movement             Cranial Nerves:    III: Pupils:  fixed and dilated   V: Corneal reflex:  completed.   abnormal absent bilaterally  Absent cough/gag    Motor Exam:    No movement to noxious stimulation   SKIN:  no rash        LABS AND IMAGING: CBC with Differential:    Lab Results   Component Value Date/Time    WBC 23.4 02/15/2023 06:12 AM    RBC 3.87 02/15/2023 06:12 AM    HGB 11.9 02/15/2023 06:12 AM    HCT 33.9 02/15/2023 06:12 AM     02/15/2023 06:12 AM    MCV 87.6 02/15/2023 06:12 AM    MCH 30.7 02/15/2023 06:12 AM    MCHC 35.1 02/15/2023 06:12 AM    RDW 15.1 02/15/2023 06:12 AM    NRBC 1 02/13/2023 06:52 PM    LYMPHOPCT 3 02/14/2023 06:29 AM    MONOPCT 6 02/14/2023 06:29 AM    BASOPCT 0 02/14/2023 06:29 AM    MONOSABS 2.12 02/14/2023 06:29 AM    LYMPHSABS 1.06 02/14/2023 06:29 AM    EOSABS 0.00 02/14/2023 06:29 AM    BASOSABS 0.00 02/14/2023 06:29 AM    DIFFTYPE NOT REPORTED 04/25/2019 10:39 AM     BMP:    Lab Results   Component Value Date/Time     02/15/2023 06:12 AM    K 3.7 02/15/2023 06:12 AM    CL 97 02/15/2023 06:12 AM    CO2 28 02/15/2023 06:12 AM    BUN 90 02/15/2023 06:12 AM    LABALBU 2.2 02/15/2023 06:12 AM    CREATININE 6.15 02/15/2023 06:12 AM    CALCIUM 6.2 02/15/2023 06:12 AM    GFRAA >60 03/22/2022 11:46 AM    LABGLOM 12 02/15/2023 06:12 AM    GLUCOSE 196 02/15/2023 06:12 AM       Radiology Review:    XR CHEST PORTABLE   Final Result   Stable exam.         XR CHEST (SINGLE VIEW FRONTAL)   Final Result   No significant interval change. Unchanged positioning of support devices. XR CHEST PORTABLE   Preliminary Result   Endotracheal tube tip is just above the mika and should be retracted   approximately 1-2 cm. Otherwise stable exam.      The findings were sent to the Radiology Results [address] at 12:37   pm on 2/14/2023 to be communicated to a licensed caregiver. XR CHEST PORTABLE   Final Result   Mild cardiomegaly with central pulmonary edema. Support tubes and catheters as noted above. CT CHEST ABDOMEN PELVIS WO CONTRAST Additional Contrast? None   Final Result   Small pericardial effusion. Moderate left and small right pleural effusion.   Extensive consolidative change of left lower lobe and moderate consolidative change of right lower   lobe likely related to atelectasis. Mild amount of ascites within the peritoneal cavity. Extensive stranding through the intraperitoneal and retroperitoneal spaces. Finding may be related to fluid overload. XR CHEST PORTABLE   Final Result   Addendum (preliminary) 1 of 1   ADDENDUM:   Addendum is being made for measurement of aortic knob white and diaphragm   width. Aortic knob width is 27 mm.  diaphragm width is 33 cm. Final      Support lines and devices are stable and in satisfactory position      Interval worsening of focal consolidative change and ground-glass densities   within the left lower lobe and interval development of right parahilar and   infrahilar ground-glass densities. Right lung measures 19.7 x 12 cm and left lung measures 19.2x9 cm. CT HEAD WO CONTRAST   Final Result   Addendum (preliminary) 1 of 1   ADDENDUM:   Results conveyed to Dr. Ronal Shaw at 9:08 pm on 2/12/2023. Final   CT findings most consistent with diffuse anoxic brain injury. The findings were sent to the Radiology Results [id number] at 9:04   pm on 2/12/2023 to be communicated to a licensed caregiver. XR CHEST (SINGLE VIEW FRONTAL)   Final Result   1. Interval placement of right IJ approach central line, tip overlying the   SVC region. No evidence of pneumothorax. 2. Patchy bilateral infrahilar opacities. Similar on the right and   intervally worse on the left. XR ABDOMEN FOR NG/OG/NE TUBE PLACEMENT   Final Result   Enteric tube in appropriate position. CT CHEST PULMONARY EMBOLISM W CONTRAST   Final Result   No evidence of pulmonary embolism or acute aortic disease. Pulmonary   infiltrates in both lower lobes with atelectatic changes. Focal right   perihilar right upper lobe infiltrate. Mild pericardial effusion. Mild right hilar lymphadenopathy.       Life support system appear stable with endotracheal and nasogastric tubes in   good position. XR CHEST PORTABLE   Final Result   Endotracheal tube is barely above the mika. NG tube is in the stomach but   the side hole is not seen. XR CHEST PORTABLE    (Results Pending)           ASSESSMENT AND PLAN:       33 yo male with history of down syndrome (unclear baseline) who presents post cardiac arrest related to anaphylactic reaction to amoxicillin. Has been ill with upper respiratory symptoms for past 5-6 days and was discharged home yesterday with amoxicillin and steroids. Shortly after receiving his first dose of antibiotics, he developed respiratory distress and AMS. Upon EMS arrival, patient was cyanotic and lost pulses shortly thereafter. 20 minutes of CPR with ROSC. Followed by unstable narrow complex rhythm, given 150 mg amiodarone. Transferred from [place] [address] ED and admitted to MICU. Neuro ICU consult for neuro prognostication. Encephalopathy  Poor neurological examination, pupils fixed and dilated, absence of reflexes  CTH 2/12 with diffuse anoxic brain injury  Discontinue LTME  Plan for withdraw of care, timeline to be determined       Cardiac arrest secondary to respiratory failure related to anaphylactic reaction to amoxicillin  Unclear initial rhythm  Elevated troponin    Respiratory arrest   Intubated,ventted, off sedation   CTPE: no evidence of pulmonary embolism or acute aortic disease. Pulmonary     Drug-induced anaphylaxis, initial encounter  Anaphylactic reactions to amoxicillin. Epi drip is discontinued. Solu-Medrol 40 mg every 8 hours - dced  Benadryl 25 mg every 8 hours. Sepsis  Cultures sent  On Azactam   On sodium bicarb drip   Afebrile, recommend to maintain euthermia    SAMUEL and shock liver      We will continue to follow along. For any changes in exam or patient status please contact Neuro Critical Care.       DANNIE Ventura - CNP  Neuro Critical Care  2/15/2023 7:44 AM

## 2023-02-15 NOTE — OP NOTE
[facility]  Transesophageal Echocardiogram       Today's Date:  2/15/2023  Indication:   Organ Donor evaluation    Operators:  Primary:   Dr Amanda Contreras (Attending Physician)  Assistant:   Maria Fernanda Quezada MD (Cardiovascular Fellow)      Pre Procedure Conscious Sedation Data:    ASA Class:    [] I [] II [x] III [] IV    Mallampati Class:  [] I [x] II [] III [] IV    Procedure:    Patient seen and examined. History and Physical reviewed. Labs reviewed. After informed consent was obtained with explanation of the risks and benefits, the patient was brought to cardiac cath lab. All sedation was administered by the cardiologist. The oropharynx was pre-anesthesized with viscous lidocaine and cetacaine spray. The ultrasound probe was passed without any difficulty. BRYAN findings:    LA:  Normal  ANKIT:  No thrombus  RA:  Normal  RV:   Normal  LV:  Normal  Estimated LVEF:  55%  Aorta:   Mild atheromatous disease arch  Pericardium: No pericardial effusion  Septum:  No intracardiac shunt via color Doppler. Valves:    Mitral Valve: Structurally normal. No regurgitation is identified. Aortic Valve: The aortic valve is trileaflet and opens adequately. No regurgitiation is identified. Tricuspid valve: Structurally normal. No regurgitation is identified. Pulmonary valve: Normal. No significant regurgitation    No valvular vegetations or thrombus identified. Summary:     1. A BRYAN was performed without complications. 2. LVEF 55%  3. No thrombus or valvular vegetation identified  4. No intracardiac shunt via color Doppler. 5. There were no complications encountered.       Electronically signed by Maria Fernanda Quezada MD on 2/15/2023 at 3:43 PM  Cardiovascular Diseases Fellow  [id number] [facility] key elements of the encounter have been performed by me. I agree with the assessment, plan and orders as documented by the resident With changes made to the note.   I was present during entire procedure and performed all critical elements of the procedure    Electronically signed by Kandace Lacey MD on 2/21/2023 at 4:54 PM.    [facility]      [phone]

## 2023-02-15 NOTE — CARE COORDINATION
Transitional planning. [employer], [facility], final stages of allocation, possible OR later this evening or tomorrow. Need BRYAN today, consulting nephrology for CRRT.

## 2023-02-15 NOTE — PROCEDURES
Description of procedure      [X] Fiberoptic Bronchoscopy   [X]  Bronchoalveolar lavage   []  Bronchial washing   []  Bronchial brushing   []  Transbronchial biopsy   []  Endobronchial biopsy   []  Transbronchial needle aspiration   []  Endobronchial ultrasound-guided   []  Pretracheal node   []  Subcarinal node   []  Right hilar node   []  Left hilar node   []  AP window node   []  Mass   []  Interventional procedure  []  With fluoroscopy   []  Balloon dilatation   []  Stent placement   []  Argon plasma coagulation   []  Nd:YAG laser  []  Inspection only   []  With fluoroscopic guidance. []  With ultrasound guidance. Indication for Bronchoscopy     []  Nodule(s). [] Atelectasis  [] Hemoptysis  [] Pneumonia/Pulmonary infiltrate  [X] Respiratory failure/Hypoxemia  [] Airway Stenosis/Obstruction  []  Endotracheal mass/obstruction  [] Endobronchial mass/obstruction  [] Adenopathy. [] Immunocompromised host.     [] Lung cancer  [] Metastatic disease.  [X] Lung transplant donor evaluation  [] Bronchiectasis. [] Therapeutic, to clear airway secretions    Consent    [] Performed emergently  [X] Details of the procedure were explained in detail which included risks and benefits. An opportunity was provided to ask questions. Consent was obtained from the following person(s):   [] Patient   [] Sibling   [] Spouse             [X] Parent   [] Child   [] Guardian    Documentation Review     [X] Patient admission history and physical examination as well as interval hospital course documentation reviewed prior to initiating procedure and/or procedural sedation    Time out     [X] The process of patient care was interrupted for all participants to confirm the correct patient, correct procedure, correct site and the availability of appropriate equipment. Please see nursing documentation for those present.     Quick Look     [X] Prior to initiating procedural sedation a \"quick look\" revealed pulse oximetry and vital signs appropriate to proceed      Airway   [X] Via mouth (ET tube). [] Via nares. Monitoring  [X] Arterial Pressure   [X] NIBP     [X] ECG   [X] Oxygen supplemented as needed   [X] Pulse oximetry  [X] End-tidal capnography     Endobronchial findings      Distal Trachea: Mild erythema and mucous plugging  Wilma  Normal mucosa    Right Main Stem Bronchus  Normal mucosa  Right Upper Lobe Bronchi Normal mucosa  Right Middle Lobe Bronchi  Normal mucosa  Right Lower Lobe Bronchi (including the Superior segment)  Normal mucosa and moderate mucus plugging    Left Main Stem Bronchus Normal mucosa  Left Upper Lobe Bronchus, Upper Division Normal mucosa and moderate mucus plugging  Left Upper Lobe Bronchus, Lingula  Normal mucosa and moderate mucus plugging  Left Lower Lobe Bronchus (including the Superior segment)  Normal mucosa      [X] The bronchoscope was withdrawn without difficulty. [X] The patient tolerated the procedure well. Patient condition: stable    Estimated blood loss: None    Complications: None    Chest radiograph: None    Specimens Obtained:     Specimen Bronchoalveolar Lavage Bronchial Wash Bronchial [place] Transbronchial Biopsy Endobronchial Biopsy    mL Instilled/Lavaged       Segment        RUL        RML 30/20       RLL        SHARON 30/10       Lingula        LLL          Labs requested:    Bronchoalveolar lavage for: Gram's stain and culture,    Final Impression: Moderate mucus plugging seen in the right lower lobe and left upper lobe/lingula. No endobronchial lesion seen.       Geovanny Moss MD  Pulmonary and Critical Care Medicine  02/14/23

## 2023-02-15 NOTE — PROGRESS NOTES
INTENSIVE CARE UNIT  Resident Physician Progress Note    Patient - Emery Weaver  Date of Admission -  2023  8:18 PM  Date of Evaluation -  2/15/2023  Room and Bed Number -  3024/3024-01   Hospital Day - 4      SUBJECTIVE:     OVERNIGHT EVENTS:      2/15/2023  Bronchoscopy was done yesterday to complete organ transplant protocol. Found to have moderate mucous plugging in the right lower lobe and left upper lobe/lingula. No endotracheal lesion seen. Patient seen and examined at bedside. No acute events were reported overnight. [facility] is on the case for organ transplant, and is currently on CCA for the organ transplantation. Currently on 100% FiO2. AC/PRVC, RR 15, , PEEP of 5. ABG 7.4 2/50/465 33. Blood pressure 147/80. Heart rate 105. Off the pressors. UOP 1.2 L over the last 24 hours. Net I's and O's +10 L. On Aztreonam and Moxifloxacin. Bicarbonate drip 75 meq in half NS, D-5% @ 150 cc/hr.       OBJECTIVE:     VITAL SIGNS:  BP (!) 147/92   Pulse (!) 105   Temp 99.9 °F (37.7 °C)   Resp 15   Ht 5' 7.01\" (1.702 m)   Wt 225 lb 8.5 oz (102.3 kg)   SpO2 100%   BMI 35.31 kg/m²   Tmax over 24 hours:  Temp (24hrs), Av.6 °F (37 °C), Min:96.6 °F (35.9 °C), Max:100.2 °F (37.9 °C)      Patient Vitals for the past 8 hrs:   Temp Pulse Resp SpO2 Weight   02/15/23 0759 -- (!) 105 15 100 % --   02/15/23 0757 -- (!) 106 15 100 % --   02/15/23 0700 99.9 °F (37.7 °C) (!) 113 15 94 % --   02/15/23 0600 99.9 °F (37.7 °C) (!) 115 16 96 % 225 lb 8.5 oz (102.3 kg)   02/15/23 0504 -- (!) 116 16 95 % --   02/15/23 0500 100 °F (37.8 °C) (!) 116 16 95 % --   02/15/23 0400 100.2 °F (37.9 °C) (!) 114 15 99 % --   02/15/23 0331 -- (!) 114 15 96 % --   02/15/23 0330 -- (!) 114 15 96 % --   02/15/23 0300 100.2 °F (37.9 °C) (!) 115 15 96 % --   02/15/23 0200 100 °F (37.8 °C) (!) 116 15 96 % --   02/15/23 0100 100 °F (37.8 °C) (!) 117 15 96 % --           Intake/Output Summary (Last 24 hours) at 2/15/2023 0808  Last data filed at 2/15/2023 0626  Gross per 24 hour   Intake 4163.57 ml   Output 1210 ml   Net 2953.57 ml       Date 02/15/23 0000 - 02/15/23 2359   Shift 2878-6751 7860-8673 1120-3005 24 Hour Total   INTAKE   I.V.(mL/kg) 1868(54.2)   2155(21.1)   IV Piggyback(mL/kg) 55.7(0.5)   55.7(0.5)   Shift Total(mL/kg) 9674.7(26.6)   4379.2(91.5)   OUTPUT   Urine(mL/kg/hr) 225(0.3)   225   Shift Total(mL/kg) 225(2.2)   225(2.2)   Weight (kg) 102.3 102.3 102.3 102.3       Wt Readings from Last 3 Encounters:   02/15/23 225 lb 8.5 oz (102.3 kg)   02/11/23 207 lb (93.9 kg)   07/09/17 221 lb (100.2 kg)     Body mass index is 35.31 kg/m². PHYSICAL EXAM:    Physical Exam  Constitutional:       Comments: Intubated    Cardiovascular:      Rate and Rhythm: Regular rhythm. Tachycardia present. Pulses: Normal pulses. Pulmonary:      Comments: On vent, bl breath sounds present, Patient is breathing over the vent  Abdominal:      General: There is no distension. Tenderness: There is no abdominal tenderness.    Skin:     Comments: Cool extremities    Neurological:      Comments: Pupils dilated & sluggish, no gag or corneal, does not withdraw to pain, grimacing, makes no purposeful movements, not requiring any sedation         MEDICATIONS:  Scheduled Meds:   aztreonam  1,000 mg IntraVENous Q12H    ipratropium-albuterol  1 ampule Inhalation Q4H    sodium chloride (Inhalant)  4 mL Nebulization Q8H    insulin lispro  0-8 Units SubCUTAneous Q4H    moxifloxacin  400 mg IntraVENous Q24H    sodium chloride  250 mL IntraVENous Once    pantoprazole (PROTONIX) 40 mg injection  40 mg IntraVENous Q12H    diphenhydrAMINE  25 mg IntraVENous q8h    sodium chloride flush  5-40 mL IntraVENous 2 times per day    enoxaparin  40 mg SubCUTAneous Daily     Continuous Infusions:   norepinephrine Stopped (02/12/23 2239)    dextrose      sodium bicarbonate infusion 150 mL/hr at 02/15/23 0626    propofol Stopped (02/12/23 0704) sodium chloride Stopped (02/15/23 0624)         SUPPORT DEVICES: x entilator BIPAP  nasal Cannula room Air    VENT SETTINGS (Comprehensive) (if applicable): PRVC mode, FiO2 30%, PEEP 5, Respiratory Rate 15, Tidal Volume 500  Vent Information  Ventilator ID: [email or web address]  Equipment Changed: HME  Vent Mode: AC/PRVC  Additional Respiratory Assessments  Heart Rate: (!) 105  Resp: 15  SpO2: 100 %  End Tidal CO2: 42 (%)  Position: Semi-Cano's  Humidification Source: HME    ABGs:   Arterial Blood Gas result:  pH 7.440; pCO2 41.4; pO2 472.9; HCO3 28.1; BE4; %O2 Sat 100.   Lab Results   Component Value Date/Time    FIO2 100.0 02/15/2023 04:08 AM       DATA:  Complete Blood Count:   Recent Labs     02/14/23  0629 02/14/23  1837 02/15/23  0612   WBC 35.4* 31.1* 23.4*   RBC 4.08* 4.08* 3.87*   HGB 12.5* 12.4* 11.9*   HCT 35.9* 35.9* 33.9*   MCV 88.0 88.0 87.6   MCH 30.6 30.4 30.7   MCHC 34.8 34.5 35.1*   RDW 14.6* 14.9* 15.1*    120* 127*   MPV 11.5 11.6 11.6          Last 3 Blood Glucose:   Recent Labs     02/13/23  0617 02/13/23  1852 02/14/23  0021 02/14/23  0629 02/14/23  1241 02/14/23  1725 02/15/23  0000 02/15/23  0612   GLUCOSE 132* 150* 140* 155* 181* 190* 202* 196*        PT/INR:    Lab Results   Component Value Date/Time    PROTIME 15.3 02/15/2023 06:12 AM    INR 1.5 02/15/2023 06:12 AM     PTT:    Lab Results   Component Value Date/Time    APTT 24.9 02/15/2023 06:12 AM       Comprehensive Metabolic Profile:   Recent Labs     02/14/23  1725 02/15/23  0000 02/15/23  0612   * 147* 144   K 3.7 3.7 3.7   CL 99 99 97*   CO2 27 26 28   BUN 85* 87* 90*   CREATININE 5.40* 5.31* 6.15*   GLUCOSE 190* 202* 196*   CALCIUM 6.5* 6.5* 6.2*   PROT 4.6* 4.5* 4.5*   LABALBU 2.5* 2.5* 2.2*   BILITOT 3.4* 3.1* 3.1*   ALKPHOS 293* 260* 243*   AST 1,009* 631* 443*   ALT 3,965* 3,335* 2,796*          Urinalysis:   Lab Results   Component Value Date/Time    NITRU NEGATIVE 02/15/2023 06:11 AM    COLORU Dark Yellow 02/15/2023 06:11 AM PHUR 6.5 02/15/2023 06:11 AM    WBCUA 10 TO 20 02/15/2023 06:11 AM    RBCUA 5 TO 10 02/15/2023 06:11 AM    MUCUS NOT REPORTED 11/15/2021 08:02 AM    TRICHOMONAS NOT REPORTED 11/15/2021 08:02 AM    YEAST NOT REPORTED 11/15/2021 08:02 AM    BACTERIA 3+ 02/11/2023 04:04 PM    SPECGRAV 1.014 02/15/2023 06:11 AM    LEUKOCYTESUR NEGATIVE 02/15/2023 06:11 AM    UROBILINOGEN Normal 02/15/2023 06:11 AM    BILIRUBINUR NEGATIVE 02/15/2023 06:11 AM    GLUCOSEU 1+ 02/15/2023 06:11 AM    KETUA NEGATIVE 02/15/2023 06:11 AM    AMORPHOUS NOT REPORTED 11/15/2021 08:02 AM       HgBA1c:  No results found for: LABA1C  TSH:    Lab Results   Component Value Date/Time    TSH 2.59 02/11/2023 08:25 PM     Lactic Acid:   Lab Results   Component Value Date/Time    LACTA 23.9 02/11/2023 04:02 PM       ASSESSMENT:     Patient Active Problem List    Diagnosis Date Noted    Respiratory arrest ([facility] [address] 02/11/2023    Cardiac arrest due to respiratory disorder ([facility] 02/11/2023    Acute respiratory failure with hypoxia and hypercapnia (HCC) 02/12/2023    Lactic acidosis 02/12/2023    Encephalopathy acute 02/12/2023    SAMUEL (acute kidney injury) ([facility] [address] 02/12/2023    Multifocal pneumonia 02/12/2023    Aspiration pneumonia of both lungs ([facility] [address] 02/12/2023    Bandemia 02/12/2023    Anaphylactic shock 02/11/2023    Social anxiety disorder of childhood 08/14/2014    Development disorder, mixed 08/14/2014    Obesity 08/14/2014        PLAN:     Anoxic brain injury  Neuro critical care consulted on the case  EEG did not show any cortical activity  CT head showed loss of gray-white differentiation and effacement of the sulci  [facility] is on the case for organ transplant, and is currently on CCA for the organ transplantation. Patient is breathing over the vent, so patient doesn't meet criteria for brain death     Respiratory arrest [facility]):  Secondary to anaphylactic reaction from amoxicillin. Patient is intubated. Currently on 100% FiO2.   AC/PRVC, RR 15, , PEEP of 5. ABG 7.4 2/50/465 33. Patient has increasing opacities concerning for aspiration pneumonia versus aspiration pneumonitis, on moxifloxacin and aztreonam, dosed by pharmacy  Lungs are accepted for transplant. Cardiac arrest due to respiratory disorder ([address]  Status post CPR, epi x3 and amiodarone 150 mg. Currently in normal sinus rhythm. Echo showed EF 60%, abnormal septal motion, no significant valvular regurgitation or stenosis seen. Plan for BRYAN pending cardiology. [facility] working on heart transplant. They recommend BRYAN. Drug-induced anaphylaxis, initial encounter  Anaphylactic reactions to amoxicillin. Suspected     Sepsis:  Secondary to aspiration pneumonia versus pharyngitis versus viral pharyngitis  ID consulted   Have patient on aztreonam and avelox    SAMUEL  -BUN 90 and Creat 6.15  -Renal functions continue to worsen.  -[facility] recommend CRRT for fluid management. Nephrology was consulted and recommend stopping bicarbonate drip and place Ceferino catheter. Combined metabolic and respiratory acidosis:   bicarbonate drip 75 mEq in D5 at 150 cc/h will be dc. Elevated Liver enzymes:  Secondary to shock liver. LFTs trending down     History of Down syndrome:  Baseline mentation unknown. DVT prophylaxis: Lovenox 40 mg daily. GI prophylaxis: protonix 40 mg twice daily.     Paola Esquivel MD  Internal Medicine Resident, PGY-2  [address]  9/57/9213,0:13 AM

## 2023-02-16 LAB
MICROORGANISM SPEC CULT: NORMAL
SERVICE CMNT-IMP: NORMAL
SPECIMEN DESCRIPTION: NORMAL

## 2023-02-16 NOTE — PROCEDURES
PROCEDURE NOTE - CENTRAL VENOUS LINE PLACEMENT    PATIENT NAME: Elena Cisneros RECORD NO. [id number]  DATE: 2/15/2023  ATTENDING PHYSICIAN: Mortimer Guys MD    PREOPERATIVE DIAGNOSIS:  Need for IV access  POSTOPERATIVE DIAGNOSIS:  Same  PROCEDURE PERFORMED:  Left Femoral Vein Central Line Insertion  PERFORMING PHYSICIAN: Samson Miranda MD  ANESTHESIA:  Local utilizing 1% lidocaine  ESTIMATED BLOOD LOSS:  Less than 25 ml  COMPLICATIONS:  None immediately appreciated. DISCUSSION:  Rodri Aiken is a 32y.o.-year-old male who requires central IV access. The history and physical examination were reviewed and confirmed. The diagnoses, proposed procedure, risks, possible complications, benefits and alternatives were discussed with the patient or family. He was given the opportunity to ask questions, and once answered, informed consent was obtained from the family. The patient was then prepared for the procedure. PROCEDURE:  A timeout was initiated by the bedside nurse and was confirmed by those present. The patient was placed in a supine position. The skin overlying the Left Femoral Vein was prepped with chlorhexadine and draped in sterile fashion. The skin was infiltrated with local anesthetic. The vessel and surrounding anatomy was visualized using ultrasound. Through the anesthetized region, the introducer needle was inserted into the femoral vein returning dark red non pulsatile blood. A guidewire was placed through the center of the needle with no resistance. Ultrasound confirmed presence of wire in the vein. A small incision made in the skin with a #11 scalpel blade. The dilator was inserted into the skin and vein over guidewire using Seldinger technique. The dilator was then removed and the 14F 20cm catheter was placed in the vein over the guidewire using Seldinger technique. The guidewire was then removed and all ports aspirated and flushed appropriately.  The catheter then secured using silk suture and a temporary sterile dressing was applied. No immediate complication was evident. All sponge, instrument and needle counts were correct at the completion of the procedure. The patient tolerated the procedure well with no immediate complication evident.      Zayra Valenzuela MD  Internal Medicine Resident, PGY-2  [address]  3/86/9037,8:74 PM

## 2023-02-16 NOTE — FLOWSHEET NOTE
2268: RN Spoke with  regarding plans for pt to be received at OR at noon on 12/16. All questions answered. No additional requirements for the nursing team per  Deana.     Electronically signed by Frances Franklin RN on 2/16/2023 at 3:08 AM

## 2023-02-16 NOTE — PROGRESS NOTES
INTENSIVE CARE UNIT  Resident Physician Progress Note    Patient - Rj Torres  Date of Admission -  2023  8:18 PM  Date of Evaluation -  2023  Room and Bed Number -  3024/3024-01   Hospital Day - 5      SUBJECTIVE:     OVERNIGHT EVENTS:      2/15/2023  Bronchoscopy was done to complete organ transplant protocol. Found to have moderate mucous plugging in the right lower lobe and left upper lobe/lingula. No endotracheal lesion seen. Patient seen and examined at bedside. No acute events were reported overnight. [facility] is on the case for organ transplant, and is currently on CCA for the organ transplantation. Plan is OR today at noon.     OBJECTIVE:     VITAL SIGNS:  /77   Pulse 97   Temp 97.5 °F (36.4 °C)   Resp 11   Ht 5' 7.01\" (1.702 m)   Wt 220 lb 7.4 oz (100 kg)   SpO2 98%   BMI 34.52 kg/m²   Tmax over 24 hours:  Temp (24hrs), Av.6 °F (37 °C), Min:97.5 °F (36.4 °C), Max:99.3 °F (37.4 °C)      Patient Vitals for the past 8 hrs:   BP Temp Temp src Pulse Resp SpO2 Weight   23 0916 -- -- -- 97 11 98 % --   23 0806 -- -- -- 93 15 99 % --   23 0801 -- -- -- 93 15 99 % --   23 0756 -- -- -- -- -- 100 % --   23 0600 -- 97.5 °F (36.4 °C) -- 99 15 98 % 220 lb 7.4 oz (100 kg)   23 0500 -- 97.7 °F (36.5 °C) -- 100 15 98 % --   23 0400 119/77 98.1 °F (36.7 °C) Bladder 100 15 100 % --   23 0338 -- -- -- (!) 101 15 100 % --   23 0331 -- -- -- (!) 102 15 99 % --   23 0300 -- 98.1 °F (36.7 °C) -- (!) 101 15 99 % --   23 0200 -- 98.2 °F (36.8 °C) -- (!) 104 15 97 % --         Intake/Output Summary (Last 24 hours) at 2023 0947  Last data filed at 2023 0600  Gross per 24 hour   Intake 1675.35 ml   Output 2680 ml   Net -1004.65 ml     Date 23 0000 - 23 2359   Shift 8447-8442 5162-2456 7518-1555 24 Hour Total   INTAKE   NG/GT(mL/kg) 0(0)   0(0)   Shift Total(mL/kg) 0(0)   0(0)   OUTPUT   Urine(mL/kg/hr) 95(0.1)   95   Shift Total(mL/kg) 95(1)   95(1)   Weight (kg) 100 100 100 100     Wt Readings from Last 3 Encounters:   02/16/23 220 lb 7.4 oz (100 kg)   02/11/23 207 lb (93.9 kg)   07/09/17 221 lb (100.2 kg)     Body mass index is 34.52 kg/m². PHYSICAL EXAM:    Physical Exam  Constitutional:       Comments: Intubated    Cardiovascular:      Rate and Rhythm: Regular rhythm. Pulses: Normal pulses. Pulmonary:      Comments: On vent, bl breath sounds present, Patient is breathing over the vent  Abdominal:      General: There is no distension. Tenderness: There is no abdominal tenderness. Skin:     Comments: Cool extremities    Neurological:      Comments: Pupils dilated & sluggish, no gag or corneal, does not withdraw to pain, grimacing, makes no purposeful movements, not requiring any sedation         MEDICATIONS:  Scheduled Meds:   aztreonam  1,000 mg IntraVENous Q12H    ipratropium-albuterol  1 ampule Inhalation Q4H    sodium chloride (Inhalant)  4 mL Nebulization Q8H    insulin lispro  0-8 Units SubCUTAneous Q4H    moxifloxacin  400 mg IntraVENous Q24H    sodium chloride  250 mL IntraVENous Once    pantoprazole (PROTONIX) 40 mg injection  40 mg IntraVENous Q12H    diphenhydrAMINE  25 mg IntraVENous q8h    sodium chloride flush  5-40 mL IntraVENous 2 times per day    enoxaparin  40 mg SubCUTAneous Daily     Continuous Infusions:   norepinephrine Stopped (02/12/23 2239)    dextrose      propofol Stopped (02/12/23 0704)    sodium chloride 10 mL/hr at 02/15/23 2204         SUPPORT DEVICES: x Ventilator BIPAP  nasal Cannula room Air    VENT SETTINGS (Comprehensive) (if applicable):   PRVC mode, FiO2 30%, PEEP 5, Respiratory Rate 15, Tidal Volume 500  Vent Information  Ventilator ID: [email or web address]  Equipment Changed: HME, Expiratory Filter  Vent Mode: AC/PRVC  Additional Respiratory Assessments  Heart Rate: 97  Resp: 11  SpO2: 98 %  End Tidal CO2: 37 (%)  Position: Semi-Cano's  Humidification Source: [facility]    ABGs:   Arterial Blood Gas result:  pH 7.474; pCO2 44; pO2 508.4; HCO3 32.3; BE4; %O2 Sat 98.   Lab Results   Component Value Date/Time    FIO2 100.0 02/16/2023 08:17 AM       DATA:  Complete Blood Count:   Recent Labs     02/15/23  0612 02/15/23  1943 02/16/23  0610   WBC 23.4* 25.2* 18.7*   RBC 3.87* 4.09* 3.14*   HGB 11.9* 12.5* 9.7*   HCT 33.9* 36.0* 27.6*   MCV 87.6 88.0 87.9   MCH 30.7 30.6 30.9   MCHC 35.1* 34.7 35.1*   RDW 15.1* 15.0* 14.7*   * 131* 143   MPV 11.6 12.2 12.4        Last 3 Blood Glucose:   Recent Labs     02/14/23  1241 02/14/23  1725 02/15/23  0000 02/15/23  0612 02/15/23  1312 02/15/23  1944 02/15/23  2358 02/16/23  0610   GLUCOSE 181* 190* 202* 196* 179* 149* 132* 125*      PT/INR:    Lab Results   Component Value Date/Time    PROTIME 15.8 02/16/2023 06:10 AM    INR 1.5 02/16/2023 06:10 AM     PTT:    Lab Results   Component Value Date/Time    APTT 25.5 02/16/2023 06:10 AM       Comprehensive Metabolic Profile:   Recent Labs     02/15/23  1944 02/15/23  2358 02/16/23  0610    141 143   K 3.7 4.0 3.9   CL 95* 97* 97*   CO2 28 30 29   BUN 56* 68* 74*   CREATININE 4.15* 5.22* 5.96*   GLUCOSE 149* 132* 125*   CALCIUM 7.3* 6.9* 7.0*   PROT 5.5* 4.7* 4.6*   LABALBU 2.7* 2.4* 2.2*   BILITOT 3.7* 3.4* 3.4*   ALKPHOS 261* 203* 187*   * 203* 164*   ALT 2,364* 1,712* 1,452*        Urinalysis:   Lab Results   Component Value Date/Time    NITRU NEGATIVE 02/15/2023 06:11 AM    COLORU Dark Yellow 02/15/2023 06:11 AM    PHUR 6.5 02/15/2023 06:11 AM    WBCUA 10 TO 20 02/15/2023 06:11 AM    RBCUA 5 TO 10 02/15/2023 06:11 AM    MUCUS NOT REPORTED 11/15/2021 08:02 AM    TRICHOMONAS NOT REPORTED 11/15/2021 08:02 AM    YEAST NOT REPORTED 11/15/2021 08:02 AM    BACTERIA 3+ 02/11/2023 04:04 PM    SPECGRAV 1.014 02/15/2023 06:11 AM    LEUKOCYTESUR NEGATIVE 02/15/2023 06:11 AM    UROBILINOGEN Normal 02/15/2023 06:11 AM    BILIRUBINUR NEGATIVE 02/15/2023 06:11 AM    GLUCOSEU 1+ 02/15/2023 06:11 AM    KETUA NEGATIVE 02/15/2023 06:11 AM    AMORPHOUS NOT REPORTED 11/15/2021 08:02 AM       HgBA1c:  No results found for: LABA1C  TSH:    Lab Results   Component Value Date/Time    TSH 2.59 02/11/2023 08:25 PM     Lactic Acid:   Lab Results   Component Value Date/Time    LACTA 23.9 02/11/2023 04:02 PM       ASSESSMENT:     Patient Active Problem List    Diagnosis Date Noted    Respiratory arrest ([facility] [address] 02/11/2023    Cardiac arrest due to respiratory disorder ([facility] [address] 02/11/2023    Arterial hypotension 02/15/2023    Pulmonary vascular congestion 02/15/2023    Acute respiratory failure with hypoxia and hypercapnia (HCC) 02/12/2023    Lactic acidosis 02/12/2023    Encephalopathy acute 02/12/2023    SAMUEL (acute kidney injury) ([facility] [address] 02/12/2023    Multifocal pneumonia 02/12/2023    [facility] 02/12/2023    Bandemia 02/12/2023    Anaphylactic shock 02/11/2023    Social anxiety disorder of childhood 08/14/2014    Development disorder, mixed 08/14/2014    Obesity 08/14/2014        PLAN:     Anoxic brain injury  Neuro critical care consulted on the case  EEG did not show any cortical activity  CT head showed loss of gray-white differentiation and effacement of the sulci  [facility] is on the case for organ transplant, and is currently on CCA for the organ transplantation. Plan is OR today at noon. Patient is breathing over the vent, so patient doesn't meet criteria for brain death. Respiratory arrest [facility]):  Secondary to anaphylactic reaction from amoxicillin. Patient is intubated. Currently on 100% FiO2. AC/PRVC, RR 15, , PEEP of 5. ABG 7.47/44/508.4  Patient has increasing opacities concerning for aspiration pneumonia versus aspiration pneumonitis, on moxifloxacin and aztreonam, dosed by pharmacy  Lungs are accepted for transplant. Cardiac arrest due to respiratory disorder ([facility] [address]  Status post CPR, epi x3 and amiodarone 150 mg. Currently in normal sinus rhythm.   BRYAN showed EF 55%, no thrombus or valvular vegetation identified. No intracardiac shunt via color doppler. [facility] working on heart transplant. Drug-induced anaphylaxis, initial encounter  Anaphylactic reactions to amoxicillin. Suspected     Sepsis:  Secondary to aspiration pneumonia versus pharyngitis versus viral pharyngitis  ID consulted   Have patient on aztreonam and avelox    SAMUEL  -BUN 74 and Creat 5.96  -Renal functions continue to worsen.  -[facility] recommend CRRT for fluid management. Nephrology was consulted and recommend stopping bicarbonate drip and place Ceferino catheter. Combined metabolic and respiratory acidosis:   bicarbonate drip 75 mEq in D5 at 150 cc/h dc. Elevated Liver enzymes:  Secondary to shock liver. LFTs trending down     History of Down syndrome:  Baseline mentation unknown. DVT prophylaxis: Lovenox 40 mg daily. GI prophylaxis: protonix 40 mg twice daily.     Landon Lam MD  EM Resident PGY-1  Intensive Care Unit  2/16/2023 9:54 AM

## 2023-02-16 NOTE — ANESTHESIA PRE PROCEDURE
Department of Anesthesiology  Preprocedure Note       Name:  Corey De Leon   Age:  32 y.o.  :  1995                                          MRN:  [id number]         Date:  2023      Surgeon: Consuelo Simon):  [facility]    Procedure: ORGAN PROCUREMENT - HEART AND LUNGS      Medications prior to admission:   Prior to Admission medications    Medication Sig Start Date End Date Taking? Authorizing Provider   Cholecalciferol (VITAMIN D3 PO) Take by mouth daily   Yes Historical Provider, MD   atorvastatin (LIPITOR) 20 MG tablet Take 20 mg by mouth daily    Historical Provider, MD   Polyethylene Glycol 3350 (MIRALAX PO) Take by mouth as needed    Historical Provider, MD   cloNIDine (CATAPRES) 0.2 MG tablet Take 1 tablet by mouth 2 times daily  Patient taking differently: Take 0.2 mg by mouth daily 5/8/15   Betzaida Shafer MD   loratadine (CLARITIN) 10 MG tablet Take 1 tablet by mouth daily 5/8/15   Betzaida Shafer MD   fluticasone (FLONASE) 50 MCG/ACT nasal spray 1 spray by Nasal route daily 5/8/15   Betzaida Shafer MD   erythromycin with ethanol (EMGEL) 2 % gel Apply topically daily.  14   Betzaida Shafer MD       Current medications:    Current Facility-Administered Medications   Medication Dose Route Frequency Provider Last Rate Last Admin    morphine (PF) injection 2 mg  2 mg IntraVENous Q2H PRN Mamta Cruz MD        Or    morphine injection 4 mg  4 mg IntraVENous Q2H PRN Robyn Cruz MD        glycopyrrolate injection 0.2 mg  0.2 mg IntraVENous Q4H PRN Robyn Cruz MD        LORazepam (ATIVAN) 2 MG/ML concentrated solution 1 mg  1 mg Oral Q2H PRN Rboyn Cruz MD        heparin (porcine) injection 30,000 Units  30,000 Units IntraCATHeter Once Erin Booth MD        heparin (porcine) injection 1,600 Units  1,600 Units IntraCATHeter PRN Kathy Garg MD   1,600 Units at 02/15/23 1837    heparin (porcine) injection 1,900 Units  1,900 Units IntraCATHeter PRN Kathy Garg MD   1,900 Units at 02/15/23 1826    aztreonam (AZACTAM) 1,000 mg in sodium chloride 0.9 % 50 mL IVPB (mini-bag)  1,000 mg IntraVENous Q12H Wesley Samson MD   Stopped at 02/15/23 2033    ipratropium-albuterol (DUONEB) nebulizer solution 1 ampule  1 ampule Inhalation Q4H Wesley Samson MD   1 ampule at 02/16/23 0801    sodium chloride (Inhalant) 3 % nebulizer solution 4 mL  4 mL Nebulization Q8H Wesley Samson MD   4 mL at 02/16/23 0013    norepinephrine (LEVOPHED) 16 mg in sodium chloride 0.9 % 250 mL infusion  2-100 mcg/min IntraVENous Continuous Antony Roy MD   Stopped at 02/12/23 2239    fentaNYL (SUBLIMAZE) injection 50 mcg  50 mcg IntraVENous Q1H PRN Maya Paul MD   50 mcg at 02/12/23 6032    glucose chewable tablet 16 g  4 tablet Oral PRN Flaco Sanchez MD        dextrose bolus 10% 125 mL  125 mL IntraVENous PRN Flaco Sanchez MD        Or    dextrose bolus 10% 250 mL  250 mL IntraVENous PRN Flaco Sanchez MD        glucagon (rDNA) injection 1 mg  1 mg SubCUTAneous PRN Flaco Sanchez MD        dextrose 10 % infusion   IntraVENous Continuous PRN Flaco Sanchez MD        insulin lispro (HUMALOG) injection vial 0-8 Units  0-8 Units SubCUTAneous Q4H Kartik Gutierrez MD        lubrifresh P.M. (artificial tears) ophthalmic ointment   Both Eyes PRN Lucrecia Bravo MD   Given at 02/15/23 2219    moxifloxacin (AVELOX) IVPB 400 mg  400 mg IntraVENous Q24H Maya Paul  mL/hr at 02/16/23 0614 400 mg at 02/16/23 7558    0.9 % sodium chloride bolus  250 mL IntraVENous Once Eric Franco MD   Stopped at 02/12/23 1843    pantoprazole (PROTONIX) 40 mg in sodium chloride (PF) 0.9 % 10 mL injection  40 mg IntraVENous Q12H Antony Roy MD   40 mg at 02/15/23 2213    propofol injection  5-50 mcg/kg/min IntraVENous Continuous E Tavares Ledezma MD   Stopped at 02/12/23 0704    diphenhydrAMINE (BENADRYL) injection 25 mg  25 mg IntraVENous q8h Antony Roy MD   25 mg at 02/16/23 0615    sodium chloride flush 0.9 % injection 5-40 mL  5-40 mL IntraVENous 2 times per day Lea Green MD   10 mL at 02/15/23 1958    sodium chloride flush 0.9 % injection 5-40 mL  5-40 mL IntraVENous PRN Lea Green MD        0.9 % sodium chloride infusion   IntraVENous PRN Lea Green MD 10 mL/hr at 02/15/23 2204 Rate Verify at 02/15/23 2204    enoxaparin (LOVENOX) injection 40 mg  40 mg SubCUTAneous Daily Lea Green MD   40 mg at 02/15/23 0814    ondansetron (ZOFRAN-ODT) disintegrating tablet 4 mg  4 mg Oral Q8H PRN Lea Green MD        Or    ondansetron (ZOFRAN) injection 4 mg  4 mg IntraVENous Q6H PRN Lea Green MD        polyethylene glycol (GLYCOLAX) packet 17 g  17 g Oral Daily PRN Lea Green MD        potassium chloride 20 mEq/50 mL IVPB (Central Line)  20 mEq IntraVENous PRN Lea Green MD        Or    potassium chloride 10 mEq/100 mL IVPB (Peripheral Line)  10 mEq IntraVENous PRN Lea Green MD        magnesium sulfate 2000 mg in 50 mL IVPB premix  2,000 mg IntraVENous PRN Lea Green MD        sodium phosphate 10 mmol in sodium chloride 0.9 % 250 mL IVPB  10 mmol IntraVENous PRN Lea Green MD        Or    sodium phosphate 15 mmol in sodium chloride 0.9 % 250 mL IVPB  15 mmol IntraVENous PRN Lea Green MD        Or    sodium phosphate 20 mmol in sodium chloride 0.9 % 500 mL IVPB  20 mmol IntraVENous PRN Lea Green MD           Allergies: Allergies   Allergen Reactions    Amoxicillin Anaphylaxis    Bactrim [Sulfamethoxazole-Trimethoprim]     Clindamycin/Lincomycin        Problem List:    Patient Active Problem List   Diagnosis Code    Social anxiety disorder of childhood F40.10    Development disorder, mixed F88    Obesity E66.9    Anaphylactic shock T78. 2XXA    Respiratory arrest ([facility] [address] R09.2    Cardiac arrest due to respiratory disorder (HCC) J98.9, I46.8    Acute respiratory failure with hypoxia and hypercapnia (HCC) J96.01, J96.02    Lactic acidosis E87.20    Encephalopathy acute G93.40  [facility] N17.9    Multifocal pneumonia J18.9    Aspiration pneumonia of both lungs (HCC) J69.0    Bandemia D72.825    Arterial hypotension I95.9    Pulmonary vascular congestion R09.89       Past Medical History:        Diagnosis Date    Allergic rhinitis     Anxiety     MR (mental retardation)     [facility]/11[facility]    Sleep difficulties        Past Surgical History:  No past surgical history on file. Social History:    Social History     Tobacco Use    Smoking status: Never    Smokeless tobacco: Not on file   Substance Use Topics    Alcohol use: No                                Counseling given: Not Answered      Vital Signs (Current):   Vitals:    02/16/23 0756 02/16/23 0801 02/16/23 0806 02/16/23 0916   BP:       Pulse:  93 93 97   Resp:  15 15 11   Temp:       TempSrc:       SpO2: (S) 100% 99% 99% 98%   Weight:       Height:                                                  BP Readings from Last 3 Encounters:   02/16/23 119/77   02/11/23 126/68   02/11/23 110/69       NPO Status:                                                                                 BMI:   Wt Readings from Last 3 Encounters:   02/16/23 220 lb 7.4 oz (100 kg)   02/11/23 207 lb (93.9 kg)   07/09/17 221 lb (100.2 kg)     Body mass index is 34.52 kg/m².     CBC:   Lab Results   Component Value Date/Time    WBC 18.7 02/16/2023 06:10 AM    RBC 3.14 02/16/2023 06:10 AM    HGB 9.7 02/16/2023 06:10 AM    HCT 27.6 02/16/2023 06:10 AM    MCV 87.9 02/16/2023 06:10 AM    RDW 14.7 02/16/2023 06:10 AM     02/16/2023 06:10 AM       CMP:   Lab Results   Component Value Date/Time     02/16/2023 06:10 AM    K 3.9 02/16/2023 06:10 AM    CL 97 02/16/2023 06:10 AM    CO2 29 02/16/2023 06:10 AM    BUN 74 02/16/2023 06:10 AM    CREATININE 5.96 02/16/2023 06:10 AM    GFRAA >60 03/22/2022 11:46 AM    LABGLOM 12 02/16/2023 06:10 AM    GLUCOSE 125 02/16/2023 06:10 AM    PROT 4.6 02/16/2023 06:10 AM    CALCIUM 7.0 02/16/2023 06:10 AM    BILITOT 3.4 02/16/2023 06:10 AM    ALKPHOS 187 02/16/2023 06:10 AM     02/16/2023 06:10 AM    ALT 1,452 02/16/2023 06:10 AM       POC Tests:   Recent Labs     02/16/23  0416   POCGLU 110*       Coags:   Lab Results   Component Value Date/Time    PROTIME 15.8 02/16/2023 06:10 AM    INR 1.5 02/16/2023 06:10 AM    APTT 25.5 02/16/2023 06:10 AM       HCG (If Applicable): No results found for: PREGTESTUR, PREGSERUM, HCG, HCGQUANT     ABGs: No results found for: PHART, PO2ART, GHG6JSU, YQK6ASS, BEART, W0ZGTRPE     Type & Screen (If Applicable):  No results found for: LABABO, LABRH    Drug/Infectious Status (If Applicable):  Lab Results   Component Value Date/Time    HEPCAB NONREACTIVE 02/15/2023 01:12 PM       COVID-19 Screening (If Applicable):   Lab Results   Component Value Date/Time    COVID19 Not Detected 02/16/2023 12:30 AM    COVID19 Not Detected 02/12/2023 10:51 AM           Anesthesia Evaluation    Airway:           Dental:          Pulmonary:   (+) pneumonia:                             Cardiovascular:                      Neuro/Psych:   (+) psychiatric history:            GI/Hepatic/Renal:             Endo/Other:                     Abdominal:             Vascular: Other Findings:           Anesthesia Plan      general     ASA 6       Induction: inhalational and intravenous. Anesthetic plan and risks discussed with healthcare power of . Plan discussed with CRNA.                     Stalin Petty MD   2/16/2023

## 2023-02-16 NOTE — PROGRESS NOTES
Neuro Critical Care Progress Note    Reason for Consult:  Post cardiac arrest  Requesting Physician: Dr. Lori Hawley  Attending Physician: Dr. Cesar Sun    History Obtained From:  [facility] Le[facility] record    24 hour :       No acute changes, patient is still able to breath on CPAP    HISTORY OF PRESENT ILLNESS:       The patient is a 32 y.o. male with history of down syndrome who presents as a transfer from [facility] for cardiac arrest. Patient reportedly had been sick with upper respiratory symptoms for the past week, was prescribed amoxicillin and decadron and discharged home. After taking his first dose of amoxicillin, patient developed difficulty breathing and altered mentation. Upon EMS arrival, he was found cyanotic with pulse and bag ventilation was started. En route to the ER, patient lost pulses, unclear initial rhythm. CPR was done for ? 20 minutes with ROSC achieved. He then went into an unstable wide complex tachycardia with SBP 50s. 150 mg amiodarone administered and patient was subsequently intubated. Started on an epinephrine infusion [facility] Ad[facility], started on antibiotics and steroids pneumonia. Epinephrine infusion discontinued, started on bicarb infusion. Off sedation since 7 am yesterday. 2/12: On exam, patient open eyes spontaneously, dysconjugate gaze. Does not track or follow commands. Pupils equal and reactive. Absent corneals, cough/gag. No movement to noxious stimulation. 2/13: Overnight, patient had acute pupillary change with right pupil 3 mm and left 5 mm. Right was initially reactive, left was not. Stat CTH obtained and showed diffuse anoxic brain injury. Pupils then became bilaterally 5-6 mm and unreactive. On my exam this am, patient has no corneal, cough, gag or response to noxious stimuli. LTME flat.      2/14: No changes overnight, patients pupils are 7mm and non-reactive to light, no cough, gag, or corneal reflexes present, patients family elected change code status to [facility].     2/15: Exam remains unchanged, patient continues to have respiratory effort and initiates breaths on pressure support PS 8 above peep. Per nursing staff the plan is for withdraw of care, timeline to be determined. PAST MEDICAL HISTORY :       Past Medical History:        Diagnosis Date    Allergic rhinitis     Anxiety     MR (mental retardation)     Respiratory arrest ([facility] [address] 2/11/2023    Sleep difficulties        Past Surgical History:    No past surgical history on file. Social History:   Social History     Socioeconomic History    Marital status: Single     Spouse name: Not on file    Number of children: Not on file    Years of education: Not on file    Highest education level: Not on file   Occupational History    Not on file   Tobacco Use    Smoking status: Never    Smokeless tobacco: Not on file   Substance and Sexual Activity    Alcohol use: No    Drug use: No    Sexual activity: Never   Other Topics Concern    Not on file   Social History Narrative    Not on file     Social Determinants of Health     Financial Resource Strain: Not on file   Food Insecurity: Not on file   Transportation Needs: Not on file   Physical Activity: Not on file   Stress: Not on file   Social Connections: Not on file   Intimate Partner Violence: Not on file   Housing Stability: Not on file       Family History:       Problem Relation Age of Onset    Coronary Art Dis Father     High Blood Pressure Father     High Cholesterol Father     Cancer Paternal Grandfather         colon       Allergies:  Amoxicillin, Bactrim [sulfamethoxazole-trimethoprim], and Clindamycin/lincomycin    Home Medications:  Prior to Admission medications    Medication Sig Start Date End Date Taking?  Authorizing Provider   Cholecalciferol (VITAMIN D3 PO) Take by mouth daily   Yes Historical Provider, MD   atorvastatin (LIPITOR) 20 MG tablet Take 20 mg by mouth daily Historical Provider, MD   Polyethylene Glycol 3350 (MIRALAX PO) Take by mouth as needed    Historical Provider, MD   cloNIDine (CATAPRES) 0.2 MG tablet Take 1 tablet by mouth 2 times daily  Patient taking differently: Take 0.2 mg by mouth daily 5/8/15   Mami Mason MD   loratadine (CLARITIN) 10 MG tablet Take 1 tablet by mouth daily 5/8/15   Mami Mason MD   fluticasone (FLONASE) 50 MCG/ACT nasal spray 1 spray by Nasal route daily 5/8/15   Mami Mason MD   erythromycin with ethanol (EMGEL) 2 % gel Apply topically daily.  11/4/14   Mami Mason MD       Current Medications:   Current Facility-Administered Medications: glycopyrrolate injection 0.2 mg, 0.2 mg, IntraVENous, Q4H PRN  heparin (porcine) injection 30,000 Units, 30,000 Units, IntraCATHeter, Once  LORazepam (ATIVAN) injection 1 mg, 1 mg, IntraVENous, Q15 Min PRN  morphine (PF) injection 2 mg, 2 mg, IntraVENous, Q15 Min PRN **OR** morphine injection 4 mg, 4 mg, IntraVENous, Q15 Min PRN  heparin (porcine) injection 1,600 Units, 1,600 Units, IntraCATHeter, PRN  heparin (porcine) injection 1,900 Units, 1,900 Units, IntraCATHeter, PRN  aztreonam (AZACTAM) 1,000 mg in sodium chloride 0.9 % 50 mL IVPB (mini-bag), 1,000 mg, IntraVENous, Q12H  ipratropium-albuterol (DUONEB) nebulizer solution 1 ampule, 1 ampule, Inhalation, Q4H  sodium chloride (Inhalant) 3 % nebulizer solution 4 mL, 4 mL, Nebulization, Q8H  norepinephrine (LEVOPHED) 16 mg in sodium chloride 0.9 % 250 mL infusion, 2-100 mcg/min, IntraVENous, Continuous  fentaNYL (SUBLIMAZE) injection 50 mcg, 50 mcg, IntraVENous, Q1H PRN  glucose chewable tablet 16 g, 4 tablet, Oral, PRN  dextrose bolus 10% 125 mL, 125 mL, IntraVENous, PRN **OR** dextrose bolus 10% 250 mL, 250 mL, IntraVENous, PRN  glucagon (rDNA) injection 1 mg, 1 mg, SubCUTAneous, PRN  dextrose 10 % infusion, , IntraVENous, Continuous PRN  insulin lispro (HUMALOG) injection vial 0-8 Units, 0-8 Units, SubCUTAneous, Q4H  lubrifresh P.M. (artificial tears) ophthalmic ointment, , Both Eyes, PRN  moxifloxacin (AVELOX) IVPB 400 mg, 400 mg, IntraVENous, Q24H  0.9 % sodium chloride bolus, 250 mL, IntraVENous, Once  pantoprazole (PROTONIX) 40 mg in sodium chloride (PF) 0.9 % 10 mL injection, 40 mg, IntraVENous, Q12H  propofol injection, 5-50 mcg/kg/min, IntraVENous, Continuous  diphenhydrAMINE (BENADRYL) injection 25 mg, 25 mg, IntraVENous, q8h  sodium chloride flush 0.9 % injection 5-40 mL, 5-40 mL, IntraVENous, 2 times per day  sodium chloride flush 0.9 % injection 5-40 mL, 5-40 mL, IntraVENous, PRN  0.9 % sodium chloride infusion, , IntraVENous, PRN  enoxaparin (LOVENOX) injection 40 mg, 40 mg, SubCUTAneous, Daily  ondansetron (ZOFRAN-ODT) disintegrating tablet 4 mg, 4 mg, Oral, Q8H PRN **OR** ondansetron (ZOFRAN) injection 4 mg, 4 mg, IntraVENous, Q6H PRN  polyethylene glycol (GLYCOLAX) packet 17 g, 17 g, Oral, Daily PRN  potassium chloride 20 mEq/50 mL IVPB (Central Line), 20 mEq, IntraVENous, PRN **OR** potassium chloride 10 mEq/100 mL IVPB (Peripheral Line), 10 mEq, IntraVENous, PRN  magnesium sulfate 2000 mg in 50 mL IVPB premix, 2,000 mg, IntraVENous, PRN  sodium phosphate 10 mmol in sodium chloride 0.9 % 250 mL IVPB, 10 mmol, IntraVENous, PRN **OR** sodium phosphate 15 mmol in sodium chloride 0.9 % 250 mL IVPB, 15 mmol, IntraVENous, PRN **OR** sodium phosphate 20 mmol in sodium chloride 0.9 % 500 mL IVPB, 20 mmol, IntraVENous, PRN    REVIEW OF SYSTEMS:       Unable to assess due to current condition    PHYSICAL EXAM:       /77   Pulse 96   Temp 97.5 °F (36.4 °C)   Resp 14   Ht 5' 7.01\" (1.702 m)   Wt 220 lb 7.4 oz (100 kg)   SpO2 99%   BMI 34.52 kg/m²       CONSTITUTIONAL:  Intubated, off sedation,  Does not track or follow commands.     HEAD:  normocephalic, atraumtic   EYES:  Pupils 7 mm unreactive   ENT:  moist mucous membranes   NECK:  supple, symmetric   LUNGS:  Equal air entry bilaterally, course breath sounds CARDIOVASCULAR:  normal s1 / s2, tachycardic    ABDOMEN:  Soft, no rigidity   NEUROLOGIC:  Mental Status:  Intubated, off sedation, no eye opening, no spontaneous movement             Cranial Nerves:    III: Pupils:  fixed and dilated   V: Corneal reflex:  completed. abnormal absent bilaterally  Absent cough/gag    Motor Exam:    No movement to noxious stimulation   SKIN:  no rash        LABS AND IMAGING:     CBC with Differential:    Lab Results   Component Value Date/Time    WBC 18.7 02/16/2023 06:10 AM    RBC 3.14 02/16/2023 06:10 AM    HGB 9.7 02/16/2023 06:10 AM    HCT 27.6 02/16/2023 06:10 AM     02/16/2023 06:10 AM    MCV 87.9 02/16/2023 06:10 AM    MCH 30.9 02/16/2023 06:10 AM    MCHC 35.1 02/16/2023 06:10 AM    RDW 14.7 02/16/2023 06:10 AM    NRBC 1 02/13/2023 06:52 PM    LYMPHOPCT 3 02/14/2023 06:29 AM    MONOPCT 6 02/14/2023 06:29 AM    BASOPCT 0 02/14/2023 06:29 AM    MONOSABS 2.12 02/14/2023 06:29 AM    LYMPHSABS 1.06 02/14/2023 06:29 AM    EOSABS 0.00 02/14/2023 06:29 AM    BASOSABS 0.00 02/14/2023 06:29 AM    DIFFTYPE NOT REPORTED 04/25/2019 10:39 AM     BMP:    Lab Results   Component Value Date/Time     02/16/2023 06:10 AM    K 3.9 02/16/2023 06:10 AM    CL 97 02/16/2023 06:10 AM    CO2 29 02/16/2023 06:10 AM    BUN 74 02/16/2023 06:10 AM    LABALBU 2.2 02/16/2023 06:10 AM    CREATININE 5.96 02/16/2023 06:10 AM    CALCIUM 7.0 02/16/2023 06:10 AM    GFRAA >60 03/22/2022 11:46 AM    LABGLOM 12 02/16/2023 06:10 AM    GLUCOSE 125 02/16/2023 06:10 AM       Radiology Review:    XR CHEST PORTABLE   Final Result      Endotracheal tube and NG tube are in satisfactory position. Extensive consolidative change within the left lower lobe and right lower   lobe. Small bilateral pleural effusion. XR ABDOMEN FOR NG/OG/NE TUBE PLACEMENT   Preliminary Result   Nasogastric tube tip is in the stomach.          XR CHEST PORTABLE   Final Result   Pulmonary congestion and bilateral effusions. Support tubes as described above. XR CHEST PORTABLE   Final Result   Stable exam.         XR CHEST (SINGLE VIEW FRONTAL)   Final Result   No significant interval change. Unchanged positioning of support devices. XR CHEST PORTABLE   Final Result   Endotracheal tube tip is just above the mika and should be retracted   approximately 1-2 cm. Otherwise stable exam.      The findings were sent to the Radiology Results [address] at 12:37   pm on 2/14/2023 to be communicated to a licensed caregiver. XR CHEST PORTABLE   Final Result   Mild cardiomegaly with central pulmonary edema. Support tubes and catheters as noted above. CT CHEST ABDOMEN PELVIS WO CONTRAST Additional Contrast? None   Final Result   Small pericardial effusion. Moderate left and small right pleural effusion. Extensive consolidative   change of left lower lobe and moderate consolidative change of right lower   lobe likely related to atelectasis. Mild amount of ascites within the peritoneal cavity. Extensive stranding through the intraperitoneal and retroperitoneal spaces. Finding may be related to fluid overload. XR CHEST PORTABLE   Final Result   Addendum (preliminary) 1 of 1   ADDENDUM:   Addendum is being made for measurement of aortic knob white and diaphragm   width. Aortic knob width is 27 mm.  diaphragm width is 33 cm. Final      Support lines and devices are stable and in satisfactory position      Interval worsening of focal consolidative change and ground-glass densities   within the left lower lobe and interval development of right parahilar and   infrahilar ground-glass densities. Right lung measures 19.7 x 12 cm and left lung measures 19.2x9 cm. CT HEAD WO CONTRAST   Final Result   Addendum (preliminary) 1 of 1   ADDENDUM:   Results conveyed to Dr. Veronica Montes at 9:08 pm on 2/12/2023.          Final   CT findings most consistent with diffuse anoxic brain injury. The findings were sent to the Radiology Results [address] at 9:04   pm on 2/12/2023 to be communicated to a licensed caregiver. XR CHEST (SINGLE VIEW FRONTAL)   Final Result   1. Interval placement of right IJ approach central line, tip overlying the   SVC region. No evidence of pneumothorax. 2. Patchy bilateral infrahilar opacities. Similar on the right and   intervally worse on the left. XR ABDOMEN FOR NG/OG/NE TUBE PLACEMENT   Final Result   Enteric tube in appropriate position. CT CHEST PULMONARY EMBOLISM W CONTRAST   Final Result   No evidence of pulmonary embolism or acute aortic disease. Pulmonary   infiltrates in both lower lobes with atelectatic changes. Focal right   perihilar right upper lobe infiltrate. Mild pericardial effusion. Mild right hilar lymphadenopathy. Life support system appear stable with endotracheal and nasogastric tubes in   good position. XR CHEST PORTABLE   Final Result   Endotracheal tube is barely above the mika. NG tube is in the stomach but   the side hole is not seen. ASSESSMENT AND PLAN:       31 yo male with history of down syndrome (unclear baseline) who presents post cardiac arrest related to anaphylactic reaction to amoxicillin. Has been ill with upper respiratory symptoms for past 5-6 days and was discharged home yesterday with amoxicillin and steroids. Shortly after receiving his first dose of antibiotics, he developed respiratory distress and AMS. Upon EMS arrival, patient was cyanotic and lost pulses shortly thereafter. 20 minutes of CPR with ROSC. Followed by unstable narrow complex rhythm, given 150 mg amiodarone. Transferred from [facility] ED and admitted to MICU. Neuro ICU consult for neuro prognostication.      - patient has lost all brain stem reflexes except for respiratory trigger, he can still breath on CPAP comfortably, therefore patient does not meet the criteria for brain death by neurological exam  - family like to move forward with comfort care and terminally extubated, it will be coordinated with [facility] for DCD  - please call us if there is any question    We will continue to follow along. For any changes in exam or patient status please contact Neuro Critical Care.       Perri Dickinson MD  Neuro Critical Care  2/16/2023     11:51 AM

## 2023-02-16 NOTE — PROGRESS NOTES
Steven Horta, Am[place]ney [place] with [address] [place] reached out to Dr Jasvir Quan with [facility] on 02/14/2023. Patient IS a coroners case, Dr Jasvir Quan granted permission for organ recovery. Admission blood held at [facility] RN confirmed OR time with coroners office overnight.

## 2023-02-16 NOTE — DISCHARGE SUMMARY
[facility]     Department of Internal Medicine - Critical Care Service    INPATIENT DEATH SUMMARY      PATIENT IDENTIFICATION:  NAME:  Ese Tan   :   1995  MRN:    [id number]     Acct:    [de-identified]   Admit Date:  2023   date:  2023 at 13:41   Attending Provider: Rosetta Mane MD                                     Principal Problem:    Respiratory arrest [facility])  Active Problems:    Cardiac arrest due to respiratory disorder ([facility] [address]    Anaphylactic shock    Acute respiratory failure with hypoxia and hypercapnia (HCC)    Lactic acidosis    Encephalopathy acute    SAMUEL (acute kidney injury) ([facility] [address]    Multifocal pneumonia    Aspiration pneumonia of both lungs ([facility] [address]    Bandemia    Arterial hypotension    Pulmonary vascular congestion  Resolved Problems:    * No resolved hospital problems. *       REASON FOR HOSPITALIZATION:   Chief Complaint   Patient presents with    Respiratory Arrest     Post arrest now intubated           Hospital Course  Patient was admitted to ICU. Blood cultures urine culture, and COVID test was obtained. CBC, CMP, magnesium, phosphorus, arterial blood gases, APTT, INR, lactic acid was obtained. Chest x-ray was obtained and showed pulmonary congestion and bilateral effusions. Neuro critical care was consulted. EEG was performed and did not show any cortical activity. CT head was performed and showed loss of gray-white differentiation and effacement of the sulci. Patient was started on moxifloxacin and aztreonam as there was concern for aspiration pneumonia versus aspiration pneumonitis. [facility] was contacted and patient was made CCA for organ transplantation. BRYAN was performed. For the patient's SAMUEL a [facility] recommended CRRT for fluid management. Nephrology was consulted. Patient was on a bicarb drip 75 mEq in D5 at 150 mL/h. Lovenox 40 mg daily for DVT prophylaxis and Protonix 40 mg twice daily for GI prophylaxis. Patient was brought to the OR on 2023 at 12 PM for extubation and to have his organs harvested. Patient was given 4 mg morphine and 2 mg Ativan for comfort. Patient was extubated. Family was called to bedside and they were able to say their goodbyes. After 14 minutes postextubation patient went into asystole. Patient did not have a palpable carotid pulse and there was no heartbeat or respirations heard on auscultation. 5 minutes was given for auto-resuscitation. After 5 minutes patient still did not have a palpable carotid pulse and there was no heartbeat or respirations heard on auscultation. Time of death was pronounced at 13:41 on 2023. Patient was then transferred to OR bed to have his organs harvested.     Consults:   cardiology, ID, nephrology, neuro critical care, [facility]    Procedures: Intubation, central line, arterial line    Any Hospital Acquired Infections: No    PATIENT'S DISCHARGE CONDITION:        Disposition: Joselito Ayala MD

## 2023-02-16 NOTE — ANESTHESIA POSTPROCEDURE EVALUATION
Department of Anesthesiology  Postprocedure Note    Patient: Zheng Barrett  MRN: [id number]  YOB: 1995  Date of evaluation: 2023      Procedure Summary     Date: 23 Room / Location: [facility]    Anesthesia Start: 1441 Anesthesia Stop: 8431    Procedure: ORGAN PROCUREMENT - HEART AND LUNGS Diagnosis:       Brain death      (Brain death [G93.82])    Surgeons: [facility] Responsible Provider: Ken Ayala MD    Anesthesia Type: general ASA Status: 6          Anesthesia Type: No value filed.     Mathew Phase I:      Mathew Phase II:        Anesthesia Post Evaluation     Patient

## 2023-02-16 NOTE — PROGRESS NOTES
PALLIATIVE CARE PROGRESS NOTE     NAME:  Elena Cisneros RECORD NUMBER:  [id number]  AGE: 32 y.o. GENDER: male  : 1995  TODAY'S DATE:  2023  Room: [facility]3024-01    Reason For Consult:  Goals of care evaluation  Distress management  Symptom Management  Guidance and support  Facilitate communications  Assistance in coordinating care  Recommendations for the above    Plan      Palliative Interaction:  The palliative care service was able to meet with the patient and family today. [place] walk is being organized currently, as the patient is being taken to OR for organ donation today. Discussed with RN and donation agency today. Multiple family members are at bedside. The all appear to be grieving appropriately.  is also at bedside. Condolences and comfort was given to family. IMPRESSION/ PLAN  Symptom management/pain control    We feel the patient's symptoms are being controlled. Their current regimen has been reviewed by myself and discussed with the staff. We recommend adjusting their medications as follows:    Goals of care evaluation  The patient goals of care are family support and preparation for a peaceful death. Long discussion to ensure the patient's and family's understanding of goals of care, and theconcept of palliative care. Code Status:  DNR-CCA    Other Recommendations: none      History of Present Illness     HISTORY OF PRESENT ILLNESS:   The patient is a 32 y.o. male who initially presented to [facility] ED for evaluation of a cough and nasal congestion. His comorbidities include trisomy 24. He was discharged home on amoxicillin with return to ED paperwork. He was noted to be altered and with difficulty breathing and EMS was called. He was cyanotic and eventually lost pulses. CPR was started. ROSC was achieved. He was subsequently transferred to [facility] for further treatment. Downtime was approximately 10 minutes.   He was admitted to the medical ICU for further treatment. Neuro-critical care was consulted on 2/12. Patient was connected to LTME. He has a concerning neurologic examination. Overnight on 2/12 - 2/13, the patient had an acute change in his pupillary response. CT of the head revealed findings compatible with a diffuse anoxic brain injury. LTME noted to be severely abnormal.     Palliative care has been consulted to assist with goals of care and family support. OVERNIGHT EVENTS:  No acute events overnight. Going for organ donation today. Family at bedside prior to honor walk. Vital Signs:  /77   Pulse 96   Temp 97.5 °F (36.4 °C)   Resp 14   Ht 5' 7.01\" (1.702 m)   Wt 220 lb 7.4 oz (100 kg)   SpO2 99%   BMI 34.52 kg/m²     Pertinent Laboratory StudiesReviewed by Me Personally Today:  Lab Results   Component Value Date    WBC 18.7 (H) 02/16/2023    HGB 9.7 (L) 02/16/2023    HCT 27.6 (L) 02/16/2023    MCV 87.9 02/16/2023     02/16/2023     Lab Results   Component Value Date/Time     02/16/2023 06:10 AM    K 3.9 02/16/2023 06:10 AM    CL 97 02/16/2023 06:10 AM    CO2 29 02/16/2023 06:10 AM    BUN 74 02/16/2023 06:10 AM    CREATININE 5.96 02/16/2023 06:10 AM    GLUCOSE 125 02/16/2023 06:10 AM    CALCIUM 7.0 02/16/2023 06:10 AM         has a past medical history of Allergic rhinitis, Anxiety, MR (mental retardation), Respiratory arrest ([facility] and Sleep difficulties. Family History   Problem Relation Age of Onset    Coronary Art Dis Father     High Blood Pressure Father     High Cholesterol Father     Cancer Paternal Grandfather         colon       Social History     Tobacco Use    Smoking status: Never   Substance Use Topics    Alcohol use: No    Drug use: No     REVIEW OF SYSTEMS  Unable to obtain as patient is intubated and unresponsive. PHYSICAL ASSESSMENT:  General Appearance: Intubated, unresponsive.   Mental status: unable to obtain  Head: normocephalic, atraumatic  Eye: no icterus, redness, pupils are not reactive, conjunctiva clear  Ear: normal external ear, no discharge  Nose: no drainage noted  Mouth: mucous membranes moist  Neck: supple, right IJ CVC present  Lungs: Bilateral equal air entry, clear to ausculation, no wheezing, rales or rhonchi, normal effort, mechanical breath sounds  Cardiovascular: Regular rate, regular rhythm, no murmur, gallop, rub  Abdomen: Soft, nontender, nondistended, normal bowel sounds  Neurologic: Not withdrawing to noxious stimuli in either upper or lower extremity bilaterally  Skin: No gross lesions, rashes, bruising or bleeding on exposed skin area  Extremities: peripheral pulses palpable, no pedal edema  Psych: unable to obtain    Palliative Performance Scale:  ___100% Full ambulation; normal activity/No disease; full self-care; normal intake; LOC full  ___90% full ambulation; normal activity/some disease; full self-care; normal intake; LOC full  ___80% ambulation full; normal activity with effort/some disease; full self-care; normal/reduced intake; LOC full  ___70% ambulation reduced; cannot do normal work/some disease; full self-care; normal/reduce intake; LOC full  ___60%  Ambulation reduced; Significant disease; Can't do hobbies/housework; intake normal or reduced; occasional assist; LOC full/confusion  ___50%  Mainly sit/lie; Extensive disease; Can't do any work; Considerable assist; intake normal or reduced; LOC full/confusion  ___40%  Mainly in bed; Extensive disease; Mainly assist; intake normal or reduced; LOC full/confusion   ___30%  Bed Bound; Extensive disease; Total care; intake reduced; LOC full/confusion  ___20%  Bed Bound; Extensive disease; Total care; intake minimal; Drowsy/coma  _x_10%  Bed Bound; Extensive disease;  Total care; Mouth care only; Drowsy/coma  ___0       Death    Principle Problem/Diagnosis:  Principal Problem:    Respiratory arrest ([facility]  Active Problems:    Cardiac arrest due to respiratory disorder (HCC)    Anaphylactic shock    Acute respiratory failure with hypoxia and hypercapnia (HCC)    Lactic acidosis    Encephalopathy acute    SAMUEL (acute kidney injury) ([facility]    Multifocal pneumonia    Aspiration pneumonia of both lungs (HCC)    Bandemia    Arterial hypotension    Pulmonary vascular congestion  Resolved Problems:    * No resolved hospital problems. *    Electronically signed by      Vick Youngblood DO  Hospice/Palliative Care Fellow  [place], [place]  2/16/2023 1:12 PM  Palliative Care Office [phone]  Attending Physician Statement  The patient was evaluated . I have discussed the care of the patient, including pertinent history and exam findings with the palliative care team. The patient was independently seen and examined.  The note above was reviewed and modified and reflects my evaluation  Additional assessment/ plan        Juliana Cannon MD  [address] [place] [address]   [phone]

## 2023-02-16 NOTE — SIGNIFICANT EVENT
Death Pronouncement Note  Patient's Name: Sunshine Marroquin   Patient's YOB: 1995  MRN Number: [id number]          Admitting Provider: Juan Daniel Gonzales MD  Attending Provider: Juan Daniel Gonzales MD     Patient was examined and the following were absent: Pulses, Blood Pressure, and Respiratory effort     I declared the patient dead on 02/16/2023 at 13:41     Preliminary Cause of Death:   Respiratory failure secondary to aspiration pneumonia vs pneumonitis or possible anaphylactic reaction to amoxicillin. Life-sustaining support was terminated at 13:22. Patient was noted to be in asystole at 13:36. 5 minutes auto-resuscitation was observed. Patient was pronounced dead on 2/16/2023 at 13:41.      Electronically signed by Nawaf Monte MD on 2/16/23 at 3:47 PM EST

## 2023-02-16 NOTE — PLAN OF CARE
Problem: Discharge Planning  Goal: Discharge to home or other facility with appropriate resources  2/15/2023 2200 by Nathaniel Burnett RN  Outcome: Not Progressing  2/15/2023 1537 by Maryam Oconnor RN  Outcome: Progressing     Problem: Pain  Goal: Verbalizes/displays adequate comfort level or baseline comfort level  2/15/2023 2200 by Nathaniel Burnett RN  Outcome: Not Progressing  2/15/2023 1537 by Maryam Oconnor RN  Outcome: Progressing     Problem: Confusion  Goal: Confusion, delirium, dementia, or psychosis is improved or at baseline  Description: INTERVENTIONS:  1. Assess for possible contributors to thought disturbance, including medications, impaired vision or hearing, underlying metabolic abnormalities, dehydration, psychiatric diagnoses, and notify attending LIP  2. [place] high risk fall precautions, as indicated  3. Provide frequent short contacts to provide reality reorientation, refocusing and direction  4. Decrease environmental stimuli, including noise as appropriate  5. Monitor and intervene to maintain adequate nutrition, hydration, elimination, sleep and activity  6. If unable to ensure safety without constant attention obtain sitter and review sitter guidelines with assigned personnel  7.  Initiate Psychosocial CNS and Spiritual Care consult, as indicated  2/15/2023 2200 by Nathaniel Burnett RN  Outcome: Not Progressing  2/15/2023 1537 by Maryam Oconnor RN  Outcome: Progressing     Problem: Nutrition Deficit:  Goal: Optimize nutritional status  2/15/2023 2200 by Nathaniel Burnett RN  Outcome: Not Progressing  2/15/2023 1537 by Maryam Oconnor RN  Outcome: Not Progressing     Problem: Discharge Planning  Goal: Discharge to home or other facility with appropriate resources  2/15/2023 2200 by Nathaniel Burnett RN  Outcome: Not Progressing  2/15/2023 1537 by Maryam Oconnor RN  Outcome: Progressing     Problem: Pain  Goal: Verbalizes/displays adequate comfort level or baseline comfort level  2/15/2023 2200 by Geno Yanez RN  Outcome: Not Progressing  2/15/2023 1537 by Isaura Serrano RN  Outcome: Progressing     Problem: Confusion  Goal: Confusion, delirium, dementia, or psychosis is improved or at baseline  Description: INTERVENTIONS:  1. Assess for possible contributors to thought disturbance, including medications, impaired vision or hearing, underlying metabolic abnormalities, dehydration, psychiatric diagnoses, and notify attending LIP  2. [place] high risk fall precautions, as indicated  3. Provide frequent short contacts to provide reality reorientation, refocusing and direction  4. Decrease environmental stimuli, including noise as appropriate  5. Monitor and intervene to maintain adequate nutrition, hydration, elimination, sleep and activity  6. If unable to ensure safety without constant attention obtain sitter and review sitter guidelines with assigned personnel  7.  Initiate Psychosocial CNS and Spiritual Care consult, as indicated  2/15/2023 2200 by Geno Yanez RN  Outcome: Not Progressing  2/15/2023 1537 by Isaura Serrano RN  Outcome: Progressing     Problem: Nutrition Deficit:  Goal: Optimize nutritional status  2/15/2023 2200 by Geno Yanez RN  Outcome: Not Progressing  2/15/2023 1537 by Isaura Serrano RN  Outcome: Not Progressing

## 2023-02-16 NOTE — PROCEDURES
Referring physician: Dr. [facility]  Date:2/16/2023  Start Time:2/15/2023 @ 0730  End Time: 2/16/2023 @ 0730    Indication  Patient with encephalopathy, EEG done to rule out subclinical seizures. Introduction  This continuous video-EEG was acquired using a [employer] workstation at 256 samples/s. Electrodes were placed according to the International 10-20 system. Automated spike and seizure detection algorithms were applied. Video was recorded during this study. Description  The back ground consistent of complete suppression pattern. No consistent focal slowing or interhemispheric asymmetry was noted. Normal sleep structures were observed. There were no interictal epileptiform discharges or electrographic seizures. Events  No events reported or recorded. Impression. Abnormal continuous vEEG recording, the slowing mentioned above suggests severe non specific encephalopathy. No changes from previous study, complete suppression, no reactivity. No epileptiform discharges were identified. Please note the absence of   such activity in this record cannot conclusively rule out an epileptic   disorder. If such is still clinically suspected, a repeat study with   prolonged sampling may be helpful. Pollo Aggarwal MD  Epilepsy Board Certified. Neurology Board Certified.     Electronically Signed

## 2023-02-17 LAB
MICROORGANISM SPEC CULT: NORMAL
MICROORGANISM SPEC CULT: NORMAL
NEURON SPEC ENOLASE: 129.8 NG/ML
NEURON SPEC ENOLASE: 272.1 NG/ML
NEURON SPEC ENOLASE: 71.7 NG/ML
SERVICE CMNT-IMP: NORMAL
SERVICE CMNT-IMP: NORMAL
SPECIMEN DESCRIPTION: NORMAL
SPECIMEN DESCRIPTION: NORMAL

## 2023-02-17 NOTE — ANESTHESIA PRE PROCEDURE
Department of Anesthesiology  Preprocedure Note       Name:  Ry Ratliff   Age:  32 y.o.  :  1995                                          MRN:  [id number]         Date:  2023      Surgeon: Ady Marsh):  [facility]    Procedure: Procedure(s):  ORGAN PROCUREMENT - HEART AND LUNGS    Medications prior to admission:   Prior to Admission medications    Medication Sig Start Date End Date Taking? Authorizing Provider   Cholecalciferol (VITAMIN D3 PO) Take by mouth daily    Historical Provider, MD   atorvastatin (LIPITOR) 20 MG tablet Take 20 mg by mouth daily    Historical Provider, MD   Polyethylene Glycol 3350 (MIRALAX PO) Take by mouth as needed    Historical Provider, MD   cloNIDine (CATAPRES) 0.2 MG tablet Take 1 tablet by mouth 2 times daily  Patient taking differently: Take 0.2 mg by mouth daily 5/8/15   Baldomero Spangler MD   loratadine (CLARITIN) 10 MG tablet Take 1 tablet by mouth daily 5/8/15   Baldomero Spangler MD   fluticasone (FLONASE) 50 MCG/ACT nasal spray 1 spray by Nasal route daily 5/8/15   Baldomero Spangler MD   erythromycin with ethanol (EMGEL) 2 % gel Apply topically daily. 14   Baldomero Spangler MD       Current medications:    Current Outpatient Medications   Medication Sig Dispense Refill    Cholecalciferol (VITAMIN D3 PO) Take by mouth daily      atorvastatin (LIPITOR) 20 MG tablet Take 20 mg by mouth daily      Polyethylene Glycol 3350 (MIRALAX PO) Take by mouth as needed      cloNIDine (CATAPRES) 0.2 MG tablet Take 1 tablet by mouth 2 times daily (Patient taking differently: Take 0.2 mg by mouth daily) 180 tablet 3    loratadine (CLARITIN) 10 MG tablet Take 1 tablet by mouth daily 90 tablet 3    fluticasone (FLONASE) 50 MCG/ACT nasal spray 1 spray by Nasal route daily 1 Bottle 3    erythromycin with ethanol (EMGEL) 2 % gel Apply topically daily. 1 Tube 3     No current facility-administered medications for this visit. Allergies:     Allergies   Allergen Reactions    Amoxicillin Anaphylaxis    Bactrim [Sulfamethoxazole-Trimethoprim]     Clindamycin/Lincomycin        Problem List:    Patient Active Problem List   Diagnosis Code    Social anxiety disorder of childhood F40.10    Development disorder, mixed F88    Obesity E66.9    Anaphylactic shock T78. 2XXA    Respiratory arrest ([facility] R09.2    Cardiac arrest due to respiratory disorder (HCC) J98.9, I46.8    Acute respiratory failure with hypoxia and hypercapnia (HCC) J96.01, J96.02    Lactic acidosis E87.20    Encephalopathy acute G93.40    SAMUEL (acute kidney injury) ([facility] N17.9    Multifocal pneumonia J18.9    Aspiration pneumonia of both lungs ([facility]) J69.0    Bandemia D72.825    Arterial hypotension I95.9    Pulmonary vascular congestion R09.89       Past Medical History:        Diagnosis Date    Allergic rhinitis     Anxiety     MR (mental retardation)     Respiratory arrest ([facility] [address] 2/11/2023    Sleep difficulties        Past Surgical History:  No past surgical history on file. Social History:    Social History     Tobacco Use    Smoking status: Never    Smokeless tobacco: Not on file   Substance Use Topics    Alcohol use: No                                Counseling given: Not Answered      Vital Signs (Current): There were no vitals filed for this visit.                                            BP Readings from Last 3 Encounters:   02/16/23 119/77   02/11/23 126/68   02/11/23 110/69       NPO Status:                                                                                 BMI:   Wt Readings from Last 3 Encounters:   02/16/23 220 lb 7.4 oz (100 kg)   02/11/23 207 lb (93.9 kg)   07/09/17 221 lb (100.2 kg)     There is no height or weight on file to calculate BMI.    CBC:   Lab Results   Component Value Date/Time    WBC 18.7 02/16/2023 06:10 AM    RBC 3.14 02/16/2023 06:10 AM    HGB 9.7 02/16/2023 06:10 AM    HCT 27.6 02/16/2023 06:10 AM    MCV 87.9 02/16/2023 06:10 AM    RDW 14.7 02/16/2023 06:10 AM     02/16/2023 06:10 AM       CMP:   Lab Results   Component Value Date/Time     02/16/2023 06:10 AM    K 3.9 02/16/2023 06:10 AM    CL 97 02/16/2023 06:10 AM    CO2 29 02/16/2023 06:10 AM    BUN 74 02/16/2023 06:10 AM    CREATININE 5.96 02/16/2023 06:10 AM    GFRAA >60 03/22/2022 11:46 AM    LABGLOM 12 02/16/2023 06:10 AM    GLUCOSE 125 02/16/2023 06:10 AM    PROT 4.6 02/16/2023 06:10 AM    CALCIUM 7.0 02/16/2023 06:10 AM    BILITOT 3.4 02/16/2023 06:10 AM    ALKPHOS 187 02/16/2023 06:10 AM     02/16/2023 06:10 AM    ALT 1,452 02/16/2023 06:10 AM       POC Tests:   Recent Labs     02/16/23  0416   POCGLU 110*       Coags:   Lab Results   Component Value Date/Time    PROTIME 15.8 02/16/2023 06:10 AM    INR 1.5 02/16/2023 06:10 AM    APTT 25.5 02/16/2023 06:10 AM       HCG (If Applicable): No results found for: PREGTESTUR, PREGSERUM, HCG, HCGQUANT     ABGs: No results found for: PHART, PO2ART, CYT3NYA, ZGA5XBZ, BEART, Z4VGBIUW     Type & Screen (If Applicable):  No results found for: LABABO, LABRH    Drug/Infectious Status (If Applicable):  Lab Results   Component Value Date/Time    HEPCAB NONREACTIVE 02/15/2023 01:12 PM       COVID-19 Screening (If Applicable):   Lab Results   Component Value Date/Time    COVID19 Not Detected 02/16/2023 12:30 AM    COVID19 Not Detected 02/12/2023 10:51 AM           Anesthesia Evaluation    Airway: Mallampati: Unable to assess / NA         Intubated Dental:          Pulmonary:                              Cardiovascular:                      Neuro/Psych:               GI/Hepatic/Renal:             Endo/Other:                     Abdominal:             Vascular:           Other Findings:           Venkata Guerin MD   2/16/2023

## (undated) DEVICE — DRAPE,UTILTY,TAPE,15X26, 4EA/PK: Brand: MEDLINE

## (undated) DEVICE — DRAPE,LAP,CHOLE,W/TROUGHS,STERILE: Brand: MEDLINE

## (undated) DEVICE — NEPTUNE E-SEP SMOKE EVACUATION PENCIL, COATED, 70MM BLADE, PUSH BUTTON SWITCH: Brand: NEPTUNE E-SEP

## (undated) DEVICE — DRAPE,REIN 53X77,STERILE: Brand: MEDLINE

## (undated) DEVICE — 3M™ STERI-DRAPE™ ISOLATION BAG, 10 PER CARTON / 4 CARTONS PER CASE, 1003: Brand: 3M™ STERI-DRAPE™

## (undated) DEVICE — PACK SURG ABD SVMMC

## (undated) DEVICE — INTENDED FOR TISSUE SEPARATION, AND OTHER PROCEDURES THAT REQUIRE A SHARP SURGICAL BLADE TO PUNCTURE OR CUT.: Brand: BARD-PARKER ® CARBON RIB-BACK BLADES

## (undated) DEVICE — YANKAUER,BULB TIP,W/O VENT,RIGID,STERILE: Brand: MEDLINE

## (undated) DEVICE — MAT ABSRB W28XL48IN C6L FLR ULT W POLY BK

## (undated) DEVICE — YANKAUER,POOLE TIP,STERILE,50/CS: Brand: MEDLINE

## (undated) DEVICE — LOOP VES W25MM THK1MM MAXI RED SIL FLD REPELLENT 100 PER

## (undated) DEVICE — TUBING, SUCTION, 9/32" X 20', STRAIGHT: Brand: MEDLINE INDUSTRIES, INC.

## (undated) DEVICE — 3M™ IOBAN™ 2 ANTIMICROBIAL INCISE DRAPE 6650EZ: Brand: IOBAN™ 2

## (undated) DEVICE — YANKAUER,FLEXIBLE HANDLE,REGLR CAPACITY: Brand: MEDLINE INDUSTRIES, INC.

## (undated) DEVICE — PLUG,CATHETER,DRAINAGE PROTECTOR,TUBE: Brand: MEDLINE

## (undated) DEVICE — NEPTUNE E-SEP 165MM SUCTION SLEEVE: Brand: NEPTUNE E-SEP

## (undated) DEVICE — PROVIDES A STERILE INTERFACE BETWEEN THE OPERATING ROOM SURGICAL LAMPS (NON-STERILE) AND THE SURGEON OR NURSE (STERILE).: Brand: STERION®CLAMP COVER FABRIC

## (undated) DEVICE — SYRINGE CATH TIP 50ML

## (undated) DEVICE — SPONGE LAP W18XL18IN WHT COT 4 PLY FLD STRUNG RADPQ DISP ST

## (undated) DEVICE — CONTAINER,SPECIMEN,4OZ,OR STRL: Brand: MEDLINE

## (undated) DEVICE — COVER OR TBL W40XL90IN ABSRB STD AND GRIPPY BK SAHARA

## (undated) DEVICE — DRAPE SLUSH DISC W44XL66IN ST FOR RND BSIN HUSH SLUSH SYS

## (undated) DEVICE — SUTURE NONABSORBABLE  MERSILINE TP1 SIZE 5